# Patient Record
Sex: FEMALE | Race: WHITE | NOT HISPANIC OR LATINO | Employment: OTHER | URBAN - METROPOLITAN AREA
[De-identification: names, ages, dates, MRNs, and addresses within clinical notes are randomized per-mention and may not be internally consistent; named-entity substitution may affect disease eponyms.]

---

## 2018-11-20 ENCOUNTER — AMBULATORY - HEALTHEAST (OUTPATIENT)
Dept: VASCULAR SURGERY | Facility: CLINIC | Age: 73
End: 2018-11-20

## 2018-11-20 DIAGNOSIS — I87.8 VENOUS STASIS OF BOTH LOWER EXTREMITIES: ICD-10-CM

## 2018-12-12 ENCOUNTER — COMMUNICATION - HEALTHEAST (OUTPATIENT)
Dept: VASCULAR SURGERY | Facility: CLINIC | Age: 73
End: 2018-12-12

## 2019-01-22 ENCOUNTER — RECORDS - HEALTHEAST (OUTPATIENT)
Dept: VASCULAR ULTRASOUND | Facility: CLINIC | Age: 74
End: 2019-01-22

## 2019-01-22 ENCOUNTER — OFFICE VISIT - HEALTHEAST (OUTPATIENT)
Dept: VASCULAR SURGERY | Facility: CLINIC | Age: 74
End: 2019-01-22

## 2019-01-22 DIAGNOSIS — I87.2 VENOUS INSUFFICIENCY (CHRONIC) (PERIPHERAL): ICD-10-CM

## 2019-01-22 DIAGNOSIS — I83.893 SYMPTOMATIC VARICOSE VEINS OF BOTH LOWER EXTREMITIES: ICD-10-CM

## 2019-01-22 DIAGNOSIS — I87.2 VENOUS INSUFFICIENCY OF BOTH LOWER EXTREMITIES: ICD-10-CM

## 2019-01-22 DIAGNOSIS — I83.893 VARICOSE VEINS OF BILATERAL LOWER EXTREMITIES WITH OTHER COMPLICATIONS: ICD-10-CM

## 2019-01-22 ASSESSMENT — MIFFLIN-ST. JEOR: SCORE: 1273.79

## 2019-11-19 ENCOUNTER — HOSPITAL ENCOUNTER (OUTPATIENT)
Dept: MAMMOGRAPHY | Facility: CLINIC | Age: 74
Discharge: HOME | End: 2019-11-19

## 2019-11-19 DIAGNOSIS — Z12.31 VISIT FOR SCREENING MAMMOGRAM: ICD-10-CM

## 2020-09-17 ENCOUNTER — RECORDS - HEALTHEAST (OUTPATIENT)
Dept: ADMINISTRATIVE | Facility: OTHER | Age: 75
End: 2020-09-17

## 2020-10-19 ENCOUNTER — HOSPITAL ENCOUNTER (OUTPATIENT)
Dept: RADIOLOGY | Facility: HOSPITAL | Age: 75
Discharge: HOME | End: 2020-10-19
Attending: INTERNAL MEDICINE

## 2020-10-19 DIAGNOSIS — R13.10 DYSPHAGIA, UNSPECIFIED TYPE: ICD-10-CM

## 2021-05-28 ENCOUNTER — RECORDS - HEALTHEAST (OUTPATIENT)
Dept: ADMINISTRATIVE | Facility: CLINIC | Age: 76
End: 2021-05-28

## 2021-05-29 ENCOUNTER — RECORDS - HEALTHEAST (OUTPATIENT)
Dept: ADMINISTRATIVE | Facility: CLINIC | Age: 76
End: 2021-05-29

## 2021-05-30 ENCOUNTER — RECORDS - HEALTHEAST (OUTPATIENT)
Dept: ADMINISTRATIVE | Facility: CLINIC | Age: 76
End: 2021-05-30

## 2021-05-31 ENCOUNTER — RECORDS - HEALTHEAST (OUTPATIENT)
Dept: ADMINISTRATIVE | Facility: CLINIC | Age: 76
End: 2021-05-31

## 2021-06-02 ENCOUNTER — RECORDS - HEALTHEAST (OUTPATIENT)
Dept: ADMINISTRATIVE | Facility: CLINIC | Age: 76
End: 2021-06-02

## 2021-06-02 VITALS — WEIGHT: 175 LBS | HEIGHT: 64 IN | BODY MASS INDEX: 29.88 KG/M2

## 2021-06-12 NOTE — PROGRESS NOTES
"Speech Language/Pathology  Videofluoroscopic Swallow Study     Problem:  There is no problem list on file for this patient.      Onset Date: 9/17/2020 (order date)  Reason for Evaluation: Assess for oropharyngeal dysphagia  Pertinent History: Patient reports that food, particularly meats and vegetables, sometimes \"stick\" in her throat. She also has difficulty getting pills down. She tries to get capsules whenever possible, as these seem to go down easier. If taking tablets, she often has to split these in half and grind down the jagged edges before attempting to swallow them. Patient also endorses \"sporatic\" coughing with oral intake.   Current Diet: Regular textures and thin liquids     Assessment:     Patient demonstrated no significant oral dysphagia and no pharyngeal dysphagia. Patient's swallow function is WNL for her age group.     Patient is at minimal aspiration risk with all intake.     Recommendations:     Plan: Continue current diet of Regular textures and thin liquids     Strategies: Patient to sit fully upright for all intake and remain upright for 30 minutes thereafter, eat slowly, and take small bites & sips. Patient to also avoid environmental distractions (e.g., watching TV, using phone/tablet device) and refrain from talking while eating/drinking due to increased aspiration risk.     Speech Therapy is not recommended at this time.     Referrals: Patient to continue to follow with [facility] for ongoing symptom management.     Reviewed history of swallow problem with patient and verbally explained roles of SLP and radiologist. Verbally explained process of VFSS prior to administration of barium. Verbally explained results and recommendations to patient. SLP answered questions and stated that a copy of this report will be faxed to patient's referring provider, Dr. Roche of [facility]. Patient verbalized understanding.     Subjective:     Patient presented as pleasant and cooperative during this evaluation. " She arrived unaccompanied to this appointment. Patient also participated in an esophagram during this visit. Please refer to Radiology's separately dictated report for details.    An  was not applicable     Patient was given thin and pureed consistencies of barium.     Objective:     Dentition/Oral hygiene: Patient's dentition is intact. She has had extensive dental work. Oral hygiene was good.     Oral motor function was not impaired.      Bolus prep and oral control were not impaired.      Anterior-Posterior transit was not impaired.     Premature spill beyond the base of the tongue did not occur with either consistency.     Tongue base retraction was not impaired.     Oral stasis did not occur with either consistency.     Aspiration did not occur with either consistency.      Laryngeal penetration did not occur with either consistency.     Swallow response was timely with both consistencies.      Epiglottic movement was complete consistently across texture trials.     Pharyngeal stasis did not occur with either consistency.     Pharyngeal constriction was not impaired.     Hyolaryngeal elevation was not impaired. Hyolaryngeal excursion was not impaired.     Cricopharyngeal function was not impaired. Cervical esophageal function was not impaired.    17 dysphagia minutes    Doris KSENIA Ward, CCC-SLP

## 2021-06-17 NOTE — PATIENT INSTRUCTIONS - HE
"Patient Instructions by Deloris Mendez LPN at 1/22/2019 10:40 AM     Author: Deloris Mendez LPN Service: -- Author Type: Licensed Nurse    Filed: 1/22/2019 11:04 AM Encounter Date: 1/22/2019 Status: Addendum    : Deloris Mendez LPN (Licensed Nurse)    Related Notes: Original Note by Deloris Mendez LPN (Licensed Nurse) filed at 1/22/2019 10:47 AM       We would like you to purchase a pair of the [employer] brand stockings from [employer]. You can order them online or by calling the toll free number.  Your leg measurements are provided below and you will have to let the [employer] representative know these measurments to get the correct size for you .  Website: [email or web address]  Toll-free: [phone]  Hours: Mon-Thur 8:30am-8pm, Friday 8:30am-7pm, Sat 9am-4pm    Swelling in the legs can be caused by many reasons. No matter what the reason, treatment usually includes some type of compression. You should wear your compression socks as much as you can. Your compression should be put on first thing in the morning. Take the compression off at night. It is especially important to wear them with long periods of sitting/standing, long car rides or if you will be flying. Going without compression for even brief periods of time can be damaging to your legs and your health.  Compression socks should get replaced every 4-6 months. They do not need to be worn at night while in bed. Call us with any problems or questions. If you do a lot of standing, it is good to do calf raises to help keep the blood pumping. If you sit a lot at work, it is good to get up periodically to walk around. Elevation of the foot of your bed 4-6\" helps the blood return back to where it is needed.     Please call us if you have any questions [phone]    Thank you for choosing [email or web address].  We are prescribing some compression stockings for you. You should wear these socks as much as you can. It is especially important " "to wear them with long periods of sitting/standing, long car rides or if you will be flying. Compression socks should get replaced every 4-6 months. They do not need to be worn at night while in bed.    If you do a lot of standing it is good to do calf raises to help keep the blood pumping. If you sit a lot at work it is good to get up periodically to walk around. Elevation of the foot of your bed 4-6\" helps the blood return back to where it is needed.    Understanding Spider and Varicose Veins      Do you often hide your legs because of the way they look? You may have noticed tiny red or blue bursts (spider veins). Or maybe you have veins that bulge or look twisted (varicose veins). If so, there are treatments that can help.    What Are the Symptoms?  Spider veins or varicose veins may never be a problem. But sometimes they can cause legs to ache or swell. Your legs may also feel heavy and tired, or like theyre burning. These symptoms may be more severe at the end of the day. Prolonged sitting or standing can also make your symptoms worse.        Who Gets Spider and Varicose Veins?  Anyone can get spider or varicose veins. But vein problems tend to be hereditary (run in families). Other factors that can affect veins include:    Pregnancy, hormones, and birth control pills    A job where you stand or sit a lot    Extra weight or lack of exercise    Age    What Can Be Done?  Spider and varicose veins can affect the way you feel about yourself. Talk to your doctor about your concerns. There are treatments that can ease symptoms and make your legs look better.  Your Treatment Options  Treatment may include self-care, sclerotherapy (injecting veins with a chemical), or surgery. Spider veins and some varicose veins can be treated with sclerotherapy. Large varicose veins may need surgery.    2068-6514 [employer]. [address] [place] [address], [place] [address]. All rights reserved. This information is not " intended as a substitute for professional medical care. Always follow your healthcare professional's instructions.      Ultrasound    Thank you for choosing [facility] as partners in your care.  Please read the following information before your appointment.  Feel free to ask questions.    An ultrasound is an exam that uses sound waves to create pictures.  The special camera that takes the pictures is called a transducer.  An ultrasound technologist will perform the exam under the direction of a physician.    Preparation  Ultrasound preps vary.  If you are scheduled for an Aorta Ultrasound, please do not eat or drink anything 8 hours before your exam.  You may take daily medications with a small sip of water.  There is no preparation required for any of the other ultrasound exams performed at [facility].    The Examination  You may or may not need to change clothes for your exam.  The technologist will explain the exam to you.  He or she will ask you a few questions and answer any questions you may have.    You will lie on a table and a gel will be applied to your skin.  A small handheld instrument called a transducer will be moved across the area we are looking at while pictures are taken.  Also, your exam may include measuring your blood pressure on your arms, legs and/or toes.    When the Exam Is Completed  Your physician will receive the results of the exam and explain them to you.  You may return to your normal diet and activities unless otherwise directed by your doctor.  Feel free to ask questions if something is not clear to you.  We welcome comments.    At [facility], we are dedicated to providing the best possible care.  Thank you for taking time to read these instructions.  We hope your experience is as pleasant as possible.      You are scheduled for the following exam(s):    [x]Venous Duplex:  Evaluates the veins that carry blood to the heart from the arms and/or legs.   Gel is applied to the skin of the arms and/or legs.  A transducer will be placed on the gel covered areas to obtain images and evaluate flow in the veins.  This exam takes approximately 30 minutes per arm/leg.    Self-Care for Spider and Varicose Veins    Your doctor may suggest that you try self-care. Exercising and maintaining a healthy weight may keep problem veins from getting worse. Wearing elastic stockings and elevating your legs can help improve blood flow. Taking breaks when you sit or stand helps, too.  Exercising        Exercising is good for your veins because it improves blood flow. Walking, cycling, or swimming are great exercises for vein health. But be sure to check with your doctor before starting any exercise program. Also, keep these hints in mind:    When exercising, start out slowly and try to build up to 30 minutes on most days.    Elevate your legs above heart level after exercise to keep blood from pooling in veins.  Maintaining a healthy weight  Being overweight puts extra pressure on your veins. To maintain a healthy weight, try these tips:    Choose lean meats, fish, and skinless chicken.    Use low-fat dairy products.    Eat foods high in fiber, such as whole grains, fruits, and vegetables.    Cut down on sugar, salt, and fats such as butter.    Exercise regularly.  Wearing elastic stockings  Elastic stockings gently squeeze veins so blood flows upward. If you need elastic stockings, your doctor can prescribe them for you. Follow your doctors advice about how and when to wear them. Elastic stockings should fit snugly.  Elevating your legs  Raising your legs above heart level will help relieve swelling and keep blood from pooling in veins. Try to elevate your legs for 15 to 20 minutes at the end of the day, and whenever youre relaxing. To make sure your legs are raised above heart level, prop them up on cushions or large pillows.  When sitting and standing  To keep blood moving when you  have to sit or stand for long periods, try these tips:    At work, take walking breaks instead of coffee breaks. Walk during your lunch hour. Or try flexing your feet up and down 10 times each hour.    When standing, raise yourself up and down on your toes, or rock back and forth on your heels.    6724-8833 [employer]. [address] [place] [address], [place] [address]. All rights reserved. This information is not intended as a substitute for professional medical care. Always follow your healthcare professional's instructions.

## 2021-06-23 NOTE — PROGRESS NOTES
This is a new consult for Varicose Veins. Previously seen 9/2016, and had RFA of left gsv done in 2006. The patient has varicose veins that are problematic in right legs. Symptoms patient has been experiencing are pain  and  swelling in right leg, rated 5 out of 10. Patient  has been wearing compression stockings. Patient has not had recent imaging on legs done, last done 2016.

## 2021-06-27 ENCOUNTER — HEALTH MAINTENANCE LETTER (OUTPATIENT)
Age: 76
End: 2021-06-27

## 2021-06-27 NOTE — PROGRESS NOTES
Progress Notes by Moy Gottlieb MD at 1/22/2019 10:40 AM     Author: Moy Gottlieb MD Service: -- Author Type: Physician    Filed: 1/23/2019  4:00 PM Encounter Date: 1/22/2019 Status: Signed    : Moy Gottlieb MD (Physician)       [facility] Surgery Follow up    HPI:    73 y.o. year old female who returns for a follow up.  She has had a closure procedure back in 2006 and her last follow-up with us was in 2016.  She is on her feet quite a bit.  Tries to exercise on a regular basis and wears her socks often.     Allergies:Amoxicillin; Lisinopril; and Latex    Past Medical History:   Diagnosis Date   ? Fibrocystic breast    ? Hypothyroidism    ? Osteopenia    ? Venous stasis        Past Surgical History:   Procedure Laterality Date   ? CHOLECYSTECTOMY     ? EYE SURGERY     ? VARICOSE VEIN SURGERY Left 2006    rfa       CURRENT MEDS:  Current Outpatient Medications on File Prior to Visit   Medication Sig Dispense Refill   ? acetaZOLAMIDE (DIAMOX) 250 MG tablet      ? aspirin 81 MG EC tablet Take 81 mg by mouth daily.     ? FOLIC ACID/MULTIVIT-MIN/LUTEIN (CENTRUM SILVER ORAL) Take by mouth.     ? losartan (COZAAR) 100 MG tablet      ? naproxen sodium (ALEVE) 220 MG tablet Take 220 mg by mouth every 12 (twelve) hours as needed for pain.     ? omeprazole (PRILOSEC) 20 MG capsule      ? ranitidine (ZANTAC) 300 MG tablet      ? simvastatin (ZOCOR) 40 MG tablet      ? SYNTHROID 112 mcg tablet      ? VANIQA 13.9 % cream        No current facility-administered medications on file prior to visit.        Family History   Problem Relation Age of Onset   ? No Medical Problems Mother    ? No Medical Problems Father    ? No Medical Problems Sister    ? No Medical Problems Daughter    ? No Medical Problems Maternal Grandmother    ? No Medical Problems Maternal Grandfather    ? No Medical Problems Paternal Grandmother    ? No Medical Problems Paternal Grandfather    ? No Medical Problems Maternal Aunt    ? No  "Medical Problems Paternal Aunt    ? BRCA 1/2 Neg Hx    ? Breast cancer Neg Hx    ? Cancer Neg Hx    ? Colon cancer Neg Hx    ? Endometrial cancer Neg Hx    ? Ovarian cancer Neg Hx         reports that  has never smoked. she has never used smokeless tobacco. She reports that she drinks alcohol. She reports that she does not use drugs.    Review of Systems:  Negative except status post closure in the past she is occasionally of some pain symptoms and is no severe the branches of dilated veins at this time point.  These are bilaterally.  She is wearing compression off and on for the last few years. Otherwise twelve system of review is negative.      OBJECTIVE:  Vitals:    01/22/19 1058   BP: 104/76   Pulse: 60   Resp: 16   Temp: 97.7  F (36.5  C)   TempSrc: Oral   Weight: 175 lb (79.4 kg)   Height: 5' 4\" (1.626 m)     Body mass index is 30.04 kg/m .    EXAM:  GENERAL: This is a well-developed 73 y.o. female who appears her stated age  HEAD: normocephalic  HEENT: Pupils equal and reactive bilaterally  CARDIAC: RRR without murmur  CHEST/LUNG:  Clear to auscultation  ABDOMEN: Soft, nontender, nondistended, no masses    NEUROLOGIC: Focally intact, nonfocal  VASCULAR: Pulses intact, symmetrical upper and lower extremities.                LABS:  No results found for: WBC, HGB, HCT, MCV, PLT  INR/Prothrombin Time      No results found for: HGBA1C  No results found for: ALT, AST, GGT, ALKPHOS, BILITOT     Images:     US Venous Insufficiency Legs Bilateral (Order [id number])   Imaging   Date: 1/22/2019 Department: [facility] Ultrasound [place] Released By: Wilder Rey, RADHA, RVT Authorizing: Moy Gottlieb MD   Study Result     [facility] RADIOLOGY  LOCATION: [facility]  DATE: 1/22/2019     EXAM: BILATERAL LOWER EXTREMITY DEEP AND SUPERFICIAL VENOUS DUPLEX ULTRASOUND WITH PHYSIOLOGIC TESTING     INDICATION: Symptomatic varicose veins. Assess for incompetent veins.     TECHNIQUE: " Supine and upright ultrasound of the deep and superficial veins with Valsalva and compression augmentation maneuvers. Duplex imaging is performed utilizing gray-scale, two-dimensional images, color-flow imaging, Doppler waveform analysis, and   spectral Doppler imaging.      INCOMPETENCY CRITERIA: Deep vein reflux reported when greater than 1,000 ms flow reversal.  Superficial vein reflux reported when greater than 600 ms flow reversal.  vein reflux reported as greater than 350 ms flow reversal.     DEEP VEIN FINDINGS:     RIGHT LEG: The common femoral, profunda femoral, femoral, popliteal, and visualized calf veins are patent and compressible.  Common femoral vein is incompetent.     LEFT LEG:  The common femoral, profunda femoral, femoral, popliteal, and visualized calf veins are patent and compressible.   Common femoral veins competent.     RIGHT SUPERFICIAL VEIN FINDINGS:  GREAT SAPHENOUS VEIN: Competent from the saphenofemoral junction to the mid calf.     SMALL SAPHENOUS VEIN: Competent from the saphenopopliteal junction to the mid calf.     No incompetent perforating veins or abnormal accessory veins identified.     LEFT SUPERFICIAL VEIN FINDINGS:  GREAT SAPHENOUS VEIN: Incompetent in the proximal thigh where it measures 4 mm in diameter in the mid calf where it measures 4 mm in diameter. Occlusion is seen from the mid thigh to the knee.     SMALL SAPHENOUS VEIN: Competent from the saphenopopliteal junction to the mid calf.     The left anterior accessory saphenous vein appears incompetent measuring 4 mm at the saphenofemoral junction and 3 mm in the proximal thigh.     IMPRESSION:   CONCLUSION:   1.  No deep venous thrombosis of either lower extremity. Both the right left common femoral veins are incompetent.  2.  RIGHT LEG: The right superficial venous system is patent, without incompetence.  3.  LEFT LEG: The left greater saphenous vein demonstrates occlusion at the mid thigh to the knee but is  otherwise patent. Incompetence of the greater saphenous vein is noted in the proximal thigh and mid calf. The left anterior accessory saphenous vein   also appears incompetent from the saphenofemoral junction to the proximal thigh.         Assessment/Plan:   1. Venous insufficiency of both lower extremities  Status post closure in 2006.  I started last in 2016.  We repeated her ultrasound today which does not show any major issues she does have an ASV which is too tortuous to close in the left side.  After discussion when she can get her back in some stockings again have him wear those regular basis,  weight loss and weight control.  We discussed that there are surgical options at this time point she is just interested in continuing the compression conservative treatments.  - US Venous Insufficiency Legs Bilateral; Future  - Compression stockings    2. Symptomatic varicose veins of both lower extremities  Same  - US Venous Insufficiency Legs Bilateral; Future  - Compression stockings      No Follow-up on file.     Moy Gottlieb MD  [facility] Department of Surgery

## 2021-07-13 ENCOUNTER — RECORDS - HEALTHEAST (OUTPATIENT)
Dept: ADMINISTRATIVE | Facility: CLINIC | Age: 76
End: 2021-07-13

## 2021-07-21 ENCOUNTER — RECORDS - HEALTHEAST (OUTPATIENT)
Dept: ADMINISTRATIVE | Facility: CLINIC | Age: 76
End: 2021-07-21

## 2021-07-22 ENCOUNTER — RECORDS - HEALTHEAST (OUTPATIENT)
Dept: SCHEDULING | Facility: CLINIC | Age: 76
End: 2021-07-22

## 2021-07-22 DIAGNOSIS — Z12.31 OTHER SCREENING MAMMOGRAM: ICD-10-CM

## 2021-10-16 ENCOUNTER — HEALTH MAINTENANCE LETTER (OUTPATIENT)
Age: 76
End: 2021-10-16

## 2022-06-21 ENCOUNTER — TRANSFERRED RECORDS (OUTPATIENT)
Dept: HEALTH INFORMATION MANAGEMENT | Facility: CLINIC | Age: 77
End: 2022-06-21

## 2022-07-23 ENCOUNTER — HEALTH MAINTENANCE LETTER (OUTPATIENT)
Age: 77
End: 2022-07-23

## 2022-09-12 ENCOUNTER — TRANSFERRED RECORDS (OUTPATIENT)
Dept: HEALTH INFORMATION MANAGEMENT | Facility: CLINIC | Age: 77
End: 2022-09-12

## 2022-10-01 ENCOUNTER — HEALTH MAINTENANCE LETTER (OUTPATIENT)
Age: 77
End: 2022-10-01

## 2022-10-17 ENCOUNTER — TRANSFERRED RECORDS (OUTPATIENT)
Dept: HEALTH INFORMATION MANAGEMENT | Facility: CLINIC | Age: 77
End: 2022-10-17

## 2022-10-24 ENCOUNTER — TRANSFERRED RECORDS (OUTPATIENT)
Dept: HEALTH INFORMATION MANAGEMENT | Facility: CLINIC | Age: 77
End: 2022-10-24

## 2022-11-01 ENCOUNTER — TRANSFERRED RECORDS (OUTPATIENT)
Dept: HEALTH INFORMATION MANAGEMENT | Facility: CLINIC | Age: 77
End: 2022-11-01

## 2022-11-08 ENCOUNTER — TRANSFERRED RECORDS (OUTPATIENT)
Dept: HEALTH INFORMATION MANAGEMENT | Facility: CLINIC | Age: 77
End: 2022-11-08

## 2023-04-24 NOTE — PATIENT INSTRUCTIONS
Irene     Thank you for entrusting your care with us at the [facility].     We are prescribing some compression stockings for you. I have included different suppliers that should help you get measured and fitting to ensure proper fitting socks. You should wear these stockings as much as you can. It is especially important to wear them with long periods of standing, sitting, long car rides or if you will be flying. Compression socks should get refilled every 4-6 months. They do not need to be worn at night while in bed. It is recommended to wear compression level of 20-30mmhg or higher from toes to knees.     We will perform an ultrasound today or prior to your appointment with us in 3 months time to evaluate the valves in your veins.     We would like you to follow up in 3 months after wearing socks to see how you are doing.    Varicose Veins      Varicose veins are swollen, enlarged veins most often found in the legs. They are usually blue or purple in color and may bulge, twist, and stand out under the skin.  Normally, veins return blood from the body to the heart. The leg veins have one-way valves that prevent blood from flowing backward in the vein. When the valves are weak or damaged, blood backs up in the veins. This may cause some of the veins to swell and bulge and become varicose veins.      Symptoms    Varicose veins may or may not cause symptoms. If symptoms do occur, they can include:  Legs that feel tired, achy, heavy, or itchy  Leg muscle cramps  Skin changes, such as discoloration, dryness, redness, or rash (in more severe cases, you may also have sores on the skin called venous leg ulcers)    Pain & Discomfort  Pain, itching, swelling, burning, leg heaviness or tiredness, skin discoloration. Symptoms typically worsen throughout the day and are partially relieved by elevation or wearing compression socks or stockings.    Sometimes, varicose veins clot and become  painful, hot, hard and discolored. This is called phlebitis, an uncomfortable but temporary condition that will get better on its own in 2-3 months. Clots associated with phlebitis are limited to surface veins, and not dangerous - unlike clots in the deep veins (deep vein thrombosis or DVT) that are dangerous because they can travel to the heart or lung and require prompt treatment with blood thinners.    Bleeding  A shower or minor trauma can cause a varicose vein to burst and bleed.    Risk Factors    There are a number of factors that increase the risk for varicose veins. These can include:  Being a woman  Being older  Sitting or standing for long periods  Being overweight  Being pregnant  Having a family history of varicose veins    Among other things, veins are responsible for bringing blood back to the heart, sometimes working against gravity. When you walk, muscles in your leg squeeze the veins and help blood flow back into the heart. In normal veins, a series of valves assist this process. With varicose veins and with a related condition called chronic venous insufficiency, poorly functioning valves allow the blood to pool in the lower leg and cause symptoms.     Treatment begins with simple self-help measures (see below). If these don t help, there are many procedures that can be done to shrink or remove varicose veins. Your healthcare provider can tell you more about these options, if needed.    Home care  Support or compression stockings will likely be prescribed. If so, be sure to wear them as directed. They may help improve blood flow.  Exercising helps strengthen your leg muscles and improve blood flow. To get the most benefit, choose exercises such as walking, swimming, or cycling. Also try to exercise for at least 30 minutes on most days.  Raising (elevating) your legs lets gravity help blood flow back to the heart. Sit or lie with your feet above heart level a few times throughout the day, or as  directed.  Avoid long periods of sitting or standing. Change positions often. Also, move your ankles, toes and knees often. This may also help improve blood flow.  If you are overweight, talk with your healthcare provider about setting up a weight-loss plan. Maintaining a healthy weight can help reduce the strain on your veins. It may also improve symptoms, such as swelling and aching.  If you have dryness and itching, ask your provider about special lotions that can be applied to the skin to help improve symptoms.    When to seek medical advice  Call your healthcare provider right away if any of these occur:  Sudden, severe leg swelling, pain, or redness  Symptoms worsen, or they don t improve with self-care  Bleeding from any affected veins  Ulcers form on the legs, ankles, or feet  Fever of 100.4 F (38 C) or higher, or as advised by your provider      Understanding Spider and Varicose Veins    Do you often hide your legs because of the way they look? You may have noticed tiny red or blue bursts (spider veins). Or maybe you have veins that bulge or look twisted (varicose veins). If so, there are treatments that can help    What are the symptoms?  Spider veins or varicose veins may never be a problem. But sometimes they can cause legs to ache or swell. Your legs may also feel heavy and tired, or like they re burning. These symptoms may be more severe at the end of the day. Prolonged sitting or standing can also make your symptoms worse.    Who gets spider and varicose veins?  Anyone can get spider or varicose veins. But vein problems tend to be hereditary (run in families). Other factors that can affect veins include:  Pregnancy, hormones, and birth control pills  A job where you stand or sit a lot  Extra weight or lack of exercise  Age                       What can be done?  Spider and varicose veins can affect the way you feel about yourself. Talk to your healthcare provider about your concerns. There are  treatments that can ease symptoms and make your legs look better.    Your treatment choices  Treatment may include self-care, sclerotherapy (injecting veins with a chemical), surgery, or newer nonsurgical minimally invasive therapies. Spider veins and some varicose veins can be treated with sclerotherapy. Large varicose veins can often be treated with newer minimally invasive procedures and, in rare cases, surgery may be needed.     Please bring your prescription to a home medical supply store. Here is a list of locations but not limited to.     [facility]  [address] [place] [address]  Phone: [phone]  Fax: [phone] [facility] Bld[facility]  [address] [place] [address]  Phone: [phone]  Fax: [phone]   [facility]  [address] [place] [address]  Phone: [phone]  Fax: [phone] [facility]  [address] [place] [address]  Phone: [phone]  Fax: [phone]   [facility]  [address] [place] [address]  Phone: [phone] [facility]   [address] ([facility] [place] [address]  Phone: [phone]   [place]  [address] [place] [address]      Phone: [phone]  Fax: [phone] [place]  [address] [place] [address]      Phone: [phone]  Fax: [phone]     [facility] [email or web address]  [address], [place] [address]  [phone]    [facility]  [email or web address]  [address] [place] [address]  Phone: [phone]     [address] [place] [address]  Phone: [phone]    [address] [facility] [address] [place]  [address]  Phone: [phone] [address], [place] [address]  Phone: [phone] [address], [place] [address]  Phone: [phone]    [facility] [email or web address]  [address] [place] [address]  Phone: [phone] [address], [place] [address]  Phone: [phone] [address] [place] [address]  Phone: [phone]    [employer]  [email or web address]  [phone]      Reference: Tracey and SVS- Dr Radhames Carr: [email or web address]#resource-185      If you have any questions or problems prior to us seeing you at your next scheduled visit please do not hesitate to call us at [phone].

## 2023-04-24 NOTE — PROGRESS NOTES
[facility]        Patient is here for a consult to discuss varicose veins. Last seen 2019 for continued vein problems. Hx left GSV RFA 2006. The patient has varicose veins that are problematic in bilateral legs/weeping.on diuretic from PCP. 10/22 vein closure at vein clinic [facility] bilateral SSV and left SSV RFA.Refused to return to them. Symptoms patient has been experiencing are discoleration,weeping, tiredness, cramps and  swelling. Patient states their varicose veins are bothersome when standing, sitting,  working and household chores.Patient  has been wearing compression  Thigh high compression stockings. Patient  has been using pain medication or antiinflammatory's. Patient  has and has not had recent imaging on legs done. US done and reviewed options.no reflux . Offered sclerotherapy or stab if further problems in thigh only. Call if worsens. Continue thigh high compression.    Pt is currently taking no meds that would impact our treatment plan.    There were no vitals taken for this visit.          Vito Resendiz RN

## 2023-04-26 ENCOUNTER — OFFICE VISIT (OUTPATIENT)
Dept: VASCULAR SURGERY | Facility: CLINIC | Age: 78
End: 2023-04-26
Attending: SPECIALIST
Payer: COMMERCIAL

## 2023-04-26 ENCOUNTER — ANCILLARY PROCEDURE (OUTPATIENT)
Dept: VASCULAR ULTRASOUND | Facility: CLINIC | Age: 78
End: 2023-04-26
Attending: SPECIALIST
Payer: COMMERCIAL

## 2023-04-26 VITALS
DIASTOLIC BLOOD PRESSURE: 78 MMHG | SYSTOLIC BLOOD PRESSURE: 140 MMHG | RESPIRATION RATE: 12 BRPM | TEMPERATURE: 97.6 F | HEART RATE: 76 BPM

## 2023-04-26 DIAGNOSIS — I89.0 SECONDARY LYMPHEDEMA: ICD-10-CM

## 2023-04-26 DIAGNOSIS — I83.893 SYMPTOMATIC VARICOSE VEINS OF BOTH LOWER EXTREMITIES: ICD-10-CM

## 2023-04-26 DIAGNOSIS — I83.893 SYMPTOMATIC VARICOSE VEINS OF BOTH LOWER EXTREMITIES: Primary | ICD-10-CM

## 2023-04-26 PROCEDURE — G0463 HOSPITAL OUTPT CLINIC VISIT: HCPCS | Mod: 25 | Performed by: SPECIALIST

## 2023-04-26 PROCEDURE — 99203 OFFICE O/P NEW LOW 30 MIN: CPT | Performed by: SPECIALIST

## 2023-04-26 PROCEDURE — 93970 EXTREMITY STUDY: CPT

## 2023-04-26 ASSESSMENT — PAIN SCALES - GENERAL: PAINLEVEL: SEVERE PAIN (6)

## 2023-04-27 NOTE — PROGRESS NOTES
[facility]      Assessment:     1. varicose veins, bilateral   2. spider veins, bilateral   3. No major insuffiencies noted   4. Chronic venous stasis changes bilateral lower legs    Plan:     1. Treatment options of conservative therapy of stockings use, exercise, weight loss, elevating legs when possible.    2. Script for compression stockings 20-30 mm hg  3. Ultrasound to evaluate legs for incompetency of both deep and superficial system .   4. Surgical treatment, discussed options but at this time will defer to conservative   5. Follow up: prn as needed.   6. Call for any questions concerns or issues    Subjective:      Irene Pena is a 77 year old female  who was referred by Ronn Coleman  for evaluation of varicose veins. Symptoms include pain, aching, fatigue, burning, edema, dermatitis and chroinc changes.she had some work done at [employer]. Here for some follow uphad work here years ago. Patient has history of leg selling, pain and vein issues that have progressed. Pain and symptoms have affected daily living and work activities needing medications. Here for evaluation today. Stocking use with compression stockings of 20-30 mm hg or greater for greater then 3 months    Allergies:Amoxicillin, Lisinopril, and Latex    History reviewed. No pertinent past medical history.    Past Surgical History:   Procedure Laterality Date     CHOLECYSTECTOMY       EYE SURGERY       STRIP VEIN Left 2006    rfa         Current Outpatient Medications:      acetaZOLAMIDE (DIAMOX) 250 MG tablet, [ACETAZOLAMIDE (DIAMOX) 250 MG TABLET] , Disp: , Rfl:      aspirin 81 MG EC tablet, [ASPIRIN 81 MG EC TABLET] Take 81 mg by mouth daily., Disp: , Rfl:      FOLIC ACID/MULTIVIT-MIN/LUTEIN (CENTRUM SILVER ORAL), [FOLIC ACID/MULTIVIT-MIN/LUTEIN (CENTRUM SILVER ORAL)] Take by mouth., Disp: , Rfl:      losartan (COZAAR) 100 MG tablet, [LOSARTAN (COZAAR) 100 MG TABLET] , Disp: , Rfl:       naproxen sodium (ALEVE) 220 MG tablet, [NAPROXEN SODIUM (ALEVE) 220 MG TABLET] Take 220 mg by mouth every 12 (twelve) hours as needed for pain., Disp: , Rfl:      omeprazole (PRILOSEC) 20 MG capsule, [OMEPRAZOLE (PRILOSEC) 20 MG CAPSULE] , Disp: , Rfl:      ranitidine (ZANTAC) 300 MG tablet, [RANITIDINE (ZANTAC) 300 MG TABLET] , Disp: , Rfl:      simvastatin (ZOCOR) 40 MG tablet, [SIMVASTATIN (ZOCOR) 40 MG TABLET] , Disp: , Rfl:      SYNTHROID 112 mcg tablet, [SYNTHROID 112 MCG TABLET] , Disp: , Rfl:      VANIQA 13.9 % cream, [VANIQA 13.9 % CREAM] , Disp: , Rfl:      Family History   Problem Relation Age of Onset     No Known Problems Mother      No Known Problems Father      No Known Problems Sister      No Known Problems Daughter      No Known Problems Maternal Grandmother      No Known Problems Maternal Grandfather      No Known Problems Paternal Grandmother      No Known Problems Paternal Grandfather      No Known Problems Maternal Aunt      No Known Problems Paternal Aunt      Hereditary Breast and Ovarian Cancer Syndrome No family hx of      Breast Cancer No family hx of      Cancer No family hx of      Colon Cancer No family hx of      Endometrial Cancer No family hx of      Ovarian Cancer No family hx of         reports that she has never smoked. She has never used smokeless tobacco. She reports current alcohol use. She reports that she does not use drugs.      Review of Systems:    Pertinent items are noted in HPI.  Patient has symptomatic veins and changes of bilateral legs. These have progressed to the point of causing symptoms on a daily basis. This causes issues with daily activities and chores such as washing dishes, vacuuming, outdoor upkeep and standing for long lengths of time       Objective:     Vitals:    04/26/23 1011   BP: (!) 140/78   Pulse: 76   Resp: 12   Temp: 97.6  F (36.4  C)     There is no height or weight on file to calculate BMI.    EXAM:  GENERAL: This is a well-developed 77 year old  female who appears her stated age  HEAD: normocephalic  HEENT: Pupils equal and reactive bilaterally  MOUTH: mucus membranes intact. Normal dentation  CARDIAC: RRR without murmur  CHEST/LUNG:  Clear to auscultation bilaterally  ABDOMEN: Soft, nontender, nondistended, no masses noted   NEUROLOGIC: Focally intact, nonfocal, alert and oriented x 3  INTEGUMENT: No open lesions or ulcers  VASCULAR: Pulses intact, symmetrical upper and lower extremities. There areskin changes consistent with chronic venous insufficiency. Varicose veins present in bilateral greater saphenous distribution. Spider veins present bilateral.                                Side:: Bilateral  VCSS  PAIN:: Severe: Daily, severe limiting activities or requiring regular use of pain meds  Varicose Veins:: Mild: Few scattered  Venous Edema:: Severe: Extensive: Morning swelling above ankle and requiring activity change/elevation  Skin Pigmentation:: Severe: Wider distribution (above lower third) pulus recent pigmentation  Inflamation:: Absent: None  Induration:: Absent: None  Number of active ulcers:: 0  Active ulcer duration:: None  Active ulcer diameter:: None  Compression Therapy:: Full compliance stockings + elevation  VCSS Score:: 13  CEAP:: A healed venous ulcer    Imaging:    Exam Information    Exam Date Exam Time Accession # Performing Department Results    4/26/23 12:20 PM [id number] [facility]      PACS Images     Show images for US Venous Competency Bilateral     Study Result    Narrative & Impression   BILATERAL Venous Insufficiency Ultrasound (Date: 04/26/23)    BILATERAL Lower Extremity          Indication: Bilateral leg swelling/pain/Hx of bilateral ablations     Previous: 01/22/2019     Patient History: Swelling, Stasis, Vein Ablation, DVT and Obesity     Presenting Symptoms:  Swelling, Pain and Stasis     Technique:   Supine and Upright Ultrasound of the Deep and Superficial Veins  with Valsalva and Compression Augmentation Maneuvers. Duplex Imaging is performed utilizing gray-scale, Two-dimensional images, color-flow imaging, Doppler waveform analysis, and Spectral doppler imaging done with provacative maneuvers.      Incompetency Criteria:  Deep vein reflux reported when greater than 1000 msec flow reversal. Superficial vein reflux reported when equal to or greater than 600 msec flow reversal.  vein reflux reported as greater than 350 msec flow reversal.      Right  Leg Deep Veins    CFV SFJ PFV PROX FV   PROX FV MID FV DIST POP V. PERON.   V. PTV'S   Compressibility  (FC,PC,NC) FC FC FC FC FC FC FC FC FC   Reflux +   - - - - -   -         Right Leg Saphenous Veins  Location SFJ PROX THIGH KNEE MID CALF SPJ PROX CALF MID CALF   RT GSV (mm) 9 3/NC 3/NC 3         Reflux - NC NC +         RT SSV (mm)         2 3/NC 5   Reflux         - NC -      Location SFJ   RT AASV (mm) 4   Reflux -      Left Leg Deep Veins    CFV SFJ PFV PROX SFV PROX SFV MID SFV DIST POP V. PERON. V. PTV'S   Compressibility  (FC,PC,NC) FC FC FC FC FC FC FC FC FC   Reflux -   - - - - -   -         Lt Leg Saphenous Veins   Location SFJ PROX THIGH KNEE MID CALF SPJ PROX CALF MID CALF   LT GSV (mm) 7 NV NV 3         Reflux + NV NV +         LT SSV (mm)         2/NC 4/NC 4   Reflux         NC NC -      Comments: Left incompetent  vein visualized. Left calf proximal IPV measures 2.7 mm with 2.2 seconds of reflux. Left GSV is not patent to ablate. Left thigh proximal varicose vein branch coming from GSV at SFJ is incompetent and measures 5.8 mm with 3.8 seconds of reflux.       Impression:       Right Deep Vein Findings: Patent deep venous system with no evidence of DVT and reflux in the common femoral vein     Left Deep Vein Findings: Patent deep venous system with no evidence of DVT and no reflux     Superficial Vein Findings:      Right Greater Saphenous vein: Competent at the saphenofemoral junction.   Occluded at the proximal thigh/knee.  Reflux at the mid calf with diameter of 3 mm..     Right Small Saphenous Vein: Competent at the saphenous popliteal junction and mid calf.  Occluded at the proximal calf..     Left Greater Saphenous Vein: Reflux at the saphenofemoral junction and mid calf with maximal diameter of 7 mm.  Not visualized at the proximal thigh/knee. .     Left Small Saphenous Vein: Occluded at the saphenous popliteal junction/proximal calf.  Competent at the mid calf..     Perforating and Accessory Veins: Incompetent left proximal calf  vein measuring 2.7 mm and demonstrating 2.2 seconds of reflux..     Reference:     Compressibility: FC= Fully compressible, PC= Partially compressible, NC= Non-compressible, NV= Not Visualized     Reflux: (+) Incompetent  (-) Competent, (NV) = Not Visualized     Interpretation criteria:          Duration of Retrograde flow (milliseconds)  Category Deep Veins Superficial Veins  veins   Competent < 1000ms < 500ms < 350ms   Incompetent > 1000ms > 500ms > 350ms                Moy Gottlieb MD  General Surgery [phone]  Vascular Surgery [phone]

## 2023-05-26 ENCOUNTER — THERAPY VISIT (OUTPATIENT)
Dept: PHYSICAL THERAPY | Facility: REHABILITATION | Age: 78
End: 2023-05-26
Payer: COMMERCIAL

## 2023-05-26 DIAGNOSIS — I89.0 SECONDARY LYMPHEDEMA: ICD-10-CM

## 2023-05-26 PROCEDURE — 97140 MANUAL THERAPY 1/> REGIONS: CPT | Mod: GP | Performed by: PHYSICAL THERAPIST

## 2023-05-26 PROCEDURE — 97161 PT EVAL LOW COMPLEX 20 MIN: CPT | Mod: GP | Performed by: PHYSICAL THERAPIST

## 2023-05-26 NOTE — PROGRESS NOTES
PHYSICAL THERAPY EVALUATION  Type of Visit: Evaluation    See electronic medical record for Abuse and Falls Screening details.    Subjective       Pt is a 77 year-old woman with chief complaint B LE swelling in the setting of venous insufficiency/varicose veins. She has had RFA to B leg veins in the fall of 2022, then started to have more swelling. Started to have an open area on the right leg in an area she couldn t see, but had weeping on the compression sock. She has seen the vascular doctor more recently. She was told she has lymphedema. She is planning to go to [facility] as well. She isn't sure if she has an infection yet, but was told that she didn't have one by NP at recent visit. She has both knee high and thigh high compression socks - she got them a year ago (was previously fitted by [employer] many years ago). She is just reordering the same size/brand that she was fitted for years ago and thinks that they fit correctly (they are 20-30mmHg). She will wear the thigh-high (mid-thigh) compressions more recently. Her swelling is mostly below the knee. She hasn't seen wound care. Pt denies diabetes, no history of cancer, no other surgeries than for the veins in the legs. Right leg swelling is worse than the left leg.       Presenting condition or subjective complaint: Lymphedema - open sores on R leg  Date of onset: 03/01/23    Relevant medical history: Bladder or bowel problems; High blood pressure; Non-healing wounds; Overweight; Pain at night or rest; Sleep disorder like apnea; Thyroid problems; Vision problems   Dates & types of surgery: gall bladder removed 2005, lazy eye surgery 1995    Prior diagnostic imaging/testing results:     Venous study 4/26 - no DVTs, but superficial issues present (reflux, etc.)  Prior therapy history for the same diagnosis, illness or injury: No      Prior Level of Function   Transfers: Independent  Ambulation: Independent  ADL: Independent    Living Environment  Social  support: With a significant other or spouse   Type of home: House   Stairs to enter the home: Yes 3 Is there a railing: Yes   Ramp: No   Stairs inside the home: Yes 12 Is there a railing: Yes   Help at home: None  Equipment owned:       Employment: Not Applicable Retired  Hobbies/Interests: Reading, walking, swim exercise class    Patient goals for therapy: walking, swimming exercises, general exercises, hiking    Pain assessment: Pain present  Location: R leg at wounds/Ratin/10     Objective      EDEMA EVALUATION  Additional history:  Body part affected by edema: leg  If not cancer related, problems with veins or cause of swelling: yes  Distance able to walk: 1/2 mile  Time able to stand: about an hour  Sensation problems in hands/feet: Yes tingling in feet - poor balance  Edema etiology: Chronic Venous Insufficiency  LLIS: 10    Cognitive Status Examination  Orientation: Oriented to person, place and time   Level of Consciousness: Alert  Follows Commands and Answers Questions: 100% of the time  Personal Safety and Judgement: Intact  Memory: Intact    EDEMA  Skin Condition: Hemosiderin deposits, Pitting - 3+ distal B LEs  Scar: No  Capillary Refill: Symmetrical  Dorsal Pedal Pulse: Symmetrical  Stemmer Sign: +  Ulceration: Yes  Wounds: .5x.5cm, 1.5x1.5 cm, 4.5x1.5 cm on right leg posterior and lateral  GIRTH MEASUREMENTS: Refer to separate girth measurement flowsheet.   PALPATION: tender right lower leg  BED MOBILITY: Min Assist  TRANSFERS: Independent  GAIT/LOCOMOTION: indep, slow       Assessment & Plan   CLINICAL IMPRESSIONS   Medical Diagnosis: Lymphedema    Treatment Diagnosis: Secondary lymphedema   Impression/Assessment: Pt is a 77 year-old woman with chief complaint B LE swelling in the setting of venous insufficiency/varicose veins. She demonstrates stage 1-stage 2 B LE secondary lymphedema (R>L) with R leg wounds/weeping. She has been wearing mid-thigh compression garments for years, but has had  increased swelling and newer wounds after a vein procedure several months ago. Pt initially self-referred to PT, requesting order from primary physician. Pt has difficulty fitting into clothing d/t increased swelling and is at risk for infection with multiple R leg wounds. She is appropriate for decongestive therapy to reduce size of B LEs, assist with wound healing and improve functional mobility.     Clinical Decision Making (Complexity):   Clinical Presentation: Stable/Uncomplicated  Clinical Presentation Rationale: based on medical and personal factors listed in PT evaluation  Clinical Decision Making (Complexity): Low complexity    PLAN OF CARE  Treatment Interventions:  Interventions: Gait Training, Manual Therapy, Neuromuscular Re-education, Therapeutic Activity, Therapeutic Exercise, Self-Care/Home Management, Gradient Compression Bandaging, Standardized Testing    Long Term Goals     PT Goal 1  Goal Identifier: Volume  Goal Description: Pt will demonstrate a reduction of 10% in R LE volume and 5% in L LE volume to allow her to fit into clothing better.  Target Date: 08/23/23  PT Goal 2  Goal Identifier: Home management  Goal Description: Pt will be indep with home management for swelling including use of compression, exercises and massage to maximize clinical gains and assist with wound healing.  Target Date: 08/23/23      Frequency of Treatment: 3x/week  Duration of Treatment: 3-5 weeks, up to 90 days (pt cannot return for >1 month d/t clinic availability)         Risks and benefits of evaluation/treatment have been explained.   Patient/Family/caregiver agrees with Plan of Care.     Evaluation Time:     PT Eval, Low Complexity Minutes (64392): 26      Signing Clinician: Olga Saenz PT      [facility]                                                                                   OUTPATIENT PHYSICAL THERAPY      PLAN OF TREATMENT FOR OUTPATIENT REHABILITATION    Patient's Last Name, First Name, Irene Dsouza YOB: 1945   Provider's Name   [facility]   Medical Record No.  [id number]     Onset Date: 03/01/23  Start of Care Date: 05/26/23     Medical Diagnosis:  Lymphedema      PT Treatment Diagnosis:  Secondary lymphedema Plan of Treatment  Frequency/Duration: 3x/week/ 3-5 weeks, up to 90 days (pt cannot return for >1 month d/t clinic availability)    Certification date from 05/26/23 to 08/23/23         See note for plan of treatment details and functional goals     Olga Saenz, PT                         I CERTIFY THE NEED FOR THESE SERVICES FURNISHED UNDER        THIS PLAN OF TREATMENT AND WHILE UNDER MY CARE .             Physician Signature               Date    X_____________________________________________________                        Referring Provider:  Ronn Coleman      Initial Assessment  See Epic Evaluation- Start of Care Date: 05/26/23

## 2023-05-30 ENCOUNTER — MEDICAL CORRESPONDENCE (OUTPATIENT)
Dept: HEALTH INFORMATION MANAGEMENT | Facility: CLINIC | Age: 78
End: 2023-05-30
Payer: COMMERCIAL

## 2023-05-31 ENCOUNTER — THERAPY VISIT (OUTPATIENT)
Dept: PHYSICAL THERAPY | Facility: REHABILITATION | Age: 78
End: 2023-05-31
Payer: COMMERCIAL

## 2023-05-31 DIAGNOSIS — I89.0 SECONDARY LYMPHEDEMA: Primary | ICD-10-CM

## 2023-05-31 PROCEDURE — 97140 MANUAL THERAPY 1/> REGIONS: CPT | Mod: GP | Performed by: PHYSICAL THERAPIST

## 2023-06-07 ENCOUNTER — THERAPY VISIT (OUTPATIENT)
Dept: PHYSICAL THERAPY | Facility: REHABILITATION | Age: 78
End: 2023-06-07
Payer: COMMERCIAL

## 2023-06-07 DIAGNOSIS — I89.0 SECONDARY LYMPHEDEMA: Primary | ICD-10-CM

## 2023-06-07 PROCEDURE — 97140 MANUAL THERAPY 1/> REGIONS: CPT | Mod: GP | Performed by: PHYSICAL THERAPIST

## 2023-06-09 ENCOUNTER — THERAPY VISIT (OUTPATIENT)
Dept: PHYSICAL THERAPY | Facility: REHABILITATION | Age: 78
End: 2023-06-09
Payer: COMMERCIAL

## 2023-06-09 DIAGNOSIS — I89.0 SECONDARY LYMPHEDEMA: Primary | ICD-10-CM

## 2023-06-09 PROCEDURE — 97140 MANUAL THERAPY 1/> REGIONS: CPT | Mod: GP | Performed by: PHYSICAL THERAPIST

## 2023-06-12 ENCOUNTER — THERAPY VISIT (OUTPATIENT)
Dept: PHYSICAL THERAPY | Facility: REHABILITATION | Age: 78
End: 2023-06-12
Payer: COMMERCIAL

## 2023-06-12 DIAGNOSIS — I89.0 SECONDARY LYMPHEDEMA: Primary | ICD-10-CM

## 2023-06-12 PROCEDURE — 97140 MANUAL THERAPY 1/> REGIONS: CPT | Mod: CQ | Performed by: PHYSICAL THERAPY ASSISTANT

## 2023-06-14 ENCOUNTER — THERAPY VISIT (OUTPATIENT)
Dept: PHYSICAL THERAPY | Facility: REHABILITATION | Age: 78
End: 2023-06-14
Payer: COMMERCIAL

## 2023-06-14 DIAGNOSIS — I89.0 SECONDARY LYMPHEDEMA: Primary | ICD-10-CM

## 2023-06-14 PROCEDURE — 97140 MANUAL THERAPY 1/> REGIONS: CPT | Mod: GP | Performed by: PHYSICAL THERAPIST

## 2023-06-16 ENCOUNTER — THERAPY VISIT (OUTPATIENT)
Dept: PHYSICAL THERAPY | Facility: REHABILITATION | Age: 78
End: 2023-06-16
Payer: COMMERCIAL

## 2023-06-16 DIAGNOSIS — I89.0 SECONDARY LYMPHEDEMA: Primary | ICD-10-CM

## 2023-06-16 PROCEDURE — 97140 MANUAL THERAPY 1/> REGIONS: CPT | Mod: GP | Performed by: PHYSICAL THERAPIST

## 2023-06-19 ENCOUNTER — THERAPY VISIT (OUTPATIENT)
Dept: PHYSICAL THERAPY | Facility: REHABILITATION | Age: 78
End: 2023-06-19
Payer: COMMERCIAL

## 2023-06-19 DIAGNOSIS — I89.0 SECONDARY LYMPHEDEMA: Primary | ICD-10-CM

## 2023-06-19 PROCEDURE — 97140 MANUAL THERAPY 1/> REGIONS: CPT | Mod: GP | Performed by: PHYSICAL THERAPIST

## 2023-06-21 ENCOUNTER — THERAPY VISIT (OUTPATIENT)
Dept: PHYSICAL THERAPY | Facility: REHABILITATION | Age: 78
End: 2023-06-21
Payer: COMMERCIAL

## 2023-06-21 DIAGNOSIS — I89.0 SECONDARY LYMPHEDEMA: Primary | ICD-10-CM

## 2023-06-21 PROCEDURE — 97140 MANUAL THERAPY 1/> REGIONS: CPT | Mod: GP | Performed by: PHYSICAL THERAPIST

## 2023-06-23 ENCOUNTER — THERAPY VISIT (OUTPATIENT)
Dept: PHYSICAL THERAPY | Facility: REHABILITATION | Age: 78
End: 2023-06-23
Payer: COMMERCIAL

## 2023-06-23 DIAGNOSIS — I89.0 SECONDARY LYMPHEDEMA: Primary | ICD-10-CM

## 2023-06-23 PROCEDURE — 97140 MANUAL THERAPY 1/> REGIONS: CPT | Mod: GP | Performed by: PHYSICAL THERAPIST

## 2023-06-29 ENCOUNTER — THERAPY VISIT (OUTPATIENT)
Dept: PHYSICAL THERAPY | Facility: REHABILITATION | Age: 78
End: 2023-06-29
Payer: COMMERCIAL

## 2023-06-29 DIAGNOSIS — I89.0 SECONDARY LYMPHEDEMA: Primary | ICD-10-CM

## 2023-06-29 PROCEDURE — 97140 MANUAL THERAPY 1/> REGIONS: CPT | Mod: GP | Performed by: PHYSICAL THERAPIST

## 2023-07-03 ENCOUNTER — THERAPY VISIT (OUTPATIENT)
Dept: PHYSICAL THERAPY | Facility: REHABILITATION | Age: 78
End: 2023-07-03
Payer: COMMERCIAL

## 2023-07-03 DIAGNOSIS — I89.0 SECONDARY LYMPHEDEMA: Primary | ICD-10-CM

## 2023-07-03 PROCEDURE — 97140 MANUAL THERAPY 1/> REGIONS: CPT | Mod: GP | Performed by: PHYSICAL THERAPIST

## 2023-07-06 ENCOUNTER — THERAPY VISIT (OUTPATIENT)
Dept: PHYSICAL THERAPY | Facility: REHABILITATION | Age: 78
End: 2023-07-06
Payer: COMMERCIAL

## 2023-07-06 DIAGNOSIS — I89.0 SECONDARY LYMPHEDEMA: Primary | ICD-10-CM

## 2023-07-06 PROCEDURE — 97140 MANUAL THERAPY 1/> REGIONS: CPT | Mod: GP | Performed by: PHYSICAL THERAPIST

## 2023-07-10 ENCOUNTER — THERAPY VISIT (OUTPATIENT)
Dept: PHYSICAL THERAPY | Facility: REHABILITATION | Age: 78
End: 2023-07-10
Payer: COMMERCIAL

## 2023-07-10 DIAGNOSIS — I89.0 SECONDARY LYMPHEDEMA: Primary | ICD-10-CM

## 2023-07-10 PROCEDURE — 97140 MANUAL THERAPY 1/> REGIONS: CPT | Mod: GP | Performed by: PHYSICAL THERAPIST

## 2023-07-11 ENCOUNTER — ANCILLARY PROCEDURE (OUTPATIENT)
Dept: MAMMOGRAPHY | Facility: CLINIC | Age: 78
End: 2023-07-11
Attending: INTERNAL MEDICINE
Payer: COMMERCIAL

## 2023-07-11 DIAGNOSIS — Z12.31 SCREENING MAMMOGRAM, ENCOUNTER FOR: ICD-10-CM

## 2023-07-11 PROCEDURE — 77067 SCR MAMMO BI INCL CAD: CPT

## 2023-07-14 ENCOUNTER — THERAPY VISIT (OUTPATIENT)
Dept: PHYSICAL THERAPY | Facility: REHABILITATION | Age: 78
End: 2023-07-14
Payer: COMMERCIAL

## 2023-07-14 DIAGNOSIS — I89.0 SECONDARY LYMPHEDEMA: Primary | ICD-10-CM

## 2023-07-14 PROCEDURE — 97140 MANUAL THERAPY 1/> REGIONS: CPT | Mod: GP | Performed by: PHYSICAL THERAPIST

## 2023-07-14 NOTE — PROGRESS NOTES
Psychiatric                                                                                   OUTPATIENT PHYSICAL THERAPY    PLAN OF TREATMENT FOR OUTPATIENT REHABILITATION   Patient's Last Name, First Name, Irene Dsouza YOB: 1945   Provider's Name   [facility]   Medical Record No.  [id number]     Onset Date: 03/01/23  Start of Care Date: 05/26/23     Medical Diagnosis:  Lymphedema      PT Treatment Diagnosis:  Secondary lymphedema Plan of Treatment  Frequency/Duration: 2-3x/week/ 24 visits total, up to 90 days    Certification date from 07/14/23 to 10/11/23         See note for plan of treatment details and functional goals     Olga Saenz, PT                         I CERTIFY THE NEED FOR THESE SERVICES FURNISHED UNDER        THIS PLAN OF TREATMENT AND WHILE UNDER MY CARE .             Physician Signature               Date    X_____________________________________________________                    Referring Provider:  Ronn Coleman      Initial Assessment  See Epic Evaluation- Start of Care Date: 05/26/23 07/14/23 0500   Appointment Info   Signing clinician's name / credentials Olga Saenz, PT, CLT   Total/Authorized Visits 24   Visits Used 15   Medical Diagnosis Lymphedema   PT Tx Diagnosis Secondary lymphedema   Other pertinent information current pair: mediven plus, petite, 20-30 mmHg, extrawide calf w/topband, III   Progress Note/Certification   Start of Care Date 05/26/23   Onset of illness/injury or Date of Surgery 03/01/23   Therapy Frequency 2-3x/week   Predicted Duration 24 visits total, up to 90 days   Certification date from 07/14/23   Certification date to 10/11/23   Progress Note Due Date   (visit 24)   Supervision   PT Assistant Visit Number 1   GOALS   PT Goals 2   PT Goal 1   Goal Identifier Volume   Goal Description Pt will demonstrate a reduction of 10% in R LE volume and 5% in L LE  volume to allow her to fit into clothing better.   Rationale to maximize safety and independence with performance of ADLs and functional tasks   Goal Progress Goal progressing, reduction of 11% in R leg and nearly 5% in L Leg   Target Date 10/11/23   PT Goal 2   Goal Identifier Home management   Goal Description Pt will be indep with home management for swelling including use of compression, exercises and massage to maximize clinical gains and assist with wound healing.   Rationale to maximize safety and independence with performance of ADLs and functional tasks   Goal Progress Goal progressing, pt working on this with help from neighbor   Target Date 10/11/23   Subjective Report   Subjective Report Pt reports that she removed wraps 2 days later and had a small amount of bloody and yellow drainage. This is all related to the new area on her R lateral leg. The posterior leg is OK on the R side. She worse her compression sock on the left leg for the past 2 days and she thinks that the swelling is worse now.   Objective Measures   Objective Measures Objective Measure 1   Objective Measure 1   Objective Measure Edema   Details See separate flowsheet for measurements. Wound on lateral R leg is 1.5cm x 1.0 cm - draining serosanguinous and yellow drainage (minimal). Skin on lower legs is red with hemosiderin staining   Treatment Interventions (PT)   Interventions Manual Therapy   Manual Therapy   Manual Therapy: Mobilization, MFR, MLD, friction massage minutes (00122) 59   Manual Therapy 1 MLD, compression bandaging   Manual Therapy 1 - Details Removed pt's bandaging from R LE and L compression sock at start of session. Measurement of B Les and pt educated on progress. Discussed updating plan of care to accommodate non-healing wounds on the R leg and poor skin condition. Acetic acid wash to R LE  (1 part white distilled vinegar, 4 parts water), MLD to B LEs starting with popliteal node clearing. Compression bandaging as  follows: Neosporin on open wound R lateral leg, Aquaphor at periwound area and Eucerin lotion on the rest of the leg (and entire L LE), covered with non-stick pad and ABD pad held in place with kerlix roll, padding roll from dorsum of foot to below knee, 1 8 cm, 1 10 cm and 1 12 cm short-stretch wraps from toes to below knees. Covered distal end in comperm.   Skilled Intervention MLD, compression bandaging to promote lymphatic flow and allow for wound healing   Patient Response/Progress R lateral leg wound is worse today than 5 days ago   Plan   Home program Neighbor to assist with wrapping B LEs every 2-3 days with wound cares   Plan for next session Wound still present - R lateral leg. Continue vinegar wash, use of Aquaphor, wound cares, wrapping to B LEs   Comments   Comments Pt is a 77 year-old woman with chief complaint B LE swelling in the setting of venous insufficiency/varicose veins. She has been seen for 15 visits from 5/26/23 to present with treatment focused on decongestive therapy. In the past few weeks the initial wounds had improved greatly and she was nearing discharge today; however, this week a NEW wound area opened on the R lateral leg and this has worsened even with consistent compression bandaging. Her overall volume has decreased nicely in the R leg; despite this, the wounds persist and pt continues to demonstrate very fragile skin. Updating POC and certification period d/t continued wounds. Requesting possible wound care referral or other assistance related to fragile skin.   Total Session Time   Timed Code Treatment Minutes 59   Total Treatment Time (sum of timed and untimed services) 59       Olga Saenz, PT, DPT, CLT  7/14/2023

## 2023-07-19 ENCOUNTER — THERAPY VISIT (OUTPATIENT)
Dept: PHYSICAL THERAPY | Facility: REHABILITATION | Age: 78
End: 2023-07-19
Payer: COMMERCIAL

## 2023-07-19 DIAGNOSIS — I89.0 SECONDARY LYMPHEDEMA: Primary | ICD-10-CM

## 2023-07-19 DIAGNOSIS — M62.81 GENERALIZED MUSCLE WEAKNESS: ICD-10-CM

## 2023-07-19 PROCEDURE — 97140 MANUAL THERAPY 1/> REGIONS: CPT | Mod: CQ | Performed by: PHYSICAL THERAPY ASSISTANT

## 2023-07-21 ENCOUNTER — THERAPY VISIT (OUTPATIENT)
Dept: PHYSICAL THERAPY | Facility: REHABILITATION | Age: 78
End: 2023-07-21
Payer: COMMERCIAL

## 2023-07-21 DIAGNOSIS — I89.0 SECONDARY LYMPHEDEMA: Primary | ICD-10-CM

## 2023-07-21 PROCEDURE — 97140 MANUAL THERAPY 1/> REGIONS: CPT | Mod: GP | Performed by: PHYSICAL THERAPIST

## 2023-07-24 ENCOUNTER — THERAPY VISIT (OUTPATIENT)
Dept: PHYSICAL THERAPY | Facility: REHABILITATION | Age: 78
End: 2023-07-24
Payer: COMMERCIAL

## 2023-07-24 DIAGNOSIS — I89.0 SECONDARY LYMPHEDEMA: Primary | ICD-10-CM

## 2023-07-24 PROCEDURE — 97140 MANUAL THERAPY 1/> REGIONS: CPT | Mod: GP | Performed by: PHYSICAL THERAPIST

## 2023-07-26 ENCOUNTER — THERAPY VISIT (OUTPATIENT)
Dept: PHYSICAL THERAPY | Facility: REHABILITATION | Age: 78
End: 2023-07-26
Payer: COMMERCIAL

## 2023-07-26 DIAGNOSIS — M62.81 GENERALIZED MUSCLE WEAKNESS: ICD-10-CM

## 2023-07-26 DIAGNOSIS — I89.0 SECONDARY LYMPHEDEMA: Primary | ICD-10-CM

## 2023-07-26 PROCEDURE — 97140 MANUAL THERAPY 1/> REGIONS: CPT | Mod: GP | Performed by: PHYSICAL THERAPY ASSISTANT

## 2023-07-28 ENCOUNTER — THERAPY VISIT (OUTPATIENT)
Dept: PHYSICAL THERAPY | Facility: REHABILITATION | Age: 78
End: 2023-07-28
Payer: COMMERCIAL

## 2023-07-28 DIAGNOSIS — I89.0 SECONDARY LYMPHEDEMA: Primary | ICD-10-CM

## 2023-07-28 PROCEDURE — 97140 MANUAL THERAPY 1/> REGIONS: CPT | Mod: GP | Performed by: PHYSICAL THERAPIST

## 2023-07-31 ENCOUNTER — THERAPY VISIT (OUTPATIENT)
Dept: PHYSICAL THERAPY | Facility: REHABILITATION | Age: 78
End: 2023-07-31
Payer: COMMERCIAL

## 2023-07-31 DIAGNOSIS — I89.0 SECONDARY LYMPHEDEMA: Primary | ICD-10-CM

## 2023-07-31 DIAGNOSIS — M62.81 GENERALIZED MUSCLE WEAKNESS: ICD-10-CM

## 2023-07-31 PROCEDURE — 97140 MANUAL THERAPY 1/> REGIONS: CPT | Mod: GP | Performed by: PHYSICAL THERAPIST

## 2023-08-02 ENCOUNTER — THERAPY VISIT (OUTPATIENT)
Dept: PHYSICAL THERAPY | Facility: REHABILITATION | Age: 78
End: 2023-08-02
Payer: COMMERCIAL

## 2023-08-02 DIAGNOSIS — M62.81 GENERALIZED MUSCLE WEAKNESS: ICD-10-CM

## 2023-08-02 DIAGNOSIS — I89.0 SECONDARY LYMPHEDEMA: Primary | ICD-10-CM

## 2023-08-02 PROCEDURE — 97140 MANUAL THERAPY 1/> REGIONS: CPT | Mod: GP | Performed by: PHYSICAL THERAPIST

## 2023-08-04 ENCOUNTER — THERAPY VISIT (OUTPATIENT)
Dept: PHYSICAL THERAPY | Facility: REHABILITATION | Age: 78
End: 2023-08-04
Payer: COMMERCIAL

## 2023-08-04 DIAGNOSIS — M62.81 GENERALIZED MUSCLE WEAKNESS: ICD-10-CM

## 2023-08-04 DIAGNOSIS — I89.0 SECONDARY LYMPHEDEMA: Primary | ICD-10-CM

## 2023-08-04 PROCEDURE — 97140 MANUAL THERAPY 1/> REGIONS: CPT | Mod: GP | Performed by: PHYSICAL THERAPIST

## 2023-08-06 ENCOUNTER — HEALTH MAINTENANCE LETTER (OUTPATIENT)
Age: 78
End: 2023-08-06

## 2023-08-08 ENCOUNTER — THERAPY VISIT (OUTPATIENT)
Dept: PHYSICAL THERAPY | Facility: REHABILITATION | Age: 78
End: 2023-08-08
Payer: COMMERCIAL

## 2023-08-08 DIAGNOSIS — M62.81 GENERALIZED MUSCLE WEAKNESS: ICD-10-CM

## 2023-08-08 DIAGNOSIS — I89.0 SECONDARY LYMPHEDEMA: Primary | ICD-10-CM

## 2023-08-08 PROCEDURE — 97140 MANUAL THERAPY 1/> REGIONS: CPT | Mod: GP | Performed by: PHYSICAL THERAPY ASSISTANT

## 2023-08-14 ENCOUNTER — THERAPY VISIT (OUTPATIENT)
Dept: PHYSICAL THERAPY | Facility: REHABILITATION | Age: 78
End: 2023-08-14
Payer: COMMERCIAL

## 2023-08-14 DIAGNOSIS — I89.0 SECONDARY LYMPHEDEMA: Primary | ICD-10-CM

## 2023-08-14 PROCEDURE — 97140 MANUAL THERAPY 1/> REGIONS: CPT | Mod: GP | Performed by: PHYSICAL THERAPIST

## 2023-08-14 NOTE — PROGRESS NOTES
[facility]                                                                                   OUTPATIENT PHYSICAL THERAPY    PLAN OF TREATMENT FOR OUTPATIENT REHABILITATION   Patient's Last Name, First Name, Irene Dsouza YOB: 1945   Provider's Name   [facility]   Medical Record No.  [id number]     Onset Date: 03/01/23  Start of Care Date: 05/26/23     Medical Diagnosis:  Lymphedema      PT Treatment Diagnosis:  Secondary lymphedema Plan of Treatment  Frequency/Duration: 2-3x/week/ (P) 34 visits total, up to 90 days    Certification date from (P) 08/08/23 to (P) 11/06/23         See note for plan of treatment details and functional goals     EDMAR Gillis, PT, DPT, CLT           I CERTIFY THE NEED FOR THESE SERVICES FURNISHED UNDER        THIS PLAN OF TREATMENT AND WHILE UNDER MY CARE .             Physician Signature               Date    X_____________________________________________________                    Referring Provider:  Ronn Coleman      Initial Assessment  See Epic Evaluation- Start of Care Date: 05/26/23 08/08/23 0500   Appointment Info   Signing clinician's name / credentials Kayley Eason PTA,CLT   Total/Authorized Visits 34   Visits Used 24   Medical Diagnosis Lymphedema   PT Tx Diagnosis Secondary lymphedema   Other pertinent information current pair: mediven plus, petite, 20-30 mmHg, extrawide calf w/topband, III   Progress Note/Certification   Start of Care Date 05/26/23   Onset of illness/injury or Date of Surgery 03/01/23   Therapy Frequency 2-3x/week   Predicted Duration 34 visits total, up to 90 days   Certification date from 08/08/23   Certification date to 11/06/23   Progress Note Due Date   (visit 34)   Supervision   PT Assistant Visit Number 3   GOALS   PT Goals 2   PT Goal 1   Goal Identifier Volume   Goal Description Pt will demonstrate a reduction  of 10% in R LE volume and 5% in L LE volume to allow her to fit into clothing better.   Rationale to maximize safety and independence with performance of ADLs and functional tasks   Goal Progress Goal progressing, reduction of 11% in R leg and nearly 5% in L Leg   Target Date 10/11/23   PT Goal 2   Goal Identifier Home management   Goal Description Pt will be indep with home management for swelling including use of compression, exercises and massage to maximize clinical gains and assist with wound healing.   Rationale to maximize safety and independence with performance of ADLs and functional tasks   Goal Progress Goal progressing, pt working on this with help from neighbor   Target Date 10/11/23   Subjective Report   Subjective Report Pt should get her velcro garments next week. She is to follow up with her PCP on 8/15. She will request a referal to the vascular center for wound care.   Objective Measures   Objective Measures Objective Measure 1   Objective Measure 1   Objective Measure Edema   Details PREVIOUS - wound area anterior to lateral scab - approx 5 cm x 2 cm, minimal yellow drainage on ABD pad,mostly healed; NEW area, 2 small .5cm x.5cm areas one seems slightly deeper with some bleeding when removing bandages.   Treatment Interventions (PT)   Interventions Manual Therapy   Manual Therapy   Manual Therapy: Mobilization, MFR, MLD, friction massage minutes (08148) 55   Manual Therapy 1 MLD, compression bandaging   Manual Therapy 1 - Details Removed pt's bandaging from B LE at start of session. MLD to B LEs starting with inguinal node clearing. Washed both legs with soap/water and acetic acid wash on R leg. Brief MLD to B lower legs. Compression bandaging as follows: Eucerin lotion on both legs, Aquaphor ointment at periwound area on R leg, pt s Neosporin on wound bed covered with non-stick pad, ABD pad held in place with gauze roll; B LEs: stockinette, padding roll from dorsum of foot to below knee, 1 (8  cm), 1( 10 cm) and 1 ( 12 cm) short-stretch wraps from toes to below knees.used a small piece comprem size G on top to protect bandages and tape.   Skilled Intervention MLD, compression bandaging to promote lymphatic flow and allow for wound healing   Patient Response/Progress 2 new areas open skin noted with some bleeding, minimal drainage noted   Plan   Home program Neighbor to assist with wrapping B LEs every 2-3 days with wound cares   Plan for next session Patient will continued therapy until she gets her velcro compressions and then they will be traveling to [place] and may cancel some appointments   Comments   Comments Pt has been seen for 24 visits from 5/26/23 to present with treatment focused on decongestive therapy to decrease size of legs and improve wound healing. The size of the legs has reduced well with consistent MLD and compression bandaging, but the wounds on the R leg have been challenging. The initial wounds on the posterior calf healed and skin smoothed over, but a newer wound appeared laterally to this area. This wound also healed and closed, but a third newer wound appeared slightly laterally to this area as well and this has occurred even a 4th time during the course of therapy. There were previous points in time when therapy was likely to be completed d/t improved swelling and wounds and this was not possible d/t new wound areas. Therapists reached out to pt s PCP and requested wound care referral and assistance with fragile skin, but this did not materialize. Asked pt to follow up with obtaining Velcro compression wraps and this did occur and pt will be receiving them on 8/15 (she also ordered new compression socks, 20-30 mmHg). She was working with her neighbor on continued compression bandaging during course of therapy and this has gone well, but is not a long-term sustainable solution. The compression bandaging has helped with leg volumes a lot, but the wounds have remained stubborn. In  addition, her older compression garments do not seem to be maintaining gains well (when she has transitioned to her sock on the left side, the volumes tend to increase). Pt is leaving town soon (likely as of 8/21/23) and will be asking her doctor for wound care referral as well. Updating plan of care and certification period to allow for continued therapy as needed when pt returns from her trip.   Total Session Time   Timed Code Treatment Minutes 55   Total Treatment Time (sum of timed and untimed services) 55     Addended 8/14/22 with progress note/recertification  Olga Saenz, PT, DPT, CLT  8/14/2023

## 2023-08-15 ENCOUNTER — TELEPHONE (OUTPATIENT)
Facility: CLINIC | Age: 78
End: 2023-08-15
Payer: COMMERCIAL

## 2023-08-15 NOTE — TELEPHONE ENCOUNTER
Consult received via Fax from Dr. Ronn Coleman for wound of the lower legs. See 8/8 lymphedema therapy appointment.    Please schedule with Dr. Conti, Dr. Reece, Joan Goldstein PA-C, or Cherry Zabala NP at [facility] for next available appointment.    **For all providers, Joan Rider PA-C, Dr. Lara, Cherry Zabala NP or Dr. Reece, please schedule a follow up 2-3 weeks after initial appointment.**  --If unable to schedule within 2-3 weeks then please place on cancellation list--    Is the patient able to make their own medical decisions? yes    Is patient a KORY lift? PLEASE INQUIRE WHEN MAKING THE APPOINTMENT AND PUT IN APPOINTMENT NOTES    Routing to  Wound Healing Scheduling.

## 2023-08-16 ENCOUNTER — THERAPY VISIT (OUTPATIENT)
Dept: PHYSICAL THERAPY | Facility: REHABILITATION | Age: 78
End: 2023-08-16
Payer: COMMERCIAL

## 2023-08-16 ENCOUNTER — TELEPHONE (OUTPATIENT)
Dept: VASCULAR SURGERY | Facility: CLINIC | Age: 78
End: 2023-08-16

## 2023-08-16 DIAGNOSIS — I89.0 SECONDARY LYMPHEDEMA: Primary | ICD-10-CM

## 2023-08-16 PROCEDURE — 97140 MANUAL THERAPY 1/> REGIONS: CPT | Mod: GP | Performed by: PHYSICAL THERAPIST

## 2023-08-16 NOTE — TELEPHONE ENCOUNTER
Pt called asking for wound consult. She has a wound on her right leg, she has been seeing the lymphedema clinic recently for swelling. Will need to review notes, told patient we will call back tomorrow. She wants to be seen in [place].

## 2023-08-18 ENCOUNTER — THERAPY VISIT (OUTPATIENT)
Dept: PHYSICAL THERAPY | Facility: REHABILITATION | Age: 78
End: 2023-08-18
Payer: COMMERCIAL

## 2023-08-18 DIAGNOSIS — I89.0 SECONDARY LYMPHEDEMA: Primary | ICD-10-CM

## 2023-08-18 PROCEDURE — 97140 MANUAL THERAPY 1/> REGIONS: CPT | Mod: GP | Performed by: PHYSICAL THERAPIST

## 2023-08-21 NOTE — TELEPHONE ENCOUNTER
Called patient a second and final time. Left a message to call the clinic for scheduling if interested.

## 2023-08-25 ENCOUNTER — THERAPY VISIT (OUTPATIENT)
Dept: PHYSICAL THERAPY | Facility: REHABILITATION | Age: 78
End: 2023-08-25
Payer: COMMERCIAL

## 2023-08-25 DIAGNOSIS — I89.0 SECONDARY LYMPHEDEMA: Primary | ICD-10-CM

## 2023-08-25 PROCEDURE — 97140 MANUAL THERAPY 1/> REGIONS: CPT | Mod: GP | Performed by: PHYSICAL THERAPIST

## 2023-08-29 ENCOUNTER — THERAPY VISIT (OUTPATIENT)
Dept: PHYSICAL THERAPY | Facility: REHABILITATION | Age: 78
End: 2023-08-29
Payer: COMMERCIAL

## 2023-08-29 DIAGNOSIS — I89.0 SECONDARY LYMPHEDEMA: Primary | ICD-10-CM

## 2023-08-29 PROCEDURE — 97140 MANUAL THERAPY 1/> REGIONS: CPT | Mod: GP | Performed by: PHYSICAL THERAPIST

## 2023-08-31 ENCOUNTER — THERAPY VISIT (OUTPATIENT)
Dept: PHYSICAL THERAPY | Facility: REHABILITATION | Age: 78
End: 2023-08-31
Payer: COMMERCIAL

## 2023-08-31 DIAGNOSIS — I89.0 SECONDARY LYMPHEDEMA: Primary | ICD-10-CM

## 2023-08-31 PROCEDURE — 97140 MANUAL THERAPY 1/> REGIONS: CPT | Mod: GP | Performed by: PHYSICAL THERAPIST

## 2023-09-11 ENCOUNTER — THERAPY VISIT (OUTPATIENT)
Dept: PHYSICAL THERAPY | Facility: REHABILITATION | Age: 78
End: 2023-09-11
Payer: COMMERCIAL

## 2023-09-11 DIAGNOSIS — I89.0 SECONDARY LYMPHEDEMA: Primary | ICD-10-CM

## 2023-09-11 PROCEDURE — 97140 MANUAL THERAPY 1/> REGIONS: CPT | Mod: GP | Performed by: PHYSICAL THERAPIST

## 2023-09-14 ENCOUNTER — OFFICE VISIT (OUTPATIENT)
Dept: VASCULAR SURGERY | Facility: CLINIC | Age: 78
End: 2023-09-14
Attending: FAMILY MEDICINE
Payer: COMMERCIAL

## 2023-09-14 VITALS — SYSTOLIC BLOOD PRESSURE: 152 MMHG | DIASTOLIC BLOOD PRESSURE: 79 MMHG | HEART RATE: 64 BPM | OXYGEN SATURATION: 93 %

## 2023-09-14 DIAGNOSIS — L97.912 SKIN ULCER OF RIGHT LOWER LEG WITH FAT LAYER EXPOSED (H): Primary | ICD-10-CM

## 2023-09-14 PROCEDURE — G0463 HOSPITAL OUTPT CLINIC VISIT: HCPCS | Performed by: FAMILY MEDICINE

## 2023-09-14 PROCEDURE — 99215 OFFICE O/P EST HI 40 MIN: CPT | Performed by: FAMILY MEDICINE

## 2023-09-14 RX ORDER — FAMOTIDINE 40 MG/1
40 TABLET, FILM COATED ORAL EVERY EVENING
Status: ON HOLD | COMMUNITY
Start: 2023-09-08 | End: 2024-04-24

## 2023-09-14 RX ORDER — AZITHROMYCIN 500 MG/1
TABLET, FILM COATED ORAL
COMMUNITY
Start: 2023-09-08 | End: 2024-04-22

## 2023-09-14 RX ORDER — NYSTATIN 100000 [USP'U]/G
POWDER TOPICAL DAILY PRN
COMMUNITY
Start: 2023-07-08

## 2023-09-14 ASSESSMENT — PAIN SCALES - GENERAL: PAINLEVEL: MODERATE PAIN (5)

## 2023-09-14 NOTE — PROGRESS NOTES
Wound Clinic Note         Visit date: 09/14/2023       Cheif Complaint:     Irene Pena is a 77 year old  female had concerns including R leg wound.  The patient has lower extremity edema and a right leg ulcer.          HISTORY OF PRESENT ILLNESS:    Irene Pena reports the wound has been present since June 2023.  The wound began without a clear cause.  She previously saw Dr. Gottlieb here in the clinic to discuss treatment options for her venous insufficiency on April 26, 2023.  She also had a venous insufficiency performed that same day.  This did show some areas of superficial venous insufficiency.  Dr. Gottlieb offered her treatment for this superficial venous insufficiency however the patient declined at that time.  However later she continued to have difficulties with her legs and her primary care doctor referred her to vein [facility] where she ended up having a vein procedure on both legs performed.    She has recently had the wound bandaged with Telfa, Curlex and a short stretch compression wrap change 3 times a week by her neighbor who is a nurse.  Also has a Velcro compression garment which she can use for compression if needed.      The pateint denies fevers or chills.  They report the pain from the wound has been 5/10 and has remained about the same recently.      The patient reports they currently do not have any routine for elevating their legs.  and The patient confirms they do sleep in a bed.     Today the patient reports maintaining a regular diet without special attention to protein.        The patient denies a history of diabetes, smoking or chronic steroid use.         The patient has recently had some symptoms of wound infection and is currently taking antibiotics which they have been tolerating well with no symptoms of an adverse reaction.       Problem List:   No past medical history on file.          Family Hx: family history includes No Known Problems in her  daughter, father, maternal aunt, maternal grandfather, maternal grandmother, mother, paternal aunt, paternal grandfather, paternal grandmother, and sister.       Surgical Hx:   Past Surgical History:   Procedure Laterality Date    CHOLECYSTECTOMY      EYE SURGERY      STRIP VEIN Left 2006    rfa          Allergies:    Allergies   Allergen Reactions    Amoxicillin Nausea and Vomiting    Lisinopril Cough    Latex Rash              Medication History:    Current Outpatient Medications   Medication Sig    acetaZOLAMIDE (DIAMOX) 250 MG tablet [ACETAZOLAMIDE (DIAMOX) 250 MG TABLET]     aspirin 81 MG EC tablet [ASPIRIN 81 MG EC TABLET] Take 81 mg by mouth daily.    azithromycin (ZITHROMAX) 500 MG tablet     famotidine (PEPCID) 40 MG tablet     FOLIC ACID/MULTIVIT-MIN/LUTEIN (CENTRUM SILVER ORAL) [FOLIC ACID/MULTIVIT-MIN/LUTEIN (CENTRUM SILVER ORAL)] Take by mouth.    losartan (COZAAR) 100 MG tablet [LOSARTAN (COZAAR) 100 MG TABLET]     naproxen sodium (ALEVE) 220 MG tablet [NAPROXEN SODIUM (ALEVE) 220 MG TABLET] Take 220 mg by mouth every 12 (twelve) hours as needed for pain.    NYAMYC 100976 UNIT/GM external powder     omeprazole (PRILOSEC) 20 MG capsule [OMEPRAZOLE (PRILOSEC) 20 MG CAPSULE]     ranitidine (ZANTAC) 300 MG tablet [RANITIDINE (ZANTAC) 300 MG TABLET]     simvastatin (ZOCOR) 40 MG tablet [SIMVASTATIN (ZOCOR) 40 MG TABLET]     SYNTHROID 112 mcg tablet [SYNTHROID 112 MCG TABLET]     VANIQA 13.9 % cream [VANIQA 13.9 % CREAM]      No current facility-administered medications for this visit.         Tobacco History:  reports that she has never smoked. She has never used smokeless tobacco.       REVIEW OF SYMPTOMS:   The review of systems was negative except as noted in the HPI.           PHYSICAL EXAMINATION:     BP (!) 152/79   Pulse 64   SpO2 93%            GENERAL: The patient overall appears well and is no acute distress.   HEAD: normocephalic   EYES: Sclera and conjunctiva clear   NECK: no obvious masses    LUNGS: breathing is unlabored.   EXTREMITIES: No clubbing, cyanosis or edema   SKIN: No rashes or other abnormalities except as noted under the Wound section below.   NEUROLOGICAL: normal motor and sensory function   EDEMA: Moderate       WOUND: The wound appears healthy with no sign of infection.   Wound bed: granulation tissue  Periwound: healthy intact skin  There is just tiny pinpoint open areas remaining on the right lateral calf.      Also see below for wound details:       Circumferential volume measures:             No data to display                Ulceration(s)/Wound(s):   Please see the media tab under the chart review for pictures of the wounds.  Nursing staff removed dressings and cleansed wound.    VASC Wound Rt lateral leg (Active)   Description scattered weeping 09/14/23 1300             No results for input(s): HGBA1C, A1C, EAG in the last 92308 hours.    Invalid input(s): [id number]       No results for input(s): ALBUMIN in the last 70584 hours.           No debridement performed today.                  ASSESSMENT:   This is a 77 year old  female with a right leg ulcer. , the patient also has lower extremity edema which was also managed during today's clinic visit.          PLAN:   We will bandage the area with Hydrofera Blue, Curlex and her Velcro compression garment change 3 times a week.  We discussed the option of getting a new venous insufficiency ultrasound, however the patient chose to hold off on pursuing this option for now.    Separate from the wound care instructions we then discussed management strategies for lower extremity edema.  I explained the keys for managing lower extremity edema are compression and elevation.  I explained to the patient today that controlling the edema is probably the most important thing we can do to help heal the wound.  I have specifically recommended that they lay down with their legs above the level of the heart for 30 minutes at least twice a day.  and  I have also encouraged the patient to continue to sleep in a bed.     I have explained to the patient the importance of protein intake to wound healing.  I have explained that increasing protein intake will speed wound healing.  We discussed several types of food that are high in protein and the wound care nurse gave the patient a handout that summarizes this information.  In addition to further speed wound healing I have encouraged the patient to take a protein supplement.   The patient will return to the wound clinic in 2 to 3 weeks to see me again.        45 minutes spent on the date of the encounter doing chart review, history and exam, documentation and further activities per the note, this time excludes any procedure time      Silas Reece MD  09/14/2023   1:29 PM   [facility]  [phone]    This note was electronically signed by Silas Reece MD

## 2023-09-14 NOTE — PROGRESS NOTES
Compression Applied to Right  Tubigrip Size F and short stretch     Cannot order supplies for patient as she does not have measurable wounds and scant to no drainage.

## 2023-09-14 NOTE — PATIENT INSTRUCTIONS
"Wound care supplies were not ordered or needed today.        Wound Care Instructions    EVERY OTHER DAY and as needed, Cleanse your right leg wound(s) with clean tap water.    Pat Dry with non-sterile gauze    Apply Lotion to the intact skin surrounding your wound and other dry skin locations. Some good lotions include: Remedy Skin Repair Cream, Sarna, Vanicream or Cetaphil    Primary Dressing: Apply hydrofera blue ready transfer into/onto the wounds. If you run out of this, just use the ABD pad.    Secondary dressing: Cover with ABD    Secure with non-sterile roll gauze (4\" x 75\" roll) and tape (1\" roll tape) as needed; avoid adhesive directly on the skin    Compression: velcros or the lymphedema wraps    It is ok to get your wound wet in the bath or shower    It is recommended that you elevate your legs throughout the day: approx 2-3 times each day  Elevate them above the level of your heart for 30 min.   Ways to do this:   Lay on the couch or your bed and prop your legs up on pillows   Recline back as far as you can go in your recliner and prop your legs on pillows.     Doing these things will help reduce the edema in your legs.    High Protein Foods  When you have an open ulcer, your bodies protein needs are much higher, so it is recommended eat good sources of protein or take a protein supplement!    Protein Supplements  -Premier Protein  -Ensure  -Boost  -Glucerna, if diabetic    Chicken  -Chicken breast, 3.5oz.-30 grams protein  -Chicken meat, cooked, 4 oz.    Beef  -Hamburger tiana, 4 oz-28 grams protein  -Steak, 6 oz-42 grams  -Most cuts of beef- 7 grams of protein per ounce    Fish  -Most fish fillets or steaks are about 22 grams of protein for 3 1/2 oz(100 grams) of cooked fish, or 6 grams per ounce  -Tuna, 6 oz can-40 grams of protein    Pork  -Pork chop, average-22 grams protein  -Pork loin or tenderloin, 4 oz.-29 grams  -Ham, 3oz serving- 19 grams  -Mata, 1 slice-3 grams    Eggs and Dairy  -Egg, " large-7 grams  -Milk, 1 cup-8 grams  -Cottage cheese, 1/2 cup-15 grams  -Greek yogurt, 1 cup-usually 8-12 grams, check label    Beans  -Soy milk, 1 cup-6-10 grams  -Most beans(black, mcnair, lentils, etc.) about 7-10 grams protein per half cup of cooked beans    Nuts and Seeds  -Peanut butter, 2 Tablespoons- 8 grams protein  -Almonds, 1/4 cup- 8 grams  -Peanuts, 1/4 cup-9 grams  -Sunflower seeds, 1/4 cup- 6 grams      If for some reason you are not able to get your dressing(s) changed as outlined above (due to illness, lack of supplies, lack of help) please do the following: remove old, soiled dressings; wash the wounds with saline; pat dry; apply ABD pad or other absorbant pad and secure with rolled gauze; avoid tape directly on your skin; Call the clinic as soon as possible to let us know what the current issues are in receiving wound care [phone].      SEEK MEDICAL CARE IF:  You have an increase in swelling, pain, or redness around the wound.  You have an increase in the amount of pus coming from the wound.  There is a bad smell coming from the wound.  The wound appears to be worsening/enlarging  You have a fever greater than 101.5 F      It is ok to continue current wound care treatment/products for the next 2-3 days until new wound care supplies are ordered and arrive. If longer than this please contact our office at [phone].    If you have a 2 layer or 4 layer compression wrap on these are safe to have on for ONLY 7 days. If for some reason you are not able to get the wrap(s) changed (due to illness; lack of supplies, lack of help, lack of transportation) please do the following: unwrap the old 2 or 4 layer compression wrap; avoid using scissors as you could cut your skin and cause wounds; use tubular compression when available. Call to reschedule your home care or clinic visit appointment as soon as possible.    Please NOTE: if you are 15 minutes late to your clinic appointment you will have to be  rescheduled. Please call our clinic as soon as possible if you know you will not be able to get to your appointment at [phone].    If you fail to show up to 3 scheduled clinic appointments you will be dismissed from our clinic.              We want to hear from you!  In the next few weeks, you should receive a call or email to complete a survey about your visit at [facility]. Please help us improve your appointment experience by letting us know how we did today. We strive to make your experience good and value any ways in which we could do better.      We value your input and suggestions.    Thank you for choosing the [facility]!

## 2023-09-19 ENCOUNTER — THERAPY VISIT (OUTPATIENT)
Dept: PHYSICAL THERAPY | Facility: REHABILITATION | Age: 78
End: 2023-09-19
Payer: COMMERCIAL

## 2023-09-19 DIAGNOSIS — I89.0 SECONDARY LYMPHEDEMA: Primary | ICD-10-CM

## 2023-09-19 PROCEDURE — 97140 MANUAL THERAPY 1/> REGIONS: CPT | Mod: GP | Performed by: PHYSICAL THERAPIST

## 2023-09-19 NOTE — PROGRESS NOTES
DISCHARGE  Reason for Discharge: Patient has met all goals.  No further expectation of progress.    Equipment Issued: N/A    Discharge Plan: Patient to continue home program.    Referring Provider:  Ronn Coleman       09/19/23 0500   Appointment Info   Signing clinician's name / credentials Olga Saenz, PT, CLT   Total/Authorized Visits 34   Visits Used 32   Medical Diagnosis Lymphedema   PT Tx Diagnosis Secondary lymphedema   Other pertinent information current pair: mediven plus, petite, 20-30 mmHg, extrawide calf w/topband, III; also has velcro wraps for B LE - Circaid   Progress Note/Certification   Start of Care Date 05/26/23   Onset of illness/injury or Date of Surgery 03/01/23   Therapy Frequency 2-3x/week   Predicted Duration 34 visits total, up to 90 days   Certification date from 08/08/23   Certification date to 11/06/23   Progress Note Due Date   (visit 34)   Supervision   PT Assistant Visit Number 3   GOALS   PT Goals 2   PT Goal 1   Goal Identifier Volume   Goal Description Pt will demonstrate a reduction of 10% in R LE volume and 5% in L LE volume to allow her to fit into clothing better.   Rationale to maximize safety and independence with performance of ADLs and functional tasks   Goal Progress Goal met, over 10% reduction in each leg (this improved over time)   Target Date 10/11/23   Date Met 08/29/23   PT Goal 2   Goal Identifier Home management   Goal Description Pt will be indep with home management for swelling including use of compression, exercises and massage to maximize clinical gains and assist with wound healing.   Rationale to maximize safety and independence with performance of ADLs and functional tasks   Goal Progress Goal met, pt, neighbor and spouse assisting with home management - pt wearing compression garments daily (velcro) now and performing wound cares and exercises and skin care   Target Date 10/11/23   Date Met 09/19/23   Subjective Report   Subjective Report Pt saw  wound care last week and was given a few wound supplies, but unable to order more; pt will call them again to find out. Done with antibiotics now. The pain is slightly improved. Her neighbor continues to help and the drainage last time was minimal.   Objective Measures   Objective Measures Objective Measure 1;Objective Measure 2   Objective Measure 1   Objective Measure R LE skin/wounds   Details Improved R shin/lateral skin - small areas of dry and slightly moist skin, but area under hydrofera blue dressings looked dry and closed   Objective Measure 2   Objective Measure Volume   Details See separate flow sheet - improved measurement of R LE today   Treatment Interventions (PT)   Interventions Manual Therapy   Manual Therapy   Manual Therapy: Mobilization, MFR, MLD, friction massage minutes (98984) 58   Manual Therapy 1 MLD, compression bandaging   Manual Therapy 1 - Details Pt s spouse present throughout. Writer removed Velcro wrap from R LE at start of session and measured R LE and educated pt on progress. Washed R LE with soap/water and applied Eucerin lotion. Applied hydrofera blue (2 small pieces) to areas of R lateral leg where moisture was noted (no distinct wounds today), covered with ABD pad and gauze roll, then reapplied velcro wraps. Educated pt and spouse in how to perform these cares every other day per wound clinic directions.   Skilled Intervention Wound care, skin care, compression wraps   Patient Response/Progress Improved wounds, pt and spouse understood education   Plan   Home program Transition to velcro wraps with wound cares in place   Plan for next session N/A   Comments   Comments Pt has been seen for 32 visits from 5/26/23 to present with treatment focused on CDT. The course of therapy was complicated several times d/t wounds - previous wounds would heal and new areas would open in the R lower leg. She was on antibiotics twice in this course of therapy. She finally saw wound care last week  and was given some new supplies to try (hydrofera blue) and she has transitioned to Velcro wraps. At this time would like pt to continue to use Velcro wraps and to follow up only with wound clinic as the swelling/edema has been managed quite well and the size of both legs has gone down sufficiently.   Total Session Time   Timed Code Treatment Minutes 58   Total Treatment Time (sum of timed and untimed services) 58       Olga Saenz, PT, DPT, CLT  9/19/2023

## 2023-09-27 NOTE — PATIENT INSTRUCTIONS
Continue to use the ABD and rolled gauze for another week or so until there is no more drainage.     Continue to wear your velcro compression daily.

## 2023-10-05 ENCOUNTER — OFFICE VISIT (OUTPATIENT)
Dept: VASCULAR SURGERY | Facility: CLINIC | Age: 78
End: 2023-10-05
Attending: FAMILY MEDICINE
Payer: COMMERCIAL

## 2023-10-05 VITALS
SYSTOLIC BLOOD PRESSURE: 156 MMHG | DIASTOLIC BLOOD PRESSURE: 82 MMHG | HEART RATE: 66 BPM | TEMPERATURE: 97.6 F | RESPIRATION RATE: 12 BRPM | OXYGEN SATURATION: 94 %

## 2023-10-05 DIAGNOSIS — L97.912 SKIN ULCER OF RIGHT LOWER LEG WITH FAT LAYER EXPOSED (H): Primary | ICD-10-CM

## 2023-10-05 PROCEDURE — 99214 OFFICE O/P EST MOD 30 MIN: CPT | Performed by: FAMILY MEDICINE

## 2023-10-05 PROCEDURE — G0463 HOSPITAL OUTPT CLINIC VISIT: HCPCS | Performed by: FAMILY MEDICINE

## 2023-10-05 ASSESSMENT — PAIN SCALES - GENERAL: PAINLEVEL: MODERATE PAIN (5)

## 2023-10-05 NOTE — PROGRESS NOTES
Wound Clinic Note         Visit date: 10/05/2023       Cheif Complaint:     Irene Pena is a 77 year old  female had concerns including Wound Check (Right leg wound).  The patient has lower extremity edema and a right leg ulcer.          HISTORY OF PRESENT ILLNESS:    Irene Pena reports the wound has been present since June 2023.  The wound began without a clear cause.  She previously saw Dr. Gottlieb here in the clinic to discuss treatment options for her venous insufficiency on April 26, 2023.  She also had a venous insufficiency performed that same day.  This did show some areas of superficial venous insufficiency.  Dr. Gottlieb offered her treatment for this superficial venous insufficiency however the patient declined at that time.  However later she continued to have difficulties with her legs and her primary care doctor referred her to [facility] where she ended up having a vein procedure on both legs performed.    At her last clinic visit I had recommended that she bandaged her right leg wounds with Hydrofera Blue, an ABD pad and her Velcro compression garments changed 3 times a week.  However her insurance company did not cover Hydrofera Blue so she is just been using the ABD pads and her Velcro compression garments.  There is been little to no drainage from the area recently.      The pateint denies fevers or chills.  They report the pain from the wound has been 0/10 and has remained about the same recently.      The patient reports propping up their legs occasionally during the day, but they do not elevate their legs above the level of their heart.  and The patient confirms they do sleep in a bed.     Today the patient reports maintaining a high protein diet, but has not been taking protein supplements lately.        The patient denies a history of diabetes, smoking or chronic steroid use.         The patient has not had any symptoms of infection relating to the wound  recently and is not currently on antibiotics.       Problem List:   No past medical history on file.          Family Hx: family history includes No Known Problems in her daughter, father, maternal aunt, maternal grandfather, maternal grandmother, mother, paternal aunt, paternal grandfather, paternal grandmother, and sister.       Surgical Hx:   Past Surgical History:   Procedure Laterality Date    CHOLECYSTECTOMY      EYE SURGERY      STRIP VEIN Left 2006    rfa          Allergies:    Allergies   Allergen Reactions    Amoxicillin Nausea and Vomiting    Lisinopril Cough    Latex Rash              Medication History:    Current Outpatient Medications   Medication Sig    acetaZOLAMIDE (DIAMOX) 250 MG tablet [ACETAZOLAMIDE (DIAMOX) 250 MG TABLET]     aspirin 81 MG EC tablet [ASPIRIN 81 MG EC TABLET] Take 81 mg by mouth daily.    famotidine (PEPCID) 40 MG tablet     losartan (COZAAR) 100 MG tablet [LOSARTAN (COZAAR) 100 MG TABLET]     naproxen sodium (ALEVE) 220 MG tablet [NAPROXEN SODIUM (ALEVE) 220 MG TABLET] Take 220 mg by mouth every 12 (twelve) hours as needed for pain.    NYAMYC 080782 UNIT/GM external powder     omeprazole (PRILOSEC) 20 MG capsule [OMEPRAZOLE (PRILOSEC) 20 MG CAPSULE]     ranitidine (ZANTAC) 300 MG tablet [RANITIDINE (ZANTAC) 300 MG TABLET]     simvastatin (ZOCOR) 40 MG tablet [SIMVASTATIN (ZOCOR) 40 MG TABLET]     SYNTHROID 112 mcg tablet [SYNTHROID 112 MCG TABLET]     VANIQA 13.9 % cream [VANIQA 13.9 % CREAM]     azithromycin (ZITHROMAX) 500 MG tablet  (Patient not taking: Reported on 10/5/2023)    FOLIC ACID/MULTIVIT-MIN/LUTEIN (CENTRUM SILVER ORAL) [FOLIC ACID/MULTIVIT-MIN/LUTEIN (CENTRUM SILVER ORAL)] Take by mouth. (Patient not taking: Reported on 10/5/2023)     No current facility-administered medications for this visit.         Tobacco History:  reports that she has never smoked. She has never used smokeless tobacco.       REVIEW OF SYMPTOMS:   The review of systems was negative except as  noted in the HPI.           PHYSICAL EXAMINATION:     BP (!) 156/82   Pulse 66   Temp 97.6  F (36.4  C)   Resp 12   SpO2 94%            GENERAL: The patient overall appears well and is no acute distress.   HEAD: normocephalic   EYES: Sclera and conjunctiva clear   NECK: no obvious masses   LUNGS: breathing is unlabored.   EXTREMITIES: No clubbing, cyanosis or edema   SKIN: No rashes or other abnormalities except as noted under the Wound section below.   NEUROLOGICAL: normal motor and sensory function   EDEMA: Moderate       WOUND: The wound appears healthy with no sign of infection.   Wound bed: granulation tissue  Periwound: healthy intact skin  There are no distinct open areas remaining however there are tiny bits of drainage on the ABD pad she had on today.      Also see below for wound details:       Circumferential volume measures:             No data to display                Ulceration(s)/Wound(s):   Please see the media tab under the chart review for pictures of the wounds.  Nursing staff removed dressings and cleansed wound.    VASC Wound Rt lateral leg (Active)   Description scattered weeping 10/05/23 1400               No results for input(s): HGBA1C, A1C, EAG in the last 28048 hours.    Invalid input(s): [id number]       No results for input(s): ALBUMIN in the last 44622 hours.           No debridement performed today.                  ASSESSMENT:   This is a 77 year old  female with a right leg ulcer. , the patient also has lower extremity edema which was also managed during today's clinic visit.          PLAN:   I recommended she continue to keep the area as covered with an ABD pad and her Velcro compression garments changed 3 times a week until the drainage stops then she can just use her Velcro compression garment long-term to control her swelling.      Separate from the wound care instructions we then discussed management strategies for lower extremity edema.  I explained the keys for managing  lower extremity edema are compression and elevation.  I have again explained to the patient today that controlling the edema is probably the most important thing we can do to help heal the wound.  I have specifically recommended that they lay down with their legs above the level of the heart for 30 minutes at least twice a day.  I emphasized that if we can not control the edema we will likely not be able to get this wound to heal.  and I have also encouraged the patient to continue to sleep in a bed.     I have encouraged the patient to continue on their high protein diet to aid in wound healing.   The patient will return to the wound clinic on an as-needed basis if further wounds develop.        30 minutes spent on the date of the encounter doing chart review, history and exam, documentation and further activities per the note, this time excludes any procedure time      Silas Reece MD  10/05/2023   2:27 PM   [facility]  [phone]    This note was electronically signed by Silas Reece MD

## 2023-11-09 ENCOUNTER — OFFICE VISIT (OUTPATIENT)
Dept: VASCULAR SURGERY | Facility: CLINIC | Age: 78
End: 2023-11-09
Attending: FAMILY MEDICINE
Payer: COMMERCIAL

## 2023-11-09 VITALS — SYSTOLIC BLOOD PRESSURE: 160 MMHG | HEART RATE: 72 BPM | OXYGEN SATURATION: 97 % | DIASTOLIC BLOOD PRESSURE: 94 MMHG

## 2023-11-09 DIAGNOSIS — L97.912 SKIN ULCER OF RIGHT LOWER LEG WITH FAT LAYER EXPOSED (H): Primary | ICD-10-CM

## 2023-11-09 PROCEDURE — 99214 OFFICE O/P EST MOD 30 MIN: CPT | Performed by: FAMILY MEDICINE

## 2023-11-09 PROCEDURE — G0463 HOSPITAL OUTPT CLINIC VISIT: HCPCS | Performed by: FAMILY MEDICINE

## 2023-11-09 RX ORDER — TRIAMCINOLONE ACETONIDE 0.25 MG/G
CREAM TOPICAL 2 TIMES DAILY
Qty: 15 G | Refills: 3 | Status: SHIPPED | OUTPATIENT
Start: 2023-11-09 | End: 2024-04-22

## 2023-11-09 ASSESSMENT — PAIN SCALES - GENERAL: PAINLEVEL: MODERATE PAIN (5)

## 2023-11-09 NOTE — PATIENT INSTRUCTIONS
"Wound care supplies were not ordered or needed today.        Wound Care Instructions- you can order supplies on [employer]    EVERY OTHER DAY and as needed, Cleanse your right leg wound(s) with clean tap water.    Pat Dry with non-sterile gauze    Apply triamcinolone to the itchy areas, apply regular lotion to the rest of your skin    Primary Dressing: cover with hydrofera blue,  ABD    Secure with non-sterile roll gauze (4\" x 75\" roll) and tape (1\" roll tape) as needed; avoid adhesive directly on the skin    Compression: velcros     It is ok to get your wound wet in the bath or shower    It is recommended that you elevate your legs throughout the day: approx 2-3 times each day  Elevate them above the level of your heart for 30 min.   Ways to do this:   Lay on the couch or your bed and prop your legs up on pillows   Recline back as far as you can go in your recliner and prop your legs on pillows.     Doing these things will help reduce the edema in your legs.      If for some reason you are not able to get your dressing(s) changed as outlined above (due to illness, lack of supplies, lack of help) please do the following: remove old, soiled dressings; wash the wounds with saline; pat dry; apply ABD pad or other absorbant pad and secure with rolled gauze; avoid tape directly on your skin; Call the clinic as soon as possible to let us know what the current issues are in receiving wound care [phone].      SEEK MEDICAL CARE IF:  You have an increase in swelling, pain, or redness around the wound.  You have an increase in the amount of pus coming from the wound.  There is a bad smell coming from the wound.  The wound appears to be worsening/enlarging  You have a fever greater than 101.5 F      It is ok to continue current wound care treatment/products for the next 2-3 days until new wound care supplies are ordered and arrive. If longer than this please contact our office at [phone].    If you have a 2 layer or 4 " layer compression wrap on these are safe to have on for ONLY 7 days. If for some reason you are not able to get the wrap(s) changed (due to illness; lack of supplies, lack of help, lack of transportation) please do the following: unwrap the old 2 or 4 layer compression wrap; avoid using scissors as you could cut your skin and cause wounds; use tubular compression when available. Call to reschedule your home care or clinic visit appointment as soon as possible.    Please NOTE: if you are 15 minutes late to your clinic appointment you will have to be rescheduled. Please call our clinic as soon as possible if you know you will not be able to get to your appointment at [phone].    If you fail to show up to 3 scheduled clinic appointments you will be dismissed from our clinic.              We want to hear from you!  In the next few weeks, you should receive a call or email to complete a survey about your visit at [facility]. Please help us improve your appointment experience by letting us know how we did today. We strive to make your experience good and value any ways in which we could do better.      We value your input and suggestions.    Thank you for choosing the [facility]!

## 2023-11-09 NOTE — PROGRESS NOTES
Wound Clinic Note         Visit date: 11/09/2023       Cheif Complaint:     Irene Pena is a 77 year old  female had concerns including Wound Check.  The patient has lower extremity edema and a right leg ulcer.          HISTORY OF PRESENT ILLNESS:    Irene Pena reports the wound has been present since October 2023.  The wound began without a clear cause.  She previously saw Dr. Gottlieb here in the clinic to discuss treatment options for her venous insufficiency on April 26, 2023.  She also had a venous insufficiency performed that same day.  This did show some areas of superficial venous insufficiency.  Dr. Gottlieb offered her treatment for this superficial venous insufficiency however the patient declined at that time.  However later she continued to have difficulties with her legs and her primary care doctor referred her to [facility] where she ended up having a vein procedure on both legs performed.    I last saw this patient in the wound clinic on October 5, 2023 at which time all of her lower extremity wounds were healed.  She reports since then she is continue to wear her Velcro compression garment.  However she occasionally has quite a bit of itching in her legs and sometimes scratches her legs without noticing it causing new wounds to open up.  Recently since the wounds opened up due to scratching her friend has been applying a nonadherent pad followed by an ABD pad, roll gauze, tube stocking and her Velcro compression garment changed every other day.  There is been light serous drainage from the wound.        The pateint denies fevers or chills.  They report the pain from the wound has been 0/10 and has remained about the same recently.      The patient reports they currently do not have any routine for elevating their legs.  The patient confirms they do sleep in a bed.     Today the patient reports maintaining a high protein diet, but has not been taking protein  supplements lately.        The patient denies a history of diabetes, smoking or chronic steroid use.         The patient has not had any symptoms of infection relating to the wound recently and is not currently on antibiotics.       Problem List:   No past medical history on file.          Family Hx: family history includes No Known Problems in her daughter, father, maternal aunt, maternal grandfather, maternal grandmother, mother, paternal aunt, paternal grandfather, paternal grandmother, and sister.       Surgical Hx:   Past Surgical History:   Procedure Laterality Date    CHOLECYSTECTOMY      EYE SURGERY      STRIP VEIN Left 2006    rfa          Allergies:    Allergies   Allergen Reactions    Amoxicillin Nausea and Vomiting    Lisinopril Cough    Latex Rash              Medication History:    Current Outpatient Medications   Medication Sig    acetaZOLAMIDE (DIAMOX) 250 MG tablet [ACETAZOLAMIDE (DIAMOX) 250 MG TABLET]     aspirin 81 MG EC tablet [ASPIRIN 81 MG EC TABLET] Take 81 mg by mouth daily.    famotidine (PEPCID) 40 MG tablet     losartan (COZAAR) 100 MG tablet [LOSARTAN (COZAAR) 100 MG TABLET]     naproxen sodium (ALEVE) 220 MG tablet [NAPROXEN SODIUM (ALEVE) 220 MG TABLET] Take 220 mg by mouth every 12 (twelve) hours as needed for pain.    NYAMYC 860051 UNIT/GM external powder     omeprazole (PRILOSEC) 20 MG capsule [OMEPRAZOLE (PRILOSEC) 20 MG CAPSULE]     ranitidine (ZANTAC) 300 MG tablet [RANITIDINE (ZANTAC) 300 MG TABLET]     simvastatin (ZOCOR) 40 MG tablet [SIMVASTATIN (ZOCOR) 40 MG TABLET]     SYNTHROID 112 mcg tablet [SYNTHROID 112 MCG TABLET]     triamcinolone (KENALOG) 0.025 % cream Apply topically 2 times daily    VANIQA 13.9 % cream [VANIQA 13.9 % CREAM]     azithromycin (ZITHROMAX) 500 MG tablet  (Patient not taking: Reported on 10/5/2023)    FOLIC ACID/MULTIVIT-MIN/LUTEIN (CENTRUM SILVER ORAL) [FOLIC ACID/MULTIVIT-MIN/LUTEIN (CENTRUM SILVER ORAL)] Take by mouth. (Patient not taking: Reported  on 10/5/2023)     No current facility-administered medications for this visit.         Tobacco History:  reports that she has never smoked. She has never used smokeless tobacco.       REVIEW OF SYMPTOMS:   The review of systems was negative except as noted in the HPI.           PHYSICAL EXAMINATION:     BP (!) 160/94   Pulse 72   SpO2 97%            GENERAL: The patient overall appears well and is no acute distress.   HEAD: normocephalic   EYES: Sclera and conjunctiva clear   NECK: no obvious masses   LUNGS: breathing is unlabored.   EXTREMITIES: No clubbing, cyanosis or edema   SKIN: No rashes or other abnormalities except as noted under the Wound section below.   NEUROLOGICAL: normal motor and sensory function   EDEMA: Moderate       WOUND: The wound appears healthy with no sign of infection.   Wound bed: granulation tissue  Periwound: healthy intact skin  There is just a tiny very superficial partial-thickness opening on the right lateral calf consistent with an excoriation.      Also see below for wound details:       Circumferential volume measures:             No data to display                Ulceration(s)/Wound(s):   Please see the media tab under the chart review for pictures of the wounds.  Nursing staff removed dressings and cleansed wound.    VASC Wound Rt lateral leg (Active)   Description scattered weeping 11/09/23 0800                 No results for input(s): HGBA1C, A1C, EAG in the last 03804 hours.    Invalid input(s): [id number]       No results for input(s): ALBUMIN in the last 53879 hours.           No debridement performed today.                  ASSESSMENT:   This is a 77 year old  female with a right leg ulcer, the patient also has lower extremity edema which was also managed during today's clinic visit.          PLAN:   She will bandage the area with Hydrofera Blue, an ABD pad and her Velcro compression garments changed every other day or 3 times a week until the area heals up.  Then she  should continue to wear her Velcro compression garments every day to control her swelling.  I have put in a prescription for triamcinolone cream to her pharmacy which she can apply to her legs to all of the areas that itch with each dressing change.      Separate from the wound care instructions we then discussed management strategies for lower extremity edema.  I explained the keys for managing lower extremity edema are compression and elevation.  I have again explained to the patient today that controlling the edema is probably the most important thing we can do to help heal the wound.  I have specifically recommended that they lay down with their legs above the level of the heart for 30 minutes at least twice a day.  I emphasized that if we can not control the edema we will likely not be able to get this wound to heal.  and I have also encouraged the patient to continue to sleep in a bed.     I have encouraged the patient to continue on their high protein diet to aid in wound healing.   The patient will return to the wound clinic on an as-needed basis if further wounds develop.        30 minutes spent on the date of the encounter doing chart review, history and exam, documentation and further activities per the note, this time excludes any procedure time    Copied text has been reviewed and appropriate changes were made.      Silas Reece MD  11/09/2023   8:37 AM   [facility]  [phone]    This note was electronically signed by Silas Reece MD

## 2024-01-12 ENCOUNTER — TRANSCRIBE ORDERS (OUTPATIENT)
Dept: OTHER | Age: 79
End: 2024-01-12

## 2024-01-12 DIAGNOSIS — M79.604 LEG PAIN, RIGHT: Primary | ICD-10-CM

## 2024-01-15 ENCOUNTER — TRANSCRIBE ORDERS (OUTPATIENT)
Dept: OTHER | Age: 79
End: 2024-01-15

## 2024-01-17 ENCOUNTER — THERAPY VISIT (OUTPATIENT)
Dept: PHYSICAL THERAPY | Facility: REHABILITATION | Age: 79
End: 2024-01-17
Payer: COMMERCIAL

## 2024-01-17 DIAGNOSIS — I89.0 LYMPHEDEMA: ICD-10-CM

## 2024-01-17 PROCEDURE — 97162 PT EVAL MOD COMPLEX 30 MIN: CPT | Mod: GP | Performed by: PHYSICAL THERAPIST

## 2024-01-17 PROCEDURE — 97535 SELF CARE MNGMENT TRAINING: CPT | Mod: GP | Performed by: PHYSICAL THERAPIST

## 2024-01-17 NOTE — PROGRESS NOTES
PHYSICAL THERAPY EVALUATION  Type of Visit: Evaluation    See electronic medical record for Abuse and Falls Screening details.    Subjective   Pt is a 78 year-old woman self referred to PT for B LE swelling. Pt was previously seen for 34 visits from 5/25/23 to 9/19/23 with great reduction in size of legs but persistent wounds and infections. She transitioned to use of Velcro wraps at end of session. She was seen by wound care a few times after, but pt reports they weren't much help (the hydrofera blue didn't help and it was expensive). Over time her R leg has gotten worse (over the past 4 months).  She did see a specialist recently and he ordered a new wound care referral; pt was told that he is unable to help her beyond this. She reports that the right leg is still seeping a lot of fluid. The left leg is maintaining better with Velcro wraps. At night the R leg itches so bad. She reports using hydrofera blue regularly and now it has run out. She sees Dr. Reece next week for the first time since last fall. Her neighbor is still helping her every other day for wound cares.           Presenting condition or subjective complaint: Lymphedema on right leg  Date of onset: 03/01/23    Relevant medical history: Bladder or bowel problems; High blood pressure; Non-healing wounds; Overweight; Pain at night or rest; Sleep disorder like apnea; Thyroid problems; Vision problems   Dates & types of surgery: gall bladder removed 2005, lazy eye surgery 1995    Prior diagnostic imaging/testing results:       Prior therapy history for the same diagnosis, illness or injury: Yes 2023 5/25-9/19/23 for 34 visits    Prior Level of Function  Transfers: Independent  Ambulation: Independent  ADL: Independent    Living Environment  Social support: With a significant other or spouse   Type of home: House; 1 level   Stairs to enter the home: Yes       Ramp:     Stairs inside the home:         Help at home: Home and Yard maintenance  tasks  Equipment owned:       Employment:      Hobbies/Interests: Reading, walking, swim exercise class    Patient goals for therapy:      Pain assessment: Pain present  Location: right lower leg /Ratin/10     Objective       EDEMA EVALUATION  Additional history:  Body part affected by edema:  right>left leg  If cancer related, treatment:    If not cancer related, problems with veins or cause of swelling:  CVI, recurrent wounds and infections  Distance able to walk:    Time able to stand:    Sensation problems in hands/feet:      Edema etiology: Chronic Venous Insufficiency, Infection, Ulcers    FUNCTIONAL SCALES  Lower Extremity Functional Scale (score out of 80). A lower score indicates greater impairment:    Shoulder Pain and Disability Index (score out of 100).  A higher score indicates greater impairment:      Cognitive Status Examination  Orientation: Oriented to person, place and time   Level of Consciousness: Alert  Follows Commands and Answers Questions: 100% of the time  Personal Safety and Judgement: Intact  Memory: Impaired    EDEMA  Skin Condition: Dryness, Hemosiderin deposits, Pitting, Temperature, 3+ pitting B lower legs, reddened skin above/below wound bandages on right leg (these were not removed as pt did not bring additional wound supplies)    Stemmer Sign: +  Ulceration: Yes, right LE not observed (see above)    GIRTH MEASUREMENTS: Refer to separate girth measurement flowsheet.   right leg is +23% compared to 4 months ago, left leg is +7.4% compared to nearly 5 months ago    RANGE OF MOTION: B ankles decreased by 50% with functional movements and gait  STRENGTH: functional strength is decreased, pt moving very slowly for STS and sit<>supine, gait is very slow, small and shuffling step lengths with essentially no heel/toe off    Assessment & Plan   CLINICAL IMPRESSIONS  Medical Diagnosis: N/A, self referred    Treatment Diagnosis: Secondary lymphedema   Impression/Assessment: Pt is a 78  year-old woman self referred to PT for B LE swelling. Pt was previously seen for 34 visits from 5/25/23 to 9/19/23 (returning now to therapy 4 months later) with good reduction in size of legs, but persistent wounds and infections (on antibiotics twice during that time). She was using new Velcro wraps at conclusion of previous course of therapy and following up with wound care. Pt has since seen a new specialist and told to return to therapy as her R leg wounds have persisted. It is unclear if pt has a new infection at this time as she did not bring wound supplies with her today, so the R leg bandages were not removed. She will follow up again with the wound clinic next week and though pt can be seen again for consistent lymphedema therapy to reduce the volume of the legs (R leg is +23% compared to 4 months ago), it is crucial that pt be closely followed by the wound clinic, her PCP and any other specialists (infectious disease, vascular, etc.) to assist with maintaining any gains and addressing wounds, infections and other issues that are beyond the scope of a lymphedema therapist. In addition to complete decongestive therapy, pt's gait is much poorer, she is walking slowly and reports that she is extremely sedentary at home; she would benefit from exercises, balance and gait training as well.     Clinical Decision Making (Complexity):  Clinical Presentation: Evolving/Changing - persistent wounds, frequent infections, non-healing wounds despite improvement in volume of the legs and consistent therapy and home management  Clinical Presentation Rationale: based on medical and personal factors listed in PT evaluation  Clinical Decision Making (Complexity): Moderate complexity    PLAN OF CARE  Treatment Interventions:  Interventions: Gait Training, Manual Therapy, Neuromuscular Re-education, Therapeutic Activity, Therapeutic Exercise, Self-Care/Home Management, Gradient Compression Bandaging    Long Term Goals     PT  Goal 1  Goal Identifier: Home management  Goal Description: Pt will be independent with home management of swelling including skin care/precautions, exercise, follow up with other providers (wound clinic, PCP, vascular, etc), compression bandaging and use of compression garments.  Target Date: 04/15/24  PT Goal 2  Goal Identifier: Volume  Goal Description: Pt will demonstrate reduction in R LE volume of 23% and L LE volume of 7% to return to prior level of function.  Target Date: 04/16/24  PT Goal 3  Goal Identifier: Balance  Goal Description: Pt will demonstrate TUG in <13.5 sec with or without AD to indicate low falls risk  Goal Progress: Not tested at eval  Target Date: 04/16/24      Frequency of Treatment: 3x/week  Duration of Treatment: 24 visits, up to 90 days (pt cannot get in to clinic for 3x/week therapy for 4 weeks)    Recommended Referrals to Other Professionals: wound clinic (with consistent follow up over time), vascular clinic, infectious disease, close follow up with PCP  Education Assessment:   Learner/Method: Patient  Education Comments: Pt needed a lot of reiteration of information; admits to forgetting a lot of things. Provided written instructions and notes for pt    Risks and benefits of evaluation/treatment have been explained.   Patient/Family/caregiver agrees with Plan of Care.     Evaluation Time:     PT Rose Marie, Moderate Complexity Minutes (92555): 36       Signing Clinician: Olga Saenz, PT      [facility]                                                                                   OUTPATIENT PHYSICAL THERAPY        PLAN OF TREATMENT FOR OUTPATIENT REHABILITATION   Patient's Last Name, First Name, KOKOALBA PenaIrene  L YOB: 1945   Provider's Name   [facility]   Medical Record No.  [id number]     Onset Date: 03/01/23  Start of Care Date: 01/17/24     Medical Diagnosis:  N/A, self referred      PT  Treatment Diagnosis:  Secondary lymphedema Plan of Treatment  Frequency/Duration: 3x/week/ 24 visits, up to 90 days (pt cannot get in to clinic for 3x/week therapy for 4 weeks)    Certification date from 01/17/24 to 04/15/24         See note for plan of treatment details and functional goals     Olga Saenz, PT                         I CERTIFY THE NEED FOR THESE SERVICES FURNISHED UNDER        THIS PLAN OF TREATMENT AND WHILE UNDER MY CARE .             Physician Signature               Date    X_____________________________________________________                  Referring Provider:  Dr. Ronn Coleman       Initial Assessment  See Epic Evaluation- Start of Care Date: 01/17/24

## 2024-01-18 PROBLEM — I89.0 LYMPHEDEMA: Status: ACTIVE | Noted: 2024-01-18

## 2024-01-18 NOTE — PATIENT INSTRUCTIONS
"Wound care supplies were ordered today through [employer] and if you are not receiving your supplies or have a question on your bill please contact Josefa Collins at [phone]. Please allow 2-5 business days for delivery of supplies. You may get a call from a [phone] if there are additional information Giorgi needs. It is important to  or return their call. PLEASE NOTE: If you need to return your supplies, you MUST call customer service within 15 days of delivery date.         Wound Care Instructions-    EVERY OTHER DAY and as needed, Cleanse your right leg wound(s) with clean tap water.    Pat Dry with non-sterile gauze    Apply zinc oxide barrier cream(you can purchase Desitin over the counter)     Primary Dressing: aquacel AG,  ABD    Secure with non-sterile roll gauze (4\" x 75\" roll) and tape (1\" roll tape) as needed; avoid adhesive directly on the skin    Compression: velcros     It is ok to get your wound wet in the bath or shower    It is recommended that you elevate your legs throughout the day: approx 2-3 times each day  Elevate them above the level of your heart for 30 min.   Ways to do this:   Lay on the couch or your bed and prop your legs up on pillows   Recline back as far as you can go in your recliner and prop your legs on pillows.     Doing these things will help reduce the edema in your legs.      If for some reason you are not able to get your dressing(s) changed as outlined above (due to illness, lack of supplies, lack of help) please do the following: remove old, soiled dressings; wash the wounds with saline; pat dry; apply ABD pad or other absorbant pad and secure with rolled gauze; avoid tape directly on your skin; Call the clinic as soon as possible to let us know what the current issues are in receiving wound care [phone].      SEEK MEDICAL CARE IF:  You have an increase in swelling, pain, or redness around the wound.  You have an increase in the amount of pus coming from the " wound.  There is a bad smell coming from the wound.  The wound appears to be worsening/enlarging  You have a fever greater than 101.5 F      It is ok to continue current wound care treatment/products for the next 2-3 days until new wound care supplies are ordered and arrive. If longer than this please contact our office at [phone].    If you have a 2 layer or 4 layer compression wrap on these are safe to have on for ONLY 7 days. If for some reason you are not able to get the wrap(s) changed (due to illness; lack of supplies, lack of help, lack of transportation) please do the following: unwrap the old 2 or 4 layer compression wrap; avoid using scissors as you could cut your skin and cause wounds; use tubular compression when available. Call to reschedule your home care or clinic visit appointment as soon as possible.    Please NOTE: if you are 15 minutes late to your clinic appointment you will have to be rescheduled. Please call our clinic as soon as possible if you know you will not be able to get to your appointment at [phone].    If you fail to show up to 3 scheduled clinic appointments you will be dismissed from our clinic.              We want to hear from you!  In the next few weeks, you should receive a call or email to complete a survey about your visit at [facility]. Please help us improve your appointment experience by letting us know how we did today. We strive to make your experience good and value any ways in which we could do better.      We value your input and suggestions.    Thank you for choosing the [facility]!

## 2024-01-19 ENCOUNTER — THERAPY VISIT (OUTPATIENT)
Dept: PHYSICAL THERAPY | Facility: REHABILITATION | Age: 79
End: 2024-01-19
Payer: COMMERCIAL

## 2024-01-19 ENCOUNTER — TRANSCRIBE ORDERS (OUTPATIENT)
Dept: OTHER | Age: 79
End: 2024-01-19

## 2024-01-19 DIAGNOSIS — I89.0 LYMPHEDEMA: ICD-10-CM

## 2024-01-19 DIAGNOSIS — I89.0 SECONDARY LYMPHEDEMA: Primary | ICD-10-CM

## 2024-01-19 PROCEDURE — 97140 MANUAL THERAPY 1/> REGIONS: CPT | Mod: GP | Performed by: PHYSICAL THERAPIST

## 2024-01-25 ENCOUNTER — OFFICE VISIT (OUTPATIENT)
Dept: VASCULAR SURGERY | Facility: CLINIC | Age: 79
End: 2024-01-25
Attending: SURGERY
Payer: COMMERCIAL

## 2024-01-25 VITALS — SYSTOLIC BLOOD PRESSURE: 185 MMHG | OXYGEN SATURATION: 96 % | DIASTOLIC BLOOD PRESSURE: 77 MMHG | HEART RATE: 66 BPM

## 2024-01-25 DIAGNOSIS — I89.0 LYMPHEDEMA: ICD-10-CM

## 2024-01-25 DIAGNOSIS — M79.604 LEG PAIN, RIGHT: ICD-10-CM

## 2024-01-25 DIAGNOSIS — L97.912 SKIN ULCER OF RIGHT LOWER LEG WITH FAT LAYER EXPOSED (H): Primary | ICD-10-CM

## 2024-01-25 PROCEDURE — G0463 HOSPITAL OUTPT CLINIC VISIT: HCPCS | Performed by: FAMILY MEDICINE

## 2024-01-25 PROCEDURE — 99214 OFFICE O/P EST MOD 30 MIN: CPT | Performed by: FAMILY MEDICINE

## 2024-01-25 ASSESSMENT — PAIN SCALES - GENERAL: PAINLEVEL: MODERATE PAIN (5)

## 2024-01-25 NOTE — PROGRESS NOTES
Wound Clinic Note         Visit date: 01/25/2024       Cheif Complaint:     Irene Pena is a 78 year old  female had concerns including Rt leg pain.  The patient has lower extremity edema and a right leg ulcer.          HISTORY OF PRESENT ILLNESS:    Irene Pena reports the wound has been present since September 2023.  The wound began without a clear cause.  She previously saw Dr. Gottlieb here in the clinic to discuss treatment options for her venous insufficiency on April 26, 2023.  She also had a venous insufficiency performed that same day.  This did show some areas of superficial venous insufficiency.  Dr. Gottlieb offered her treatment for this superficial venous insufficiency however the patient declined at that time.  However later she continued to have difficulties with her legs and her primary care doctor referred her to vein [facility] where she ended up having a vein procedure on both legs performed.    I last saw this patient in the wound clinic on November 9, 2023 at which time she had just a very superficial opening on the right leg consistent with a excoriation.  We gave her some bandage directions with the expectation that the area would heal within the next week or so and did not schedule any follow-up appointments.  She reports however that she is continue to have drainage from this area ever since then.  She tried using the Hydrofera Blue bandage but did not feel like it it did anything to help.  Recently she has been bandaging the area with ABD pads and her Velcro compression garments changed every other day.  There is been moderate serous drainage from her leg.    She confirms that her Velcro compression garment is still very tight.      The pateint denies fevers or chills.  They report the pain from the wound has been 0/10 and has remained about the same recently.      The patient reports laying down to elevate their legs above the level of their heart at least  twice a day.  The patient confirms they do sleep in a bed.     Today the patient reports maintaining a high protein diet, but has not been taking protein supplements lately.        The patient denies a history of diabetes, smoking or chronic steroid use.         The patient has not had any symptoms of infection relating to the wound recently and is not currently on antibiotics.       Problem List:   No past medical history on file.          Family Hx: family history includes No Known Problems in her daughter, father, maternal aunt, maternal grandfather, maternal grandmother, mother, paternal aunt, paternal grandfather, paternal grandmother, and sister.       Surgical Hx:   Past Surgical History:   Procedure Laterality Date    CHOLECYSTECTOMY      EYE SURGERY      STRIP VEIN Left 2006    rfa          Allergies:    Allergies   Allergen Reactions    Amoxicillin Nausea and Vomiting    Lisinopril Cough    Latex Rash              Medication History:    Current Outpatient Medications   Medication Sig    acetaZOLAMIDE (DIAMOX) 250 MG tablet [ACETAZOLAMIDE (DIAMOX) 250 MG TABLET]     aspirin 81 MG EC tablet [ASPIRIN 81 MG EC TABLET] Take 81 mg by mouth daily.    azithromycin (ZITHROMAX) 500 MG tablet  (Patient not taking: Reported on 10/5/2023)    famotidine (PEPCID) 40 MG tablet     FOLIC ACID/MULTIVIT-MIN/LUTEIN (CENTRUM SILVER ORAL) [FOLIC ACID/MULTIVIT-MIN/LUTEIN (CENTRUM SILVER ORAL)] Take by mouth. (Patient not taking: Reported on 10/5/2023)    losartan (COZAAR) 100 MG tablet [LOSARTAN (COZAAR) 100 MG TABLET]     naproxen sodium (ALEVE) 220 MG tablet [NAPROXEN SODIUM (ALEVE) 220 MG TABLET] Take 220 mg by mouth every 12 (twelve) hours as needed for pain.    NYAMYC 966303 UNIT/GM external powder     omeprazole (PRILOSEC) 20 MG capsule [OMEPRAZOLE (PRILOSEC) 20 MG CAPSULE]     ranitidine (ZANTAC) 300 MG tablet [RANITIDINE (ZANTAC) 300 MG TABLET]     simvastatin (ZOCOR) 40 MG tablet [SIMVASTATIN (ZOCOR) 40 MG TABLET]      SYNTHROID 112 mcg tablet [SYNTHROID 112 MCG TABLET]     triamcinolone (KENALOG) 0.025 % cream Apply topically 2 times daily    VANIQA 13.9 % cream [VANIQA 13.9 % CREAM]      No current facility-administered medications for this visit.         Tobacco History:  reports that she has never smoked. She has never used smokeless tobacco.       REVIEW OF SYMPTOMS:   The review of systems was negative except as noted in the HPI.           PHYSICAL EXAMINATION:     There were no vitals taken for this visit.           GENERAL: The patient overall appears well and is no acute distress.   HEAD: normocephalic   EYES: Sclera and conjunctiva clear   NECK: no obvious masses   LUNGS: breathing is unlabored.   EXTREMITIES: No clubbing, cyanosis or edema   SKIN: No rashes or other abnormalities except as noted under the Wound section below.   NEUROLOGICAL: normal motor and sensory function   EDEMA: Moderate       WOUND: The wound appears healthy with no sign of infection.   Wound bed: granulation tissue  Periwound: healthy intact skin  She has a number of very superficial open areas along mostly the right medial and posterior calf with edema fluid percolating out.      Also see below for wound details:       Circumferential volume measures:             No data to display                Ulceration(s)/Wound(s):   Please see the media tab under the chart review for pictures of the wounds.  Nursing staff removed dressings and cleansed wound.                     No results for input(s): HGBA1C, A1C, EAG in the last 94292 hours.    Invalid input(s): [id number]       No results for input(s): ALBUMIN in the last 32481 hours.           No debridement performed today.                  ASSESSMENT:   This is a 78 year old  female with a right leg ulcer, the patient also has lower extremity edema which was also managed during today's clinic visit.          PLAN:   She will bandage the area with a zinc oxide barrier cream applied to the periwound,  alginate, an ABD pad and her Velcro compression garments changed every day or every other day depending on the drainage.    We will order a new right leg venous insufficiency ultrasound to evaluate for areas of superficial venous insufficiency  I have also asked her to speak with her primary care doctor about possibly starting on a diuretic to help control her swelling.      Separate from the wound care instructions we then discussed management strategies for lower extremity edema.  I explained the keys for managing lower extremity edema are compression and elevation.  I have encouraged the patient to continue to elevate the legs as they have been doing, including laying down with their legs above the level of the heart for 30 minutes at least twice a day.  I have also encouraged the patient to continue to sleep in a bed.     I have encouraged the patient to continue on their high protein diet to aid in wound healing.   The patient will return to the wound clinic in 2 to 3 weeks to see me again.        30 minutes spent on the date of the encounter doing chart review, history and exam, documentation and further activities per the note, this time excludes any procedure time          Silas Reece MD  01/25/2024   3:07 PM   [facility]  [phone]    This note was electronically signed by Silas Reece MD

## 2024-01-25 NOTE — LETTER
[facility]  [address] [place] [address] [address]  [place], [place] [address]  [phone]      Fax [phone]    [facility]           Fax: [phone]            Customer Service: [phone]        Account #: [id number]    Wound Dressing Rx and Order Form  Order Status: New   Verbal: Yany   Date: 2024     Irene Pena  Gender: female  : 1945  [address]  [place] [address]  [phone] (home)     Medical Record: [id number]  Primary Care Provider: Ronn Coleman      ICD-10-CM    1. Skin ulcer of right lower leg with fat layer exposed (H)  L97.912 US Venous Competency Right      2. Leg pain, right  M79.604 Wound Care Referral      3. Lymphedema  I89.0             Insurance Info:  INSURER: Payor: [employer] / Plan: [employer] MEDICARE ADVANTAGE / Product Type: Medicare /   Policy ID#:  [id number]  SECONDARY INSURANCE:    Secondary Policy ID#:  N/A        Physician Info:   Name:  RADHA CAVAZOS     Dept Address/Phones:   [address] [place] [address], [address]  [facility] [address] [phone]  Fax: [phone]    Lymphedema circumferential measurements (in cm):       No data to display                  Wound info:  VASC Wound Rt lateral leg (Active)   Pre Size Length 17 24 1500   Pre Size Width 20 24 1500   Pre Size Depth 0.1 24 1500   Pre Total Sq cm 340 24 1500   Description scattered weeping- unable to measure 24 1500   Number of days: 133        Drainage: moderate  Thickness:  full  Duration of Need: 30 DAYS  Days Supply: 30 DAYS  Start Date: 2024  Starter Kit, Ancillary Kit (saline, gloves, gauze): Yes   Qualifying wound/Debridement: Yes     NO SUBSTITUTIONS. Call [phone].      Dressing Type Brand Size Frequency of change  Quantity   Primary Aquacel AG  6x6 DAILY and as needed 60(2 per dressing change)           Secondary         ABD pad   8x10 DAILY and as needed 60(2 per dressing change)     Soft formed rolled gauze   4x75 DAILY and as needed 30      NO SUBSTITUTIONS. Call [phone] with questions.       OK to forward to covered supplier.    Electronically Signed Physician:  RADHA CAVAZOS             Date: January 25, 2024

## 2024-01-31 ENCOUNTER — THERAPY VISIT (OUTPATIENT)
Dept: PHYSICAL THERAPY | Facility: REHABILITATION | Age: 79
End: 2024-01-31
Payer: COMMERCIAL

## 2024-01-31 DIAGNOSIS — I89.0 LYMPHEDEMA: ICD-10-CM

## 2024-01-31 DIAGNOSIS — I89.0 SECONDARY LYMPHEDEMA: Primary | ICD-10-CM

## 2024-01-31 PROCEDURE — 97140 MANUAL THERAPY 1/> REGIONS: CPT | Mod: GP | Performed by: PHYSICAL THERAPIST

## 2024-02-01 ENCOUNTER — TELEPHONE (OUTPATIENT)
Dept: VASCULAR SURGERY | Facility: CLINIC | Age: 79
End: 2024-02-01
Payer: COMMERCIAL

## 2024-02-01 DIAGNOSIS — L97.912 SKIN ULCER OF RIGHT LOWER LEG WITH FAT LAYER EXPOSED (H): Primary | ICD-10-CM

## 2024-02-01 DIAGNOSIS — I89.0 LYMPHEDEMA: ICD-10-CM

## 2024-02-02 NOTE — TELEPHONE ENCOUNTER
LMTCB to discuss. Ok to do bilateral.  Per lymphedema therapy, patient also has wounds on LLE.  Patient will need to arrive by 1:15pm on 2/15 to ultrasound both legs.

## 2024-02-06 ENCOUNTER — THERAPY VISIT (OUTPATIENT)
Dept: PHYSICAL THERAPY | Facility: REHABILITATION | Age: 79
End: 2024-02-06
Payer: COMMERCIAL

## 2024-02-06 DIAGNOSIS — I89.0 SECONDARY LYMPHEDEMA: Primary | ICD-10-CM

## 2024-02-06 DIAGNOSIS — I89.0 LYMPHEDEMA: ICD-10-CM

## 2024-02-06 PROCEDURE — 97140 MANUAL THERAPY 1/> REGIONS: CPT | Mod: GP | Performed by: PHYSICAL THERAPIST

## 2024-02-12 ENCOUNTER — THERAPY VISIT (OUTPATIENT)
Dept: PHYSICAL THERAPY | Facility: REHABILITATION | Age: 79
End: 2024-02-12
Payer: COMMERCIAL

## 2024-02-12 DIAGNOSIS — I89.0 SECONDARY LYMPHEDEMA: Primary | ICD-10-CM

## 2024-02-12 DIAGNOSIS — I89.0 LYMPHEDEMA: ICD-10-CM

## 2024-02-12 PROCEDURE — 97140 MANUAL THERAPY 1/> REGIONS: CPT | Mod: GP | Performed by: PHYSICAL THERAPIST

## 2024-02-12 NOTE — PATIENT INSTRUCTIONS
"  Wound Care Instructions- continue for another week, then can stop using the dressings.    EVERY OTHER DAY and as needed, Cleanse your right leg wound(s) with clean tap water.    Pat Dry with non-sterile gauze    Primary Dressing:  ABD    Secure with non-sterile roll gauze (4\" x 75\" roll) and tape (1\" roll tape) as needed; avoid adhesive directly on the skin    Compression: [email or web address]     It is ok to get your wound wet in the bath or shower    It is recommended that you elevate your legs throughout the day: approx 2-3 times each day  Elevate them above the level of your heart for 30 min.   Ways to do this:   Lay on the couch or your bed and prop your legs up on pillows   Recline back as far as you can go in your recliner and prop your legs on pillows.     Doing these things will help reduce the edema in your legs.      If for some reason you are not able to get your dressing(s) changed as outlined above (due to illness, lack of supplies, lack of help) please do the following: remove old, soiled dressings; wash the wounds with saline; pat dry; apply ABD pad or other absorbant pad and secure with rolled gauze; avoid tape directly on your skin; Call the clinic as soon as possible to let us know what the current issues are in receiving wound care [phone].      SEEK MEDICAL CARE IF:  You have an increase in swelling, pain, or redness around the wound.  You have an increase in the amount of pus coming from the wound.  There is a bad smell coming from the wound.  The wound appears to be worsening/enlarging  You have a fever greater than 101.5 F      It is ok to continue current wound care treatment/products for the next 2-3 days until new wound care supplies are ordered and arrive. If longer than this please contact our office at [phone].    If you have a 2 layer or 4 layer compression wrap on these are safe to have on for ONLY 7 days. If for some reason you are not able to get the wrap(s) changed (due to illness; " lack of supplies, lack of help, lack of transportation) please do the following: unwrap the old 2 or 4 layer compression wrap; avoid using scissors as you could cut your skin and cause wounds; use tubular compression when available. Call to reschedule your home care or clinic visit appointment as soon as possible.    Please NOTE: if you are 15 minutes late to your clinic appointment you will have to be rescheduled. Please call our clinic as soon as possible if you know you will not be able to get to your appointment at [phone].    If you fail to show up to 3 scheduled clinic appointments you will be dismissed from our clinic.              We want to hear from you!  In the next few weeks, you should receive a call or email to complete a survey about your visit at [facility]. Please help us improve your appointment experience by letting us know how we did today. We strive to make your experience good and value any ways in which we could do better.      We value your input and suggestions.    Thank you for choosing the [facility]!

## 2024-02-13 DIAGNOSIS — I89.0 LYMPHEDEMA: Primary | ICD-10-CM

## 2024-02-14 ENCOUNTER — THERAPY VISIT (OUTPATIENT)
Dept: PHYSICAL THERAPY | Facility: REHABILITATION | Age: 79
End: 2024-02-14
Payer: COMMERCIAL

## 2024-02-14 DIAGNOSIS — I89.0 SECONDARY LYMPHEDEMA: Primary | ICD-10-CM

## 2024-02-14 DIAGNOSIS — I89.0 LYMPHEDEMA: ICD-10-CM

## 2024-02-14 PROCEDURE — 97140 MANUAL THERAPY 1/> REGIONS: CPT | Mod: GP | Performed by: PHYSICAL THERAPIST

## 2024-02-15 ENCOUNTER — ANCILLARY PROCEDURE (OUTPATIENT)
Dept: VASCULAR ULTRASOUND | Facility: CLINIC | Age: 79
End: 2024-02-15
Attending: FAMILY MEDICINE
Payer: COMMERCIAL

## 2024-02-15 ENCOUNTER — OFFICE VISIT (OUTPATIENT)
Dept: VASCULAR SURGERY | Facility: CLINIC | Age: 79
End: 2024-02-15
Attending: FAMILY MEDICINE
Payer: COMMERCIAL

## 2024-02-15 VITALS — OXYGEN SATURATION: 96 % | HEART RATE: 60 BPM | DIASTOLIC BLOOD PRESSURE: 76 MMHG | SYSTOLIC BLOOD PRESSURE: 156 MMHG

## 2024-02-15 DIAGNOSIS — I89.0 LYMPHEDEMA: ICD-10-CM

## 2024-02-15 DIAGNOSIS — L97.912 SKIN ULCER OF RIGHT LOWER LEG WITH FAT LAYER EXPOSED (H): ICD-10-CM

## 2024-02-15 DIAGNOSIS — L97.912 SKIN ULCER OF RIGHT LOWER LEG WITH FAT LAYER EXPOSED (H): Primary | ICD-10-CM

## 2024-02-15 PROCEDURE — 99214 OFFICE O/P EST MOD 30 MIN: CPT | Performed by: FAMILY MEDICINE

## 2024-02-15 PROCEDURE — G0463 HOSPITAL OUTPT CLINIC VISIT: HCPCS | Mod: 25 | Performed by: FAMILY MEDICINE

## 2024-02-15 PROCEDURE — 93970 EXTREMITY STUDY: CPT

## 2024-02-15 PROCEDURE — 93970 EXTREMITY STUDY: CPT | Mod: 26 | Performed by: SURGERY

## 2024-02-15 ASSESSMENT — PAIN SCALES - GENERAL: PAINLEVEL: MILD PAIN (3)

## 2024-02-15 NOTE — PROGRESS NOTES
Wound Clinic Note         Visit date: 02/15/2024       Cheif Complaint:     Irene Pena is a 78 year old  female had concerns including right leg wound .  The patient has lower extremity edema and a right leg ulcer.          HISTORY OF PRESENT ILLNESS:    Irene Pena reports the wound has been present since September 2023.  The wound began without a clear cause.  She previously saw Dr. Gottlieb here in the clinic to discuss treatment options for her venous insufficiency on April 26, 2023.  She also had a venous insufficiency performed that same day.  This did show some areas of superficial venous insufficiency.  Dr. Gottlieb offered her treatment for this superficial venous insufficiency however the patient declined at that time.  However later she continued to have difficulties with her legs and her primary care doctor referred her to vein [facility] where she ended up having a vein procedure on both legs performed.    Since her last clinic visit with me her physical therapist and a neighbor have been helping to do the dressing changes.  The wounds have been bandaged with a zinc oxide barrier cream applied to the periwound, alginate, an ABD pad and her Velcro compression garment all changed every other day.  There is been little to no drainage from the areas recently.        The pateint denies fevers or chills.  They report the pain from the wound has been 0/10 and has remained about the same recently.      The patient reports laying down to elevate their legs above the level of their heart at least twice a day.  The patient confirms they do sleep in a bed.     Today the patient reports maintaining a high protein diet, but has not been taking protein supplements lately.        The patient denies a history of diabetes, smoking or chronic steroid use.         The patient has not had any symptoms of infection relating to the wound recently and is not currently on antibiotics.        Problem List:   No past medical history on file.          Family Hx: family history includes No Known Problems in her daughter, father, maternal aunt, maternal grandfather, maternal grandmother, mother, paternal aunt, paternal grandfather, paternal grandmother, and sister.       Surgical Hx:   Past Surgical History:   Procedure Laterality Date    CHOLECYSTECTOMY      EYE SURGERY      STRIP VEIN Left 2006    rfa          Allergies:    Allergies   Allergen Reactions    Amoxicillin Nausea and Vomiting    Lisinopril Cough    Latex Rash              Medication History:    Current Outpatient Medications   Medication Sig    acetaZOLAMIDE (DIAMOX) 250 MG tablet [ACETAZOLAMIDE (DIAMOX) 250 MG TABLET]  (Patient not taking: Reported on 1/25/2024)    aspirin 81 MG EC tablet [ASPIRIN 81 MG EC TABLET] Take 81 mg by mouth daily.    azithromycin (ZITHROMAX) 500 MG tablet  (Patient not taking: Reported on 10/5/2023)    famotidine (PEPCID) 40 MG tablet     FOLIC ACID/MULTIVIT-MIN/LUTEIN (CENTRUM SILVER ORAL) [FOLIC ACID/MULTIVIT-MIN/LUTEIN (CENTRUM SILVER ORAL)] Take by mouth. (Patient not taking: Reported on 1/25/2024)    losartan (COZAAR) 100 MG tablet [LOSARTAN (COZAAR) 100 MG TABLET]     naproxen sodium (ALEVE) 220 MG tablet [NAPROXEN SODIUM (ALEVE) 220 MG TABLET] Take 220 mg by mouth every 12 (twelve) hours as needed for pain. (Patient not taking: Reported on 1/25/2024)    NYAMYC 344251 UNIT/GM external powder     omeprazole (PRILOSEC) 20 MG capsule [OMEPRAZOLE (PRILOSEC) 20 MG CAPSULE]  (Patient not taking: Reported on 1/25/2024)    ranitidine (ZANTAC) 300 MG tablet [RANITIDINE (ZANTAC) 300 MG TABLET]     simvastatin (ZOCOR) 40 MG tablet [SIMVASTATIN (ZOCOR) 40 MG TABLET]     SYNTHROID 112 mcg tablet [SYNTHROID 112 MCG TABLET]     triamcinolone (KENALOG) 0.025 % cream Apply topically 2 times daily    VANIQA 13.9 % cream [VANIQA 13.9 % CREAM]      No current facility-administered medications for this visit.         Tobacco  History:  reports that she has never smoked. She has never used smokeless tobacco.       REVIEW OF SYMPTOMS:   The review of systems was negative except as noted in the HPI.           PHYSICAL EXAMINATION:     BP (!) 156/76   Pulse 60   SpO2 96%            GENERAL: The patient overall appears well and is no acute distress.   HEAD: normocephalic   EYES: Sclera and conjunctiva clear   NECK: no obvious masses   LUNGS: breathing is unlabored.   EXTREMITIES: No clubbing, cyanosis or edema   SKIN: No rashes or other abnormalities except as noted under the Wound section below.   NEUROLOGICAL: normal motor and sensory function   EDEMA: Moderate       WOUND: Healed      Also see below for wound details:       Circumferential volume measures:             No data to display                Ulceration(s)/Wound(s):   Please see the media tab under the chart review for pictures of the wounds.  Nursing staff removed dressings and cleansed wound.    VASC Wound Rt lateral leg (Active)   Description scattered on calf 02/15/24 1500                   No results for input(s): HGBA1C, A1C, EAG in the last 79058 hours.    Invalid input(s): [id number]       No results for input(s): ALBUMIN in the last 03925 hours.           No debridement performed today.                  ASSESSMENT:   This is a 78 year old  female with a healed right leg ulcer, the patient also has lower extremity edema which was also managed during today's clinic visit.          PLAN:   I recommend that she continue to keep the area covered with an ABD pad along with her Velcro compression garment for another week to make sure there is no drainage from the areas and to protect the new skin which is covered over the wounds after this no further bandages to be required.  However she will need to continue to wear her Velcro compression garments every day to control her swelling.      Separate from the wound care instructions we then discussed management strategies for  lower extremity edema.  I explained the keys for managing lower extremity edema are compression and elevation.  I have encouraged the patient to continue to elevate the legs as they have been doing, including laying down with their legs above the level of the heart for 30 minutes at least twice a day.  I have also encouraged the patient to continue to sleep in a bed.     I have encouraged the patient to continue on their high protein diet to aid in wound healing.   The patient will return to the wound clinic on an as-needed basis if further wounds develop.        30 minutes spent on the date of the encounter doing chart review, history and exam, documentation and further activities per the note, this time excludes any procedure time          Silas Reece MD  02/15/2024   3:03 PM   [facility]  [phone]    This note was electronically signed by Silas Reece MD

## 2024-02-16 ENCOUNTER — THERAPY VISIT (OUTPATIENT)
Dept: PHYSICAL THERAPY | Facility: REHABILITATION | Age: 79
End: 2024-02-16
Payer: COMMERCIAL

## 2024-02-16 ENCOUNTER — MEDICAL CORRESPONDENCE (OUTPATIENT)
Dept: HEALTH INFORMATION MANAGEMENT | Facility: CLINIC | Age: 79
End: 2024-02-16

## 2024-02-16 DIAGNOSIS — I89.0 LYMPHEDEMA: ICD-10-CM

## 2024-02-16 DIAGNOSIS — I89.0 SECONDARY LYMPHEDEMA: Primary | ICD-10-CM

## 2024-02-16 PROCEDURE — 97140 MANUAL THERAPY 1/> REGIONS: CPT | Mod: GP | Performed by: PHYSICAL THERAPIST

## 2024-02-19 ENCOUNTER — THERAPY VISIT (OUTPATIENT)
Dept: PHYSICAL THERAPY | Facility: REHABILITATION | Age: 79
End: 2024-02-19
Payer: COMMERCIAL

## 2024-02-19 DIAGNOSIS — M62.81 GENERALIZED MUSCLE WEAKNESS: ICD-10-CM

## 2024-02-19 DIAGNOSIS — I89.0 SECONDARY LYMPHEDEMA: Primary | ICD-10-CM

## 2024-02-19 DIAGNOSIS — I89.0 LYMPHEDEMA: ICD-10-CM

## 2024-02-19 PROCEDURE — 97140 MANUAL THERAPY 1/> REGIONS: CPT | Mod: GP | Performed by: PHYSICAL THERAPY ASSISTANT

## 2024-02-21 ENCOUNTER — THERAPY VISIT (OUTPATIENT)
Dept: PHYSICAL THERAPY | Facility: REHABILITATION | Age: 79
End: 2024-02-21
Payer: COMMERCIAL

## 2024-02-21 DIAGNOSIS — I89.0 LYMPHEDEMA: ICD-10-CM

## 2024-02-21 DIAGNOSIS — I89.0 SECONDARY LYMPHEDEMA: Primary | ICD-10-CM

## 2024-02-21 PROCEDURE — 97140 MANUAL THERAPY 1/> REGIONS: CPT | Mod: GP | Performed by: PHYSICAL THERAPIST

## 2024-02-21 NOTE — PROGRESS NOTES
[facility]                                                                                   OUTPATIENT PHYSICAL THERAPY    PLAN OF TREATMENT FOR OUTPATIENT REHABILITATION   Patient's Last Name, First Name, Irene Dsouza YOB: 1945   Provider's Name   [facility]   Medical Record No.  [id number]     Onset Date: 03/01/23  Start of Care Date: 01/17/24     Medical Diagnosis:  Lymphedema (new order as of 2/13/24 from Dr. Reece)      PT Treatment Diagnosis:  Secondary lymphedema Plan of Treatment  Frequency/Duration: 3x/week/ 24 visits, up to 90 days (pt cannot get in to clinic for 3x/week therapy for 4 weeks)    Certification date from 02/21/24 to 05/20/24         See note for plan of treatment details and functional goals     Olga Saenz PT                         I CERTIFY THE NEED FOR THESE SERVICES FURNISHED UNDER        THIS PLAN OF TREATMENT AND WHILE UNDER MY CARE     (Physician attestation of this document indicates review and certification of the therapy plan).              Referring Provider:  Dr. Silas Reece    Initial Assessment  See Epic Evaluation- Start of Care Date: 01/17/24 02/21/24 0500   Appointment Info   Signing clinician's name / credentials Olga Saenz PT, CLT   Total/Authorized Visits 24   Visits Used 9   Medical Diagnosis Lymphedema  (new order as of 2/13/24 from Dr. Reece)   PT Tx Diagnosis Secondary lymphedema   Other pertinent information Pt has B LE Circaid velcro wraps (previous Mediven plus petite, 20-30 mmHg extra wide calf with top band III)   Quick Adds Certification   Self Referred   Self Referred (PT)   (PT order received 2/13/24)   Progress Note/Certification   Start of Care Date 01/17/24   Onset of illness/injury or Date of Surgery 03/01/23   Therapy Frequency 3x/week   Predicted Duration 24 visits, up to 90 days (pt cannot get in to clinic for 3x/week therapy for 4  weeks)   Certification date from 02/21/24   Certification date to 05/20/24   Progress Note Due Date   (visit 19)   Supervision   PT Assistant Visit Number 1   GOALS   PT Goals 2;3   PT Goal 1   Goal Identifier Home management   Goal Description Pt will be independent with home management of swelling including skin care/precautions, exercise, follow up with other providers (wound clinic, PCP, vascular, etc), compression bandaging and use of compression garments.   Goal Progress Goal progressing, pt's neighbor is helping with compression bandaging and wound cares at home   Target Date 05/20/24   PT Goal 2   Goal Identifier Volume   Goal Description Pt will demonstrate reduction in R LE volume of 23% and L LE volume of 7% to return to prior level of function.   Goal Progress Goal progressing, -7.6% in R LE and -7.1% in L LE   Target Date 05/20/24   PT Goal 3   Goal Identifier Balance   Goal Description Pt will demonstrate TUG in <13.5 sec with or without AD to indicate low falls risk   Goal Progress Goal not met, 22.4 sec as of 2/21/24   Target Date 05/20/24   Subjective Report   Subjective Report Pt still has wraps on from 2 days ago. Pt feels that her legs have improved with less wound drainage now. Still has a lot of itching and this is annoying. Pain has been better, about 4/10.   Objective Measures   Objective Measures Objective Measure 1;Objective Measure 2   Objective Measure 1   Objective Measure Skin/wounds   Details no weeping today noted, dry scabbing posterior R calf   Objective Measure 2   Objective Measure TUG   Details 22.4 sec   Treatment Interventions (PT)   Interventions Self Care/Home Management;Manual Therapy   Manual Therapy   Manual Therapy: Mobilization, MFR, MLD, friction massage minutes ([id number]) 60   Manual Therapy 1 - Details Writer removed wraps at start of session and remeasured B LEs and educated pt on progress. Decided to proceed with Velcro wraps on the left side going forward and educated  pt on this. MLD to R LE starting with inguinal node clearing. Aquaphor liberally on R leg and added cortisone ointment to R lower lateral leg for itching. Used 3 ABD pads on the R lower leg with non-stick bandage at posterior calf first, held with gauze roll. Compression bandaging as follows for R LE only: Stockinette, Rosidal foam from dorsum of foot to below knee, 1 8cm, 1 10 cm and 1 12 cm short-stretch wraps from toes to below knees. Donned Velcro wrap on L side. Donned pt s socks and shoes. Educated her on wearing schedule-  asked her to remove wraps prior to next session so that she can shower normally and allow skin of R leg to be exposed to the air.   Skilled Intervention MLD, skin care and compression bandaging to promote lymphatic flow and wound healing   Patient Response/Progress MLD felt good; tolerated wraps well and did not complain of itching   Plan   Home program Purchase new Darco shoes, bring wound supplies to clinic. Keep wraps on 2 days for R LE and use velcro wraps daily on L side. Lymph stim exercises daily (reissued as of 2/6/24)   Updates to plan of care Requesting pneumatic pump demo   Plan for next session Continue MLD to R>L leg, bandaging to R leg and add simple exercises for strengthening   Comments   Comments Pt has been seen for 9 visits from 1/17/24 to present with treatment focused on complete decongestive therapy. At this time, B LEs are down slightly over 7% on each side, but the R leg is still larger than it was at conclusion of therapy last year. The left leg wound has healed and we are transitioning to use of Velcro wraps only on the left leg. The right leg continues to fluctuate in terms of skin integrity. Today there are scabs in multiple areas, but the wound bandages had drainage on them and bandages were sticking to dry wound areas upon removal today. Will continue to work on decongestive therapy on the R side and would like to proceed with more strengthening and balance and  general mobility going forward. Updating plan of care and certification period.   Total Session Time   Timed Code Treatment Minutes 60   Total Treatment Time (sum of timed and untimed services) 60       Olga Saenz, PT, DPT, CLT  2/21/2024

## 2024-02-23 ENCOUNTER — THERAPY VISIT (OUTPATIENT)
Dept: PHYSICAL THERAPY | Facility: REHABILITATION | Age: 79
End: 2024-02-23
Payer: COMMERCIAL

## 2024-02-23 DIAGNOSIS — I89.0 LYMPHEDEMA: ICD-10-CM

## 2024-02-23 DIAGNOSIS — I89.0 SECONDARY LYMPHEDEMA: Primary | ICD-10-CM

## 2024-02-23 PROCEDURE — 97110 THERAPEUTIC EXERCISES: CPT | Mod: GP | Performed by: PHYSICAL THERAPIST

## 2024-02-23 PROCEDURE — 97140 MANUAL THERAPY 1/> REGIONS: CPT | Mod: GP | Performed by: PHYSICAL THERAPIST

## 2024-02-26 ENCOUNTER — THERAPY VISIT (OUTPATIENT)
Dept: PHYSICAL THERAPY | Facility: REHABILITATION | Age: 79
End: 2024-02-26
Payer: COMMERCIAL

## 2024-02-26 DIAGNOSIS — M62.81 GENERALIZED MUSCLE WEAKNESS: ICD-10-CM

## 2024-02-26 DIAGNOSIS — I89.0 SECONDARY LYMPHEDEMA: Primary | ICD-10-CM

## 2024-02-26 DIAGNOSIS — I89.0 LYMPHEDEMA: ICD-10-CM

## 2024-02-26 PROCEDURE — 97110 THERAPEUTIC EXERCISES: CPT | Mod: CQ | Performed by: PHYSICAL THERAPY ASSISTANT

## 2024-02-26 PROCEDURE — 97140 MANUAL THERAPY 1/> REGIONS: CPT | Mod: CQ | Performed by: PHYSICAL THERAPY ASSISTANT

## 2024-02-28 ENCOUNTER — THERAPY VISIT (OUTPATIENT)
Dept: PHYSICAL THERAPY | Facility: REHABILITATION | Age: 79
End: 2024-02-28
Payer: COMMERCIAL

## 2024-02-28 DIAGNOSIS — I89.0 SECONDARY LYMPHEDEMA: Primary | ICD-10-CM

## 2024-02-28 DIAGNOSIS — I89.0 LYMPHEDEMA: ICD-10-CM

## 2024-02-28 PROCEDURE — 97140 MANUAL THERAPY 1/> REGIONS: CPT | Mod: GP | Performed by: PHYSICAL THERAPIST

## 2024-03-01 ENCOUNTER — THERAPY VISIT (OUTPATIENT)
Dept: PHYSICAL THERAPY | Facility: REHABILITATION | Age: 79
End: 2024-03-01
Payer: COMMERCIAL

## 2024-03-01 DIAGNOSIS — I89.0 SECONDARY LYMPHEDEMA: Primary | ICD-10-CM

## 2024-03-01 DIAGNOSIS — I89.0 LYMPHEDEMA: ICD-10-CM

## 2024-03-01 PROCEDURE — 97140 MANUAL THERAPY 1/> REGIONS: CPT | Mod: GP | Performed by: PHYSICAL THERAPIST

## 2024-03-04 ENCOUNTER — THERAPY VISIT (OUTPATIENT)
Dept: PHYSICAL THERAPY | Facility: REHABILITATION | Age: 79
End: 2024-03-04
Payer: COMMERCIAL

## 2024-03-04 DIAGNOSIS — I89.0 LYMPHEDEMA: ICD-10-CM

## 2024-03-04 DIAGNOSIS — I89.0 SECONDARY LYMPHEDEMA: Primary | ICD-10-CM

## 2024-03-04 PROCEDURE — 97140 MANUAL THERAPY 1/> REGIONS: CPT | Mod: GP | Performed by: PHYSICAL THERAPIST

## 2024-03-04 PROCEDURE — 97110 THERAPEUTIC EXERCISES: CPT | Mod: GP | Performed by: PHYSICAL THERAPIST

## 2024-03-06 ENCOUNTER — THERAPY VISIT (OUTPATIENT)
Dept: PHYSICAL THERAPY | Facility: REHABILITATION | Age: 79
End: 2024-03-06
Payer: COMMERCIAL

## 2024-03-06 DIAGNOSIS — I89.0 LYMPHEDEMA: ICD-10-CM

## 2024-03-06 DIAGNOSIS — I89.0 SECONDARY LYMPHEDEMA: Primary | ICD-10-CM

## 2024-03-06 PROCEDURE — 97535 SELF CARE MNGMENT TRAINING: CPT | Mod: GP | Performed by: PHYSICAL THERAPIST

## 2024-03-06 PROCEDURE — 97140 MANUAL THERAPY 1/> REGIONS: CPT | Mod: GP | Performed by: PHYSICAL THERAPIST

## 2024-03-06 NOTE — PATIENT INSTRUCTIONS
"Wound Care Instructions    3 TIMES PER WEEK and as needed, Cleanse your left leg wound(s) with Normal saline.    Pat Dry with non-sterile gauze    Apply Lotion to the intact skin surrounding your wound and other dry skin locations. Some good lotions include: Remedy Skin Repair Cream, Sarna, Vanicream or Cetaphil    Primary Dressing: Apply Aquacel AG into/onto the wounds    Secondary dressing: Cover with ABD pad    Secure with non-sterile roll gauze (4\" x 75\" roll) and tape (1\" roll tape) as needed; avoid adhesive directly on the skin    Compression: lymphedema wraps    It is ok to get your wound wet in the bath or shower    It is recommended that you elevate your legs throughout the day: approx 2-3 times each day  Elevate them above the level of your heart for 30 min.   Ways to do this:   Lay on the couch or your bed and prop your legs up on pillows   Recline back as far as you can go in your recliner and prop your legs on pillows.     Doing these things will help reduce the edema in your legs.    High Protein Foods  When you have an open ulcer, your bodies protein needs are much higher, so it is recommended eat good sources of protein or take a protein supplement!    Protein Supplements  -Premier Protein  -Ensure  -Boost  -Glucerna, if diabetic    Chicken  -Chicken breast, 3.5oz.-30 grams protein  -Chicken meat, cooked, 4 oz.    Beef  -Hamburger tiana, 4 oz-28 grams protein  -Steak, 6 oz-42 grams  -Most cuts of beef- 7 grams of protein per ounce    Fish  -Most fish fillets or steaks are about 22 grams of protein for 3 1/2 oz(100 grams) of cooked fish, or 6 grams per ounce  -Tuna, 6 oz can-40 grams of protein    Pork  -Pork chop, average-22 grams protein  -Pork loin or tenderloin, 4 oz.-29 grams  -Ham, 3oz serving- 19 grams  -Mata, 1 slice-3 grams    Eggs and Dairy  -Egg, large-7 grams  -Milk, 1 cup-8 grams  -Cottage cheese, 1/2 cup-15 grams  -Greek yogurt, 1 cup-usually 8-12 grams, check label    Beans  -Soy milk, " 1 cup-6-10 grams  -Most beans(black, mcnair, lentils, etc.) about 7-10 grams protein per half cup of cooked beans    Nuts and Seeds  -Peanut butter, 2 Tablespoons- 8 grams protein  -Almonds, 1/4 cup- 8 grams  -Peanuts, 1/4 cup-9 grams  -Sunflower seeds, 1/4 cup- 6 grams        If for some reason you are not able to get your dressing(s) changed as outlined above (due to illness, lack of supplies, lack of help) please do the following: remove old, soiled dressings; wash the wounds with saline; pat dry; apply ABD pad or other absorbant pad and secure with rolled gauze; avoid tape directly on your skin; Call the clinic as soon as possible to let us know what the current issues are in receiving wound care [phone].      SEEK MEDICAL CARE IF:  You have an increase in swelling, pain, or redness around the wound.  You have an increase in the amount of pus coming from the wound.  There is a bad smell coming from the wound.  The wound appears to be worsening/enlarging  You have a fever greater than 101.5 F      It is ok to continue current wound care treatment/products for the next 2-3 days until new wound care supplies are ordered and arrive. If longer than this please contact our office at [phone].    If you have a 2 layer or 4 layer compression wrap on these are safe to have on for ONLY 7 days. If for some reason you are not able to get the wrap(s) changed (due to illness; lack of supplies, lack of help, lack of transportation) please do the following: unwrap the old 2 or 4 layer compression wrap; avoid using scissors as you could cut your skin and cause wounds; use tubular compression when available. Call to reschedule your home care or clinic visit appointment as soon as possible.    Please NOTE: if you are 15 minutes late to your clinic appointment you will have to be rescheduled. Please call our clinic as soon as possible if you know you will not be able to get to your appointment at [phone].    If you  fail to show up to 3 scheduled clinic appointments you will be dismissed from our clinic.              We want to hear from you!  In the next few weeks, you should receive a call or email to complete a survey about your visit at [facility]. Please help us improve your appointment experience by letting us know how we did today. We strive to make your experience good and value any ways in which we could do better.      We value your input and suggestions.    Thank you for choosing the [facility]!

## 2024-03-07 ENCOUNTER — OFFICE VISIT (OUTPATIENT)
Dept: VASCULAR SURGERY | Facility: CLINIC | Age: 79
End: 2024-03-07
Attending: FAMILY MEDICINE
Payer: COMMERCIAL

## 2024-03-07 VITALS — HEART RATE: 79 BPM | OXYGEN SATURATION: 93 % | DIASTOLIC BLOOD PRESSURE: 77 MMHG | SYSTOLIC BLOOD PRESSURE: 173 MMHG

## 2024-03-07 DIAGNOSIS — S81.802D WOUND OF LEFT LOWER EXTREMITY, SUBSEQUENT ENCOUNTER: Primary | ICD-10-CM

## 2024-03-07 PROBLEM — S81.802A LEG WOUND, LEFT: Status: ACTIVE | Noted: 2023-09-14

## 2024-03-07 PROCEDURE — G0463 HOSPITAL OUTPT CLINIC VISIT: HCPCS | Performed by: FAMILY MEDICINE

## 2024-03-07 PROCEDURE — 99214 OFFICE O/P EST MOD 30 MIN: CPT | Performed by: FAMILY MEDICINE

## 2024-03-07 ASSESSMENT — PAIN SCALES - GENERAL: PAINLEVEL: MODERATE PAIN (5)

## 2024-03-07 NOTE — PROGRESS NOTES
Wound Clinic Note         Visit date: 03/07/2024       Cheif Complaint:     Irene Pena is a 78 year old  female had concerns including right leg wound .  The patient has lower extremity edema and left leg ulcers         HISTORY OF PRESENT ILLNESS:    Irene Pena reports the left leg wound has been present since late February 2024 the wound began without a clear cause.  She previously saw Dr. Gottlieb here in the clinic to discuss treatment options for her venous insufficiency on April 26, 2023.  She also had a venous insufficiency performed that same day.  This did show some areas of superficial venous insufficiency.  Dr. Gottlieb offered her treatment for this superficial venous insufficiency however the patient declined at that time.  However later she continued to have difficulties with her legs and her primary care doctor referred her to [facility] where she ended up having a vein procedure on both legs performed.    I last saw this patient in the wound clinic on February 15, 2024 at which time her right leg wounds were all healed.  Since then she is continue to have her lymphedema wraps changed 3 times a week at the lymphedema clinic.  Just at her last dressing change, new wounds were noted on her left anterior shin.      The pateint denies fevers or chills.  They report the pain from the wound has been 0/10 and has remained about the same recently.      The patient reports propping up their legs occasionally during the day, but they do not elevate their legs above the level of their heart.  The patient confirms they do sleep in a bed.     Today the patient reports maintaining a high protein diet, but has not been taking protein supplements lately.        The patient denies a history of diabetes, smoking or chronic steroid use.         The patient has not had any symptoms of infection relating to the wound recently and is not currently on antibiotics.       Problem List:    No past medical history on file.          Family Hx: family history includes No Known Problems in her daughter, father, maternal aunt, maternal grandfather, maternal grandmother, mother, paternal aunt, paternal grandfather, paternal grandmother, and sister.       Surgical Hx:   Past Surgical History:   Procedure Laterality Date    CHOLECYSTECTOMY      EYE SURGERY      STRIP VEIN Left 2006    rfa          Allergies:    Allergies   Allergen Reactions    Amoxicillin Nausea and Vomiting    Lisinopril Cough    Latex Rash              Medication History:    Current Outpatient Medications   Medication Sig    acetaZOLAMIDE (DIAMOX) 250 MG tablet [ACETAZOLAMIDE (DIAMOX) 250 MG TABLET]  (Patient not taking: Reported on 1/25/2024)    aspirin 81 MG EC tablet [ASPIRIN 81 MG EC TABLET] Take 81 mg by mouth daily.    azithromycin (ZITHROMAX) 500 MG tablet  (Patient not taking: Reported on 10/5/2023)    famotidine (PEPCID) 40 MG tablet     FOLIC ACID/MULTIVIT-MIN/LUTEIN (CENTRUM SILVER ORAL) [FOLIC ACID/MULTIVIT-MIN/LUTEIN (CENTRUM SILVER ORAL)] Take by mouth. (Patient not taking: Reported on 1/25/2024)    losartan (COZAAR) 100 MG tablet [LOSARTAN (COZAAR) 100 MG TABLET]     naproxen sodium (ALEVE) 220 MG tablet [NAPROXEN SODIUM (ALEVE) 220 MG TABLET] Take 220 mg by mouth every 12 (twelve) hours as needed for pain. (Patient not taking: Reported on 1/25/2024)    NYAMYC 799948 UNIT/GM external powder     omeprazole (PRILOSEC) 20 MG capsule [OMEPRAZOLE (PRILOSEC) 20 MG CAPSULE]  (Patient not taking: Reported on 1/25/2024)    ranitidine (ZANTAC) 300 MG tablet [RANITIDINE (ZANTAC) 300 MG TABLET]     simvastatin (ZOCOR) 40 MG tablet [SIMVASTATIN (ZOCOR) 40 MG TABLET]     SYNTHROID 112 mcg tablet [SYNTHROID 112 MCG TABLET]     triamcinolone (KENALOG) 0.025 % cream Apply topically 2 times daily    VANIQA 13.9 % cream [VANIQA 13.9 % CREAM]      No current facility-administered medications for this visit.         Tobacco History:  reports  that she has never smoked. She has never used smokeless tobacco.       REVIEW OF SYMPTOMS:   The review of systems was negative except as noted in the HPI.           PHYSICAL EXAMINATION:     BP (!) 173/77   Pulse 79   SpO2 93%            GENERAL: The patient overall appears well and is no acute distress.   HEAD: normocephalic   EYES: Sclera and conjunctiva clear   NECK: no obvious masses   LUNGS: breathing is unlabored.   EXTREMITIES: No clubbing, cyanosis or edema   SKIN: No rashes or other abnormalities except as noted under the Wound section below.   NEUROLOGICAL: normal motor and sensory function   EDEMA: Moderate       WOUND: She has 2 very superficial open areas on the left anterior shin which could be consistent with abrasions.  There are no signs of infection, no areas of periwound maceration and no necrotic material.      Also see below for wound details:       Circumferential volume measures:             No data to display                Ulceration(s)/Wound(s):   Please see the media tab under the chart review for pictures of the wounds.  Nursing staff removed dressings and cleansed wound.    VASC Wound Rt lateral leg (Active)   Description scab 03/07/24 1500       VASC Wound left superior (Active)   Pre Size Length 3 03/07/24 1500   Pre Size Width 1 03/07/24 1500   Pre Size Depth 0.1 03/07/24 1500   Pre Total Sq cm 3 03/07/24 1500       VASC Wound left inferior (Active)   Pre Size Length 0.8 03/07/24 1500   Pre Size Width 1 03/07/24 1500   Pre Size Depth 0.1 03/07/24 1500   Pre Total Sq cm 0.8 03/07/24 1500                     No results for input(s): HGBA1C, A1C, EAG in the last 91897 hours.    Invalid input(s): [id number]       No results for input(s): ALBUMIN in the last 87745 hours.           No debridement performed today.                  ASSESSMENT:   This is a 78 year old  female with a healed right leg ulcer, the patient also has lower extremity edema which was also managed during today's  clinic visit.          PLAN:   We will bandage the 2 open areas on the left leg with Aquacel, ABD pad and the lymphedema wraps changed 3 times a week.      Separate from the wound care instructions we then discussed management strategies for lower extremity edema.  I explained the keys for managing lower extremity edema are compression and elevation.  I have again explained to the patient today that controlling the edema is probably the most important thing we can do to help heal the wound.  I have specifically recommended that they lay down with their legs above the level of the heart for 30 minutes at least twice a day.  I emphasized that if we can not control the edema we will likely not be able to get this wound to heal.  I have also encouraged the patient to continue to sleep in a bed.     I have encouraged the patient to continue on their high protein diet to aid in wound healing.   The patient will return to the wound clinic in 3 to 4 weeks.        30 minutes spent on the date of the encounter doing chart review, history and exam, documentation and further activities per the note, this time excludes any procedure time          Silas Reece MD  03/07/2024   4:08 PM   [facility]  [phone]    This note was electronically signed by Silas Reece MD

## 2024-03-08 ENCOUNTER — THERAPY VISIT (OUTPATIENT)
Dept: PHYSICAL THERAPY | Facility: REHABILITATION | Age: 79
End: 2024-03-08
Payer: COMMERCIAL

## 2024-03-08 DIAGNOSIS — I89.0 LYMPHEDEMA: ICD-10-CM

## 2024-03-08 DIAGNOSIS — I89.0 SECONDARY LYMPHEDEMA: Primary | ICD-10-CM

## 2024-03-08 PROCEDURE — 97140 MANUAL THERAPY 1/> REGIONS: CPT | Mod: GP | Performed by: PHYSICAL THERAPIST

## 2024-03-11 ENCOUNTER — THERAPY VISIT (OUTPATIENT)
Dept: PHYSICAL THERAPY | Facility: REHABILITATION | Age: 79
End: 2024-03-11
Payer: COMMERCIAL

## 2024-03-11 DIAGNOSIS — I89.0 LYMPHEDEMA: ICD-10-CM

## 2024-03-11 DIAGNOSIS — I89.0 SECONDARY LYMPHEDEMA: Primary | ICD-10-CM

## 2024-03-11 PROCEDURE — 97140 MANUAL THERAPY 1/> REGIONS: CPT | Mod: GP | Performed by: PHYSICAL THERAPIST

## 2024-03-13 ENCOUNTER — THERAPY VISIT (OUTPATIENT)
Dept: PHYSICAL THERAPY | Facility: REHABILITATION | Age: 79
End: 2024-03-13
Payer: COMMERCIAL

## 2024-03-13 DIAGNOSIS — I89.0 SECONDARY LYMPHEDEMA: Primary | ICD-10-CM

## 2024-03-13 DIAGNOSIS — I89.0 LYMPHEDEMA: ICD-10-CM

## 2024-03-13 PROCEDURE — 97140 MANUAL THERAPY 1/> REGIONS: CPT | Mod: GP | Performed by: PHYSICAL THERAPIST

## 2024-03-15 ENCOUNTER — THERAPY VISIT (OUTPATIENT)
Dept: PHYSICAL THERAPY | Facility: REHABILITATION | Age: 79
End: 2024-03-15
Payer: COMMERCIAL

## 2024-03-15 DIAGNOSIS — I89.0 LYMPHEDEMA: ICD-10-CM

## 2024-03-15 DIAGNOSIS — I89.0 SECONDARY LYMPHEDEMA: Primary | ICD-10-CM

## 2024-03-15 PROCEDURE — 97140 MANUAL THERAPY 1/> REGIONS: CPT | Mod: GP | Performed by: PHYSICAL THERAPIST

## 2024-03-16 NOTE — PROGRESS NOTES
PLAN  Continue therapy per current plan of care.    Beginning/End Dates of Progress Note Reporting Period:  02/21/24 to 03/15/2024    Referring Provider:  Dr. Silas Reece          03/15/24 0500   Appointment Info   Signing clinician's name / credentials Olga Saenz, PT, CLT   Total/Authorized Visits 40   Visits Used 19   Medical Diagnosis Lymphedema  (new order as of 2/13/24 from Dr. Reece)   PT Tx Diagnosis Secondary lymphedema   Other pertinent information Pt has B LE Circaid velcro wraps (previous Mediven plus petite, 20-30 mmHg extra wide calf with top band III)   Quick Adds Certification   Self Referred   Self Referred (PT)   (PT order received 2/13/24)   Progress Note/Certification   Start of Care Date 01/17/24   Onset of illness/injury or Date of Surgery 03/01/23   Therapy Frequency 3x/week   Predicted Duration up to 29 visits, 3 days/week to 5/20/24   Certification date from 02/21/24   Certification date to 05/20/24   Progress Note Due Date   (visit 29)   Progress Note Completed Date 02/21/24   Supervision   PT Assistant Visit Number 2   GOALS   PT Goals 2;3   PT Goal 1   Goal Identifier Home management   Goal Description Pt will be independent with home management of swelling including skin care/precautions, exercise, follow up with other providers (wound clinic, PCP, vascular, etc), compression bandaging and use of compression garments.   Goal Progress Goal progressing, pt's neighbor is helping with compression bandaging and wound cares at home, pt is unable to do this well on her own and family has not been helpful. Pt has seen wound clinic again and will see her PCP soon.   Target Date 05/20/24   PT Goal 2   Goal Identifier Volume   Goal Description Pt will demonstrate reduction in R LE volume of 23% and L LE volume of 7% to return to prior level of function.   Goal Progress Goal progressing, -7.6% in R LE and -7.1% in L LE as of 2/21/24   Target Date 05/20/24   PT Goal 3   Goal Identifier  Balance   Goal Description Pt will demonstrate TUG in <13.5 sec with or without AD to indicate low falls risk   Goal Progress Goal not met, 22.4 sec as of 2/21/24   Target Date 05/20/24   Subjective Report   Subjective Report Pt still has wraps on from Wednesday - pain is 5-6/10 and right leg is itching.   Objective Measures   Objective Measures Objective Measure 1;Objective Measure 2   Objective Measure 1   Objective Measure Skin/wounds   Details Lymphorrhea B LE, hyperpigementation. Anterior R lower leg, 1 small openings with no drainage, but red/raw skin at ankle and dry flaky skin posterior ankle; L anterior shin 2 wounds 1.5 x 1 cm and .5x.5 cm with scant drainage. Pink and dark pink skin B lower legs.   Objective Measure 2   Objective Measure Strength   Details STS slow pace with UE support; needs min A for supine>sit   Treatment Interventions (PT)   Interventions Self Care/Home Management;Manual Therapy;Therapeutic Procedure/Exercise   Manual Therapy   Manual Therapy: Mobilization, MFR, MLD, friction massage minutes ([id number]) 57   Manual Therapy 1 - Details Writer removed wraps at start of session, washed B LEs with soap/water and rerolled wraps to use again today. Brief MLD to B LEs in supine avoiding wound areas. Applied Desitin at periwound areas on B LEs, Aquaphor on the rest of the lower leg and Cortisone cream liberally to R lower leg (lateral and posterior) for itching. Covered wound beds with Aquacel bandage (2 pieces on each anterior/distal shin), covered with ABD pad (1 on each leg) held with gauze roll. Compression bandaging as follows for B LEs: stockinette, Rosidal foam at dorsum of foot and spiraled ankle to knee, 1 8cm, 1 10cm and 1 12 cm short-stretch wraps from toes to below knee. Writer donned both socks and Velcro shoes.   Skilled Intervention skin care and compression bandaging to promote lymphatic flow and wound healing   Patient Response/Progress Drier wound beds today   Plan   Home  program Purchase new [employer] shoes, bring wound supplies to clinic. Keep wraps on 2 days. Lymph stim exercises daily (reissued as of 2/6/24); strengthening: roll outs L5, seated leg press L5, STS   Updates to plan of care Elmer merchant was on 3/6   Plan for next session Continue MLD to B LEs, wound cares and bandaging to both legs. (Desitin periwound area, Aquaphor/Eucerin, Aquacel bandage on wound beds, ABD pads and non-stick bandage held with kerlix); assess 3 strengthening exercises when able and consider adding LAQ, hamstring curl with band, side stepping   Comments   Comments Pt has been seen for 10 visits from 2/21-3/15/24 with treatment focused on decongestive therapy for B LE lymphedema and wounds. Unfortunately, the left leg developed new wounds after initial wound was healed and the majority of the appointment time is focused on skin care, wound cares and compression bandaging with very little time to devote to strength/balance and mobility yet. Pt has difficulty with memory and it is unclear if she is working on HEP at home. She requires assistance from her neighbor to redress her wounds at home as she is unable to do so herself and doesn t have regular help from her family. At this time wounds in both legs persist, though they have improved over the course of this week slightly. Next weep pt does not have any PT appointments, so will rely on her neighbor for help. Due to the fluctuating nature of her wounds in her legs, expanding plan of care to 40 visits total. She remains appropriate for skilled PT per updated plan of care.   Total Session Time   Timed Code Treatment Minutes 57   Total Treatment Time (sum of timed and untimed services) 57     Olga Saenz, PT, DPT, CLT  3/15/2024

## 2024-03-25 ENCOUNTER — TELEPHONE (OUTPATIENT)
Dept: VASCULAR SURGERY | Facility: CLINIC | Age: 79
End: 2024-03-25

## 2024-03-25 NOTE — TELEPHONE ENCOUNTER
Caller: Patient    Provider: MD Silas Reece    Detailed reason for call: Patient would like some more information about lymphedema pump and the process of obtaining one, and/or she is wondering if she can be sent a brochure that include more information.    Best phone number to contact: [phone]    Best time to contact: Tomorrow before 10AM or after 3:30PM    Ok to leave a detailed message: Yes    Ok to speak to authorized person if needed: No      (Noted to patient if reason is related to wound or incision, to please send a photo via email or [employer].)

## 2024-03-26 ENCOUNTER — THERAPY VISIT (OUTPATIENT)
Dept: PHYSICAL THERAPY | Facility: REHABILITATION | Age: 79
End: 2024-03-26
Payer: COMMERCIAL

## 2024-03-26 DIAGNOSIS — I89.0 LYMPHEDEMA: ICD-10-CM

## 2024-03-26 DIAGNOSIS — M62.81 GENERALIZED MUSCLE WEAKNESS: ICD-10-CM

## 2024-03-26 DIAGNOSIS — I89.0 SECONDARY LYMPHEDEMA: Primary | ICD-10-CM

## 2024-03-26 PROCEDURE — 97140 MANUAL THERAPY 1/> REGIONS: CPT | Mod: GP | Performed by: PHYSICAL THERAPIST

## 2024-03-26 NOTE — TELEPHONE ENCOUNTER
Writer left a message with pt, informed her it would be best to discuss this at your appointment next week if she would be a candidate for a pump. Informed her that Dr. Reece needs to add specific information in her visit note for her to qualify. Will send message to Dr. Reece. Told to call back with any questions.

## 2024-03-27 NOTE — PATIENT INSTRUCTIONS
"Call [facility] when you get home to determine where you are in the process of getting lymphedema pumps.     Call the clinic after you talk to fozia, so we can know if we need to start an order for you or if [employer] needs any other documentation    Wound Care Instructions    3 TIMES PER WEEK and as needed, Cleanse your right leg wound(s) with Normal saline.    Pat Dry with non-sterile gauze    Apply zinc oxide to skin    Primary Dressing: Apply ABD    Secure with non-sterile roll gauze (4\" x 75\" roll) and tape (1\" roll tape) as needed; avoid adhesive directly on the skin    Compression: lymphedema wraps or velcros     It is ok to get your wound wet in the bath or shower    It is recommended that you elevate your legs throughout the day: approx 2-3 times each day  Elevate them above the level of your heart for 30 min.   Ways to do this:   Lay on the couch or your bed and prop your legs up on pillows   Recline back as far as you can go in your recliner and prop your legs on pillows.     Doing these things will help reduce the edema in your legs.      If for some reason you are not able to get your dressing(s) changed as outlined above (due to illness, lack of supplies, lack of help) please do the following: remove old, soiled dressings; wash the wounds with saline; pat dry; apply ABD pad or other absorbant pad and secure with rolled gauze; avoid tape directly on your skin; Call the clinic as soon as possible to let us know what the current issues are in receiving wound care [phone].      SEEK MEDICAL CARE IF:  You have an increase in swelling, pain, or redness around the wound.  You have an increase in the amount of pus coming from the wound.  There is a bad smell coming from the wound.  The wound appears to be worsening/enlarging  You have a fever greater than 101.5 F      It is ok to continue current wound care treatment/products for the next 2-3 days until new wound care supplies are ordered and arrive. If " longer than this please contact our office at [phone].    If you have a 2 layer or 4 layer compression wrap on these are safe to have on for ONLY 7 days. If for some reason you are not able to get the wrap(s) changed (due to illness; lack of supplies, lack of help, lack of transportation) please do the following: unwrap the old 2 or 4 layer compression wrap; avoid using scissors as you could cut your skin and cause wounds; use tubular compression when available. Call to reschedule your home care or clinic visit appointment as soon as possible.    Please NOTE: if you are 15 minutes late to your clinic appointment you will have to be rescheduled. Please call our clinic as soon as possible if you know you will not be able to get to your appointment at [phone].    If you fail to show up to 3 scheduled clinic appointments you will be dismissed from our clinic.              We want to hear from you!  In the next few weeks, you should receive a call or email to complete a survey about your visit at [facility]. Please help us improve your appointment experience by letting us know how we did today. We strive to make your experience good and value any ways in which we could do better.      We value your input and suggestions.    Thank you for choosing the [facility]!

## 2024-03-28 ENCOUNTER — THERAPY VISIT (OUTPATIENT)
Dept: PHYSICAL THERAPY | Facility: REHABILITATION | Age: 79
End: 2024-03-28
Payer: COMMERCIAL

## 2024-03-28 DIAGNOSIS — I89.0 LYMPHEDEMA: ICD-10-CM

## 2024-03-28 DIAGNOSIS — I89.0 SECONDARY LYMPHEDEMA: Primary | ICD-10-CM

## 2024-03-28 DIAGNOSIS — M62.81 GENERALIZED MUSCLE WEAKNESS: ICD-10-CM

## 2024-03-28 PROCEDURE — 97140 MANUAL THERAPY 1/> REGIONS: CPT | Mod: GP | Performed by: PHYSICAL THERAPIST

## 2024-04-01 ENCOUNTER — THERAPY VISIT (OUTPATIENT)
Dept: PHYSICAL THERAPY | Facility: REHABILITATION | Age: 79
End: 2024-04-01
Payer: COMMERCIAL

## 2024-04-01 DIAGNOSIS — I89.0 SECONDARY LYMPHEDEMA: Primary | ICD-10-CM

## 2024-04-01 DIAGNOSIS — I89.0 LYMPHEDEMA: ICD-10-CM

## 2024-04-01 PROCEDURE — 97140 MANUAL THERAPY 1/> REGIONS: CPT | Mod: GP | Performed by: PHYSICAL THERAPIST

## 2024-04-03 ENCOUNTER — THERAPY VISIT (OUTPATIENT)
Dept: PHYSICAL THERAPY | Facility: REHABILITATION | Age: 79
End: 2024-04-03
Payer: COMMERCIAL

## 2024-04-03 DIAGNOSIS — I89.0 LYMPHEDEMA: ICD-10-CM

## 2024-04-03 DIAGNOSIS — I89.0 SECONDARY LYMPHEDEMA: Primary | ICD-10-CM

## 2024-04-03 PROCEDURE — 97140 MANUAL THERAPY 1/> REGIONS: CPT | Mod: GP | Performed by: PHYSICAL THERAPIST

## 2024-04-04 ENCOUNTER — OFFICE VISIT (OUTPATIENT)
Dept: VASCULAR SURGERY | Facility: CLINIC | Age: 79
End: 2024-04-04
Attending: FAMILY MEDICINE
Payer: COMMERCIAL

## 2024-04-04 VITALS — OXYGEN SATURATION: 95 % | HEART RATE: 64 BPM | DIASTOLIC BLOOD PRESSURE: 74 MMHG | SYSTOLIC BLOOD PRESSURE: 190 MMHG

## 2024-04-04 DIAGNOSIS — S81.802D WOUND OF LEFT LOWER EXTREMITY, SUBSEQUENT ENCOUNTER: Primary | ICD-10-CM

## 2024-04-04 DIAGNOSIS — I89.0 LYMPHEDEMA: ICD-10-CM

## 2024-04-04 PROCEDURE — 99214 OFFICE O/P EST MOD 30 MIN: CPT | Performed by: FAMILY MEDICINE

## 2024-04-04 PROCEDURE — G0463 HOSPITAL OUTPT CLINIC VISIT: HCPCS | Performed by: FAMILY MEDICINE

## 2024-04-04 ASSESSMENT — PAIN SCALES - GENERAL: PAINLEVEL: MODERATE PAIN (4)

## 2024-04-04 NOTE — PROGRESS NOTES
Wound Clinic Note         Visit date: 04/04/2024       Cheif Complaint:     Ireen Pena is a 78 year old  female had concerns including Wound Check.  The patient has lower extremity edema and a right leg ulcer         HISTORY OF PRESENT ILLNESS:    Irene Pena reports the right leg wound has been present since late February 2024 the wound began without a clear cause.  She previously saw Dr. Gottlieb here in the clinic to discuss treatment options for her venous insufficiency on April 26, 2023.  She also had a venous insufficiency performed that same day.  This did show some areas of superficial venous insufficiency.  Dr. Gottlieb offered her treatment for this superficial venous insufficiency however the patient declined at that time.  However later she continued to have difficulties with her legs and her primary care doctor referred her to vein [facility] where she ended up having a vein procedure on both legs performed.    Since her last clinic visit all the wounds on the left leg have healed and she has not been applying any bandages to this area.  She is continue to wear her Velcro compression garment on the left leg to control her swelling.  She is continue to bandage the right lower extremity with alginate, an ABD pad and her lymphedema wraps changed 3 times a week by the home health nurses and the lymphedema physical therapist that comes out to see her.  She reports are still been light to moderate serous drainage from the right leg wound.  She also notes that she has been working with her lymphedema physical therapist to order a pneumatic lymphedema pump.        The pateint denies fevers or chills.  They report the pain from the wound has been 0/10 and has remained about the same recently.      The patient reports propping up their legs occasionally during the day, but they do not elevate their legs above the level of their heart.  The patient confirms they do sleep in a bed.      Today the patient reports maintaining a high protein diet, but has not been taking protein supplements lately.        The patient denies a history of diabetes, smoking or chronic steroid use.         The patient has not had any symptoms of infection relating to the wound recently and is not currently on antibiotics.       Problem List:   No past medical history on file.          Family Hx: family history includes No Known Problems in her daughter, father, maternal aunt, maternal grandfather, maternal grandmother, mother, paternal aunt, paternal grandfather, paternal grandmother, and sister.       Surgical Hx:   Past Surgical History:   Procedure Laterality Date    CHOLECYSTECTOMY      EYE SURGERY      STRIP VEIN Left 2006    rfa          Allergies:    Allergies   Allergen Reactions    Amoxicillin Nausea and Vomiting    Lisinopril Cough    Latex Rash              Medication History:    Current Outpatient Medications   Medication Sig Dispense Refill    aspirin 81 MG EC tablet [ASPIRIN 81 MG EC TABLET] Take 81 mg by mouth daily.      famotidine (PEPCID) 40 MG tablet       losartan (COZAAR) 100 MG tablet [LOSARTAN (COZAAR) 100 MG TABLET]       NYAMYC 527085 UNIT/GM external powder       ranitidine (ZANTAC) 300 MG tablet [RANITIDINE (ZANTAC) 300 MG TABLET]       simvastatin (ZOCOR) 40 MG tablet [SIMVASTATIN (ZOCOR) 40 MG TABLET]       SYNTHROID 112 mcg tablet [SYNTHROID 112 MCG TABLET]       triamcinolone (KENALOG) 0.025 % cream Apply topically 2 times daily 15 g 3    VANIQA 13.9 % cream [VANIQA 13.9 % CREAM]       acetaZOLAMIDE (DIAMOX) 250 MG tablet [ACETAZOLAMIDE (DIAMOX) 250 MG TABLET]  (Patient not taking: Reported on 1/25/2024)      azithromycin (ZITHROMAX) 500 MG tablet  (Patient not taking: Reported on 10/5/2023)      FOLIC ACID/MULTIVIT-MIN/LUTEIN (CENTRUM SILVER ORAL) [FOLIC ACID/MULTIVIT-MIN/LUTEIN (CENTRUM SILVER ORAL)] Take by mouth. (Patient not taking: Reported on 1/25/2024)      naproxen sodium  (ALEVE) 220 MG tablet [NAPROXEN SODIUM (ALEVE) 220 MG TABLET] Take 220 mg by mouth every 12 (twelve) hours as needed for pain. (Patient not taking: Reported on 1/25/2024)      omeprazole (PRILOSEC) 20 MG capsule [OMEPRAZOLE (PRILOSEC) 20 MG CAPSULE]  (Patient not taking: Reported on 1/25/2024)       No current facility-administered medications for this visit.         Tobacco History:  reports that she has never smoked. She has never used smokeless tobacco.       REVIEW OF SYMPTOMS:   The review of systems was negative except as noted in the HPI.           PHYSICAL EXAMINATION:     BP (!) 190/74   Pulse 64   SpO2 95%            GENERAL: The patient overall appears well and is no acute distress.   HEAD: normocephalic   EYES: Sclera and conjunctiva clear   NECK: no obvious masses   LUNGS: breathing is unlabored.   EXTREMITIES: No clubbing, cyanosis or edema   SKIN: No rashes or other abnormalities except as noted under the Wound section below.   NEUROLOGICAL: normal motor and sensory function   EDEMA: Moderate       WOUND: Today on her right leg there is no distinct open areas remaining, there is some edema fluid percolating through the skin into the bandages.  There is no sign of infection, no periwound maceration and no necrotic material.  All of the left leg wounds have healed.      Also see below for wound details:       Circumferential volume measures:            4/4/2024    11:00 AM   Circumferential Measures   Right just above MTP 21.5   Right Ankle 26   Right Widest Calf 39.5   Right Knee to Ankle 34   Left - just above MTP 24   Left Ankle 28   Left Widest Calf 41.5   Left Knee to Ankle 34       Ulceration(s)/Wound(s):   Please see the media tab under the chart review for pictures of the wounds.  Nursing staff removed dressings and cleansed wound.                         No results for input(s): HGBA1C, A1C, EAG in the last 87283 hours.    Invalid input(s): GQHMVYVED0X       No results for input(s): ALBUMIN  in the last 33702 hours.           No debridement performed today.                  ASSESSMENT:   This is a 78 year old  female with a right leg ulcer, the patient also has lower extremity edema which was also managed during today's clinic visit.          PLAN:   We will bandage the right leg with zinc oxide barrier cream, ABD pad and either the lymphedema wraps or her Velcro compression garment changed 3 times a week.  She will continue to work with the physical therapist to get the pneumatic lymphedema pump as soon as possible.      Separate from the wound care instructions we then discussed management strategies for lower extremity edema.  I explained the keys for managing lower extremity edema are compression and elevation.  I have encouraged the patient to continue to elevate the legs as they have been doing, including laying down with their legs above the level of the heart for 30 minutes at least twice a day.  I have also encouraged the patient to continue to sleep in a bed.     I have encouraged the patient to continue on their high protein diet to aid in wound healing.   The patient will return to the wound clinic in 3 to 4 weeks.        30 minutes spent on the date of the encounter doing chart review, history and exam, documentation and further activities per the note, this time excludes any procedure time          Silas Reece MD  04/04/2024   12:05 PM   [facility]  [phone]    This note was electronically signed by Silas Reece MD

## 2024-04-05 ENCOUNTER — THERAPY VISIT (OUTPATIENT)
Dept: PHYSICAL THERAPY | Facility: REHABILITATION | Age: 79
End: 2024-04-05
Payer: COMMERCIAL

## 2024-04-05 DIAGNOSIS — I89.0 SECONDARY LYMPHEDEMA: Primary | ICD-10-CM

## 2024-04-05 DIAGNOSIS — I89.0 LYMPHEDEMA: ICD-10-CM

## 2024-04-05 PROCEDURE — 97140 MANUAL THERAPY 1/> REGIONS: CPT | Mod: GP | Performed by: PHYSICAL THERAPIST

## 2024-04-08 ENCOUNTER — THERAPY VISIT (OUTPATIENT)
Dept: PHYSICAL THERAPY | Facility: REHABILITATION | Age: 79
End: 2024-04-08
Payer: COMMERCIAL

## 2024-04-08 DIAGNOSIS — I89.0 LYMPHEDEMA: ICD-10-CM

## 2024-04-08 DIAGNOSIS — I89.0 SECONDARY LYMPHEDEMA: Primary | ICD-10-CM

## 2024-04-08 DIAGNOSIS — M62.81 GENERALIZED MUSCLE WEAKNESS: ICD-10-CM

## 2024-04-08 PROCEDURE — 97140 MANUAL THERAPY 1/> REGIONS: CPT | Mod: GP | Performed by: PHYSICAL THERAPY ASSISTANT

## 2024-04-10 ENCOUNTER — THERAPY VISIT (OUTPATIENT)
Dept: PHYSICAL THERAPY | Facility: REHABILITATION | Age: 79
End: 2024-04-10
Payer: COMMERCIAL

## 2024-04-10 DIAGNOSIS — M62.81 GENERALIZED MUSCLE WEAKNESS: Primary | ICD-10-CM

## 2024-04-10 DIAGNOSIS — I89.0 LYMPHEDEMA: ICD-10-CM

## 2024-04-10 DIAGNOSIS — I89.0 SECONDARY LYMPHEDEMA: ICD-10-CM

## 2024-04-10 PROCEDURE — 97140 MANUAL THERAPY 1/> REGIONS: CPT | Mod: GP | Performed by: PHYSICAL THERAPY ASSISTANT

## 2024-04-12 ENCOUNTER — THERAPY VISIT (OUTPATIENT)
Dept: PHYSICAL THERAPY | Facility: REHABILITATION | Age: 79
End: 2024-04-12
Payer: COMMERCIAL

## 2024-04-12 DIAGNOSIS — I89.0 LYMPHEDEMA: ICD-10-CM

## 2024-04-12 DIAGNOSIS — I89.0 SECONDARY LYMPHEDEMA: Primary | ICD-10-CM

## 2024-04-12 PROCEDURE — 97140 MANUAL THERAPY 1/> REGIONS: CPT | Mod: GP | Performed by: PHYSICAL THERAPIST

## 2024-04-15 ENCOUNTER — THERAPY VISIT (OUTPATIENT)
Dept: PHYSICAL THERAPY | Facility: REHABILITATION | Age: 79
End: 2024-04-15
Payer: COMMERCIAL

## 2024-04-15 DIAGNOSIS — I89.0 LYMPHEDEMA: ICD-10-CM

## 2024-04-15 DIAGNOSIS — I89.0 SECONDARY LYMPHEDEMA: Primary | ICD-10-CM

## 2024-04-15 PROCEDURE — 97140 MANUAL THERAPY 1/> REGIONS: CPT | Mod: GP | Performed by: PHYSICAL THERAPIST

## 2024-04-17 ENCOUNTER — THERAPY VISIT (OUTPATIENT)
Dept: PHYSICAL THERAPY | Facility: REHABILITATION | Age: 79
End: 2024-04-17
Payer: COMMERCIAL

## 2024-04-17 DIAGNOSIS — I89.0 LYMPHEDEMA: ICD-10-CM

## 2024-04-17 DIAGNOSIS — I89.0 SECONDARY LYMPHEDEMA: Primary | ICD-10-CM

## 2024-04-17 PROCEDURE — 97116 GAIT TRAINING THERAPY: CPT | Mod: GP | Performed by: PHYSICAL THERAPIST

## 2024-04-17 PROCEDURE — 97140 MANUAL THERAPY 1/> REGIONS: CPT | Mod: GP | Performed by: PHYSICAL THERAPIST

## 2024-04-17 NOTE — PROGRESS NOTES
PLAN  Continue therapy per current plan of care.    Beginning/End Dates of Progress Note Reporting Period:  03/15/24 to 04/17/2024    Referring Provider:  Dr. Silas Reece          04/17/24 0500   Appointment Info   Total/Authorized Visits 40   Visits Used 29   Medical Diagnosis Lymphedema  (new order as of 2/13/24 from Dr. Reece)   PT Tx Diagnosis Secondary lymphedema   Other pertinent information Pt has B LE Circaid velcro wraps (previous Mediven plus petite, 20-30 mmHg extra wide calf with top band III)   Quick Adds Certification   Self Referred   Self Referred (PT)   (PT order received 2/13/24)   Progress Note/Certification   Start of Care Date 01/17/24   Onset of illness/injury or Date of Surgery 03/01/23   Therapy Frequency 3x/week   Predicted Duration up to 29 visits, 3 days/week to 5/20/24   Certification date from 02/21/24   Certification date to 05/20/24   Progress Note Due Date   (visit 39)   Progress Note Completed Date 03/15/24   Supervision   PT Assistant Visit Number 4       Present No   GOALS   PT Goals 2;3   PT Goal 1   Goal Identifier Home management   Goal Description Pt will be independent with home management of swelling including skin care/precautions, exercise, follow up with other providers (wound clinic, PCP, vascular, etc), compression bandaging and use of compression garments.   Goal Progress Goal progressing, pt's neighbor is helping with compression bandaging and wound cares at home, pt is unable to do this well on her own and family has not been helpful. Pt awaiting pneumatic pump. She is wearing velcro wraps between compression bandaging.   Target Date 05/20/24   PT Goal 2   Goal Identifier Volume   Goal Description Pt will demonstrate reduction in R LE volume of 23% and L LE volume of 7% to return to prior level of function.   Goal Progress Goal progressing, -8.8% in R LE and  +6.5% in L LE as of 4/15/24   Target Date 05/20/24   PT Goal 3   Goal  Identifier Balance   Goal Description Pt will demonstrate TUG in <13.5 sec with or without AD to indicate low falls risk   Goal Progress Goal not met with very little progress, 21.4 sec as of 4/17/24   Target Date 05/20/24   Subjective Report   Subjective Report Pt removed wraps last night (though she is not sure, it may have been today).   Objective Measures   Objective Measures Objective Measure 1;Objective Measure 2;Objective Measure 3   Objective Measure 1   Objective Measure Skin/wounds   Details R lower leg - no weeping. L lower leg - darker red skin, small scabbed over wounds   Objective Measure 2   Objective Measure TUG   Details 21.4 sec without AD   Objective Measure 3   Objective Measure Gait   Details Without AD: slow, wide PITO, shuffling gait, short step lengths, slumped posture; with 4WW, improved posture, increased speed and step length   Treatment Interventions (PT)   Interventions Self Care/Home Management;Manual Therapy;Therapeutic Procedure/Exercise;Gait Training   Gait Training   Gait Training Minutes, includes stair climbing (17838) 10   Gait 1 - Details Intro to SEC and 4WW with education on how to use them. Pt did better with 4WW with increased speed and stability compared to no use of AD. Educated pt on next steps for obtaining a device - pt will think about what she wants to do going forward.   Skilled Intervention Gait drills - introduction to AD   Patient Response/Progress Improved gait with 4WW   Manual Therapy   Manual Therapy: Mobilization, MFR, MLD, friction massage minutes (19264) 76   Manual Therapy 1 - Details Removed B Velcro wraps at start of session. Performed MLD to B LE to promote lymphatic flow starting with inguinal node clearing. Washed both legs with soap/water. Compression bandaging for B LE as follows: Desitin on all areas of both lower legs from ankle to knee, stockinette, Rosidal foam pad to the foot and then spiraled ankle to knee, 1 8cm, 1 10cm and 1 12 cm  short-stretch wraps from toes to below knee. Writer donned socks and Velcro shoes.   Skilled Intervention skin care, MLD and compression bandaging to promote lymphatic flow and wound healing   Patient Response/Progress Pain right leg with MLD today   Plan   Home program Purchase new [employer] shoes, bring wound supplies to clinic. Keep wraps on 2 days. Lymph stim exercises daily (reissued as of 2/6/24); strengthening: roll outs L5, seated leg press L5, STS   Updates to plan of care [employer] Flex[employer] pump demo was on 3/6; waiting for device to come in   Plan for next session Ask about 4WW - request order from MD? Continue MLD to B LEs and wrapping for B LEs   Comments   Comments Pt has been seen for 10 additional visits since 3/15/24 for R>L lymphedema with wounds with continued focus on decongestive therapy. The size of the R leg is down >8% overall and the L leg has increased in size recently, despite regular compression bandaging. She has pain in both legs and currently no open wounds, though the skin is often red/raw and painful. Using Desitin as primary wound care at this time. She had a pneumatic pump trial and is awaiting delivery of the pump at home. We have not had time during therapy to focus on strengthening/mobility, but pt did trial a 4WW today and this went very well. Her general mobility is poor and she is at high risk for falls. In addition, she has a very poor sleep schedule and has limited time in supine or with legs elevated during the day or even at night for a consistent amount of time. It is unclear at this time if there will be a satisfactory conclusion to therapy with improvement in the skin and volume of the legs that can be maintained at home as pt is unable to care for her legs on her own and relies on an elderly neighbor for wound cares and compression bandaging. In the meantime she remains appropriate for skilled PT per plan of care to continue to progress toward meeting goals.    Total Session Time   Timed Code Treatment Minutes 65   Total Treatment Time (sum of timed and untimed services) 65       Olga Saenz, PT, DPT, CLT  4/17/2024

## 2024-04-19 ENCOUNTER — THERAPY VISIT (OUTPATIENT)
Dept: PHYSICAL THERAPY | Facility: REHABILITATION | Age: 79
End: 2024-04-19
Payer: COMMERCIAL

## 2024-04-19 DIAGNOSIS — I89.0 SECONDARY LYMPHEDEMA: Primary | ICD-10-CM

## 2024-04-19 DIAGNOSIS — I89.0 LYMPHEDEMA: ICD-10-CM

## 2024-04-19 PROCEDURE — 97140 MANUAL THERAPY 1/> REGIONS: CPT | Mod: GP | Performed by: PHYSICAL THERAPIST

## 2024-04-22 ENCOUNTER — APPOINTMENT (OUTPATIENT)
Dept: MRI IMAGING | Facility: HOSPITAL | Age: 79
DRG: 602 | End: 2024-04-22
Attending: STUDENT IN AN ORGANIZED HEALTH CARE EDUCATION/TRAINING PROGRAM
Payer: COMMERCIAL

## 2024-04-22 ENCOUNTER — HOSPITAL ENCOUNTER (INPATIENT)
Facility: HOSPITAL | Age: 79
LOS: 1 days | Discharge: NURSING, REHAB OR LONG-TERM CARE | DRG: 602 | End: 2024-04-24
Attending: STUDENT IN AN ORGANIZED HEALTH CARE EDUCATION/TRAINING PROGRAM | Admitting: INTERNAL MEDICINE
Payer: COMMERCIAL

## 2024-04-22 ENCOUNTER — THERAPY VISIT (OUTPATIENT)
Dept: PHYSICAL THERAPY | Facility: REHABILITATION | Age: 79
End: 2024-04-22
Payer: COMMERCIAL

## 2024-04-22 DIAGNOSIS — R26.81 UNSTEADY GAIT: ICD-10-CM

## 2024-04-22 DIAGNOSIS — I89.0 SECONDARY LYMPHEDEMA: Primary | ICD-10-CM

## 2024-04-22 DIAGNOSIS — I89.0 LYMPHEDEMA: ICD-10-CM

## 2024-04-22 DIAGNOSIS — R41.0 CONFUSION: ICD-10-CM

## 2024-04-22 DIAGNOSIS — R23.9 IMPAIRED SKIN INTEGRITY: Primary | ICD-10-CM

## 2024-04-22 PROBLEM — N39.0 URINARY TRACT INFECTION: Status: ACTIVE | Noted: 2024-04-22

## 2024-04-22 PROBLEM — I10 PRIMARY HYPERTENSION: Status: ACTIVE | Noted: 2024-04-22

## 2024-04-22 LAB
ALBUMIN SERPL BCG-MCNC: 3.7 G/DL (ref 3.5–5.2)
ALBUMIN UR-MCNC: 10 MG/DL
ALP SERPL-CCNC: 77 U/L (ref 40–150)
ALT SERPL W P-5'-P-CCNC: 28 U/L (ref 0–50)
AMMONIA PLAS-SCNC: 12 UMOL/L (ref 11–51)
ANION GAP SERPL CALCULATED.3IONS-SCNC: 11 MMOL/L (ref 7–15)
APPEARANCE UR: CLEAR
AST SERPL W P-5'-P-CCNC: 45 U/L (ref 0–45)
BASOPHILS # BLD AUTO: 0 10E3/UL (ref 0–0.2)
BASOPHILS NFR BLD AUTO: 1 %
BILIRUB SERPL-MCNC: 0.4 MG/DL
BILIRUB UR QL STRIP: NEGATIVE
BUN SERPL-MCNC: 16.2 MG/DL (ref 8–23)
CALCIUM SERPL-MCNC: 9.7 MG/DL (ref 8.8–10.2)
CHLORIDE SERPL-SCNC: 106 MMOL/L (ref 98–107)
COLOR UR AUTO: ABNORMAL
CREAT SERPL-MCNC: 0.92 MG/DL (ref 0.51–0.95)
DEPRECATED HCO3 PLAS-SCNC: 26 MMOL/L (ref 22–29)
EGFRCR SERPLBLD CKD-EPI 2021: 63 ML/MIN/1.73M2
EOSINOPHIL # BLD AUTO: 0.2 10E3/UL (ref 0–0.7)
EOSINOPHIL NFR BLD AUTO: 2 %
ERYTHROCYTE [DISTWIDTH] IN BLOOD BY AUTOMATED COUNT: 16.4 % (ref 10–15)
ETHANOL SERPL-MCNC: <0.01 G/DL
GLUCOSE SERPL-MCNC: 106 MG/DL (ref 70–99)
GLUCOSE UR STRIP-MCNC: NEGATIVE MG/DL
HCT VFR BLD AUTO: 42.5 % (ref 35–47)
HGB BLD-MCNC: 13.5 G/DL (ref 11.7–15.7)
HGB UR QL STRIP: NEGATIVE
IMM GRANULOCYTES # BLD: 0 10E3/UL
IMM GRANULOCYTES NFR BLD: 0 %
KETONES UR STRIP-MCNC: ABNORMAL MG/DL
LEUKOCYTE ESTERASE UR QL STRIP: ABNORMAL
LYMPHOCYTES # BLD AUTO: 1.5 10E3/UL (ref 0.8–5.3)
LYMPHOCYTES NFR BLD AUTO: 18 %
MAGNESIUM SERPL-MCNC: 2.2 MG/DL (ref 1.7–2.3)
MCH RBC QN AUTO: 27.4 PG (ref 26.5–33)
MCHC RBC AUTO-ENTMCNC: 31.8 G/DL (ref 31.5–36.5)
MCV RBC AUTO: 86 FL (ref 78–100)
MONOCYTES # BLD AUTO: 0.7 10E3/UL (ref 0–1.3)
MONOCYTES NFR BLD AUTO: 9 %
MUCOUS THREADS #/AREA URNS LPF: PRESENT /LPF
NEUTROPHILS # BLD AUTO: 5.7 10E3/UL (ref 1.6–8.3)
NEUTROPHILS NFR BLD AUTO: 70 %
NITRATE UR QL: NEGATIVE
NRBC # BLD AUTO: 0 10E3/UL
NRBC BLD AUTO-RTO: 0 /100
PH UR STRIP: 5.5 [PH] (ref 5–7)
PLATELET # BLD AUTO: 230 10E3/UL (ref 150–450)
POTASSIUM SERPL-SCNC: 4 MMOL/L (ref 3.4–5.3)
PROT SERPL-MCNC: 6.9 G/DL (ref 6.4–8.3)
RBC # BLD AUTO: 4.93 10E6/UL (ref 3.8–5.2)
RBC URINE: 2 /HPF
SODIUM SERPL-SCNC: 143 MMOL/L (ref 135–145)
SP GR UR STRIP: 1.02 (ref 1–1.03)
SQUAMOUS EPITHELIAL: <1 /HPF
TROPONIN T SERPL HS-MCNC: 13 NG/L
UROBILINOGEN UR STRIP-MCNC: <2 MG/DL
WBC # BLD AUTO: 8.2 10E3/UL (ref 4–11)
WBC URINE: 57 /HPF

## 2024-04-22 PROCEDURE — 84439 ASSAY OF FREE THYROXINE: CPT | Performed by: FAMILY MEDICINE

## 2024-04-22 PROCEDURE — 82077 ASSAY SPEC XCP UR&BREATH IA: CPT | Performed by: STUDENT IN AN ORGANIZED HEALTH CARE EDUCATION/TRAINING PROGRAM

## 2024-04-22 PROCEDURE — 96365 THER/PROPH/DIAG IV INF INIT: CPT | Mod: 59

## 2024-04-22 PROCEDURE — 255N000002 HC RX 255 OP 636: Performed by: STUDENT IN AN ORGANIZED HEALTH CARE EDUCATION/TRAINING PROGRAM

## 2024-04-22 PROCEDURE — 36415 COLL VENOUS BLD VENIPUNCTURE: CPT | Performed by: STUDENT IN AN ORGANIZED HEALTH CARE EDUCATION/TRAINING PROGRAM

## 2024-04-22 PROCEDURE — A9585 GADOBUTROL INJECTION: HCPCS | Performed by: STUDENT IN AN ORGANIZED HEALTH CARE EDUCATION/TRAINING PROGRAM

## 2024-04-22 PROCEDURE — 250N000011 HC RX IP 250 OP 636: Performed by: STUDENT IN AN ORGANIZED HEALTH CARE EDUCATION/TRAINING PROGRAM

## 2024-04-22 PROCEDURE — 84443 ASSAY THYROID STIM HORMONE: CPT | Performed by: FAMILY MEDICINE

## 2024-04-22 PROCEDURE — 87086 URINE CULTURE/COLONY COUNT: CPT | Performed by: STUDENT IN AN ORGANIZED HEALTH CARE EDUCATION/TRAINING PROGRAM

## 2024-04-22 PROCEDURE — 83735 ASSAY OF MAGNESIUM: CPT | Performed by: STUDENT IN AN ORGANIZED HEALTH CARE EDUCATION/TRAINING PROGRAM

## 2024-04-22 PROCEDURE — 84145 PROCALCITONIN (PCT): CPT | Performed by: FAMILY MEDICINE

## 2024-04-22 PROCEDURE — 99285 EMERGENCY DEPT VISIT HI MDM: CPT | Mod: 25

## 2024-04-22 PROCEDURE — 70544 MR ANGIOGRAPHY HEAD W/O DYE: CPT

## 2024-04-22 PROCEDURE — 96375 TX/PRO/DX INJ NEW DRUG ADDON: CPT

## 2024-04-22 PROCEDURE — 93005 ELECTROCARDIOGRAM TRACING: CPT | Performed by: STUDENT IN AN ORGANIZED HEALTH CARE EDUCATION/TRAINING PROGRAM

## 2024-04-22 PROCEDURE — G0378 HOSPITAL OBSERVATION PER HR: HCPCS

## 2024-04-22 PROCEDURE — 99207 PR NO CHARGE LOS: CPT | Mod: GP | Performed by: PHYSICAL THERAPIST

## 2024-04-22 PROCEDURE — 99223 1ST HOSP IP/OBS HIGH 75: CPT | Mod: GC | Performed by: INTERNAL MEDICINE

## 2024-04-22 PROCEDURE — 70549 MR ANGIOGRAPH NECK W/O&W/DYE: CPT

## 2024-04-22 PROCEDURE — 82140 ASSAY OF AMMONIA: CPT | Performed by: STUDENT IN AN ORGANIZED HEALTH CARE EDUCATION/TRAINING PROGRAM

## 2024-04-22 PROCEDURE — 70553 MRI BRAIN STEM W/O & W/DYE: CPT

## 2024-04-22 PROCEDURE — 84484 ASSAY OF TROPONIN QUANT: CPT | Performed by: STUDENT IN AN ORGANIZED HEALTH CARE EDUCATION/TRAINING PROGRAM

## 2024-04-22 PROCEDURE — 81001 URINALYSIS AUTO W/SCOPE: CPT | Performed by: STUDENT IN AN ORGANIZED HEALTH CARE EDUCATION/TRAINING PROGRAM

## 2024-04-22 PROCEDURE — 250N000011 HC RX IP 250 OP 636: Performed by: INTERNAL MEDICINE

## 2024-04-22 PROCEDURE — 80053 COMPREHEN METABOLIC PANEL: CPT | Performed by: STUDENT IN AN ORGANIZED HEALTH CARE EDUCATION/TRAINING PROGRAM

## 2024-04-22 PROCEDURE — 85025 COMPLETE CBC W/AUTO DIFF WBC: CPT | Performed by: STUDENT IN AN ORGANIZED HEALTH CARE EDUCATION/TRAINING PROGRAM

## 2024-04-22 RX ORDER — ACETAMINOPHEN 325 MG/1
650 TABLET ORAL EVERY 4 HOURS PRN
Status: DISCONTINUED | OUTPATIENT
Start: 2024-04-22 | End: 2024-04-24 | Stop reason: HOSPADM

## 2024-04-22 RX ORDER — SIMVASTATIN 10 MG
40 TABLET ORAL AT BEDTIME
Status: DISCONTINUED | OUTPATIENT
Start: 2024-04-22 | End: 2024-04-24 | Stop reason: HOSPADM

## 2024-04-22 RX ORDER — LEVOTHYROXINE SODIUM 125 UG/1
125 TABLET ORAL EVERY MORNING
Status: DISCONTINUED | OUTPATIENT
Start: 2024-04-23 | End: 2024-04-24 | Stop reason: HOSPADM

## 2024-04-22 RX ORDER — LEVOTHYROXINE SODIUM 125 MCG
125 TABLET ORAL EVERY MORNING
COMMUNITY
Start: 2024-04-22

## 2024-04-22 RX ORDER — PANTOPRAZOLE SODIUM 40 MG/1
40 TABLET, DELAYED RELEASE ORAL
Status: DISCONTINUED | OUTPATIENT
Start: 2024-04-23 | End: 2024-04-24 | Stop reason: HOSPADM

## 2024-04-22 RX ORDER — AMOXICILLIN 250 MG
2 CAPSULE ORAL 2 TIMES DAILY PRN
Status: DISCONTINUED | OUTPATIENT
Start: 2024-04-22 | End: 2024-04-24 | Stop reason: HOSPADM

## 2024-04-22 RX ORDER — FAMOTIDINE 20 MG/1
40 TABLET, FILM COATED ORAL EVERY EVENING
Status: DISCONTINUED | OUTPATIENT
Start: 2024-04-22 | End: 2024-04-24 | Stop reason: HOSPADM

## 2024-04-22 RX ORDER — AMOXICILLIN 250 MG
1 CAPSULE ORAL 2 TIMES DAILY PRN
Status: DISCONTINUED | OUTPATIENT
Start: 2024-04-22 | End: 2024-04-24 | Stop reason: HOSPADM

## 2024-04-22 RX ORDER — CEFTRIAXONE 2 G/1
2 INJECTION, POWDER, FOR SOLUTION INTRAMUSCULAR; INTRAVENOUS ONCE
Status: COMPLETED | OUTPATIENT
Start: 2024-04-22 | End: 2024-04-22

## 2024-04-22 RX ORDER — ONDANSETRON 4 MG/1
4 TABLET, ORALLY DISINTEGRATING ORAL EVERY 6 HOURS PRN
Status: DISCONTINUED | OUTPATIENT
Start: 2024-04-22 | End: 2024-04-24 | Stop reason: HOSPADM

## 2024-04-22 RX ORDER — ASPIRIN 81 MG/1
81 TABLET ORAL DAILY
Status: DISCONTINUED | OUTPATIENT
Start: 2024-04-23 | End: 2024-04-24 | Stop reason: HOSPADM

## 2024-04-22 RX ORDER — GADOBUTROL 604.72 MG/ML
10 INJECTION INTRAVENOUS ONCE
Status: COMPLETED | OUTPATIENT
Start: 2024-04-22 | End: 2024-04-22

## 2024-04-22 RX ORDER — HYDRALAZINE HYDROCHLORIDE 20 MG/ML
10 INJECTION INTRAMUSCULAR; INTRAVENOUS EVERY 4 HOURS PRN
Status: DISCONTINUED | OUTPATIENT
Start: 2024-04-22 | End: 2024-04-24 | Stop reason: HOSPADM

## 2024-04-22 RX ORDER — LOSARTAN POTASSIUM 50 MG/1
100 TABLET ORAL DAILY
Status: DISCONTINUED | OUTPATIENT
Start: 2024-04-23 | End: 2024-04-24 | Stop reason: HOSPADM

## 2024-04-22 RX ORDER — MULTIVIT WITH MINERALS/LUTEIN
1 TABLET ORAL DAILY
COMMUNITY

## 2024-04-22 RX ORDER — ACETAMINOPHEN 650 MG/1
650 SUPPOSITORY RECTAL EVERY 4 HOURS PRN
Status: DISCONTINUED | OUTPATIENT
Start: 2024-04-22 | End: 2024-04-24 | Stop reason: HOSPADM

## 2024-04-22 RX ORDER — ENOXAPARIN SODIUM 100 MG/ML
40 INJECTION SUBCUTANEOUS EVERY 24 HOURS
Status: DISCONTINUED | OUTPATIENT
Start: 2024-04-22 | End: 2024-04-24 | Stop reason: HOSPADM

## 2024-04-22 RX ORDER — ONDANSETRON 2 MG/ML
4 INJECTION INTRAMUSCULAR; INTRAVENOUS EVERY 6 HOURS PRN
Status: DISCONTINUED | OUTPATIENT
Start: 2024-04-22 | End: 2024-04-24 | Stop reason: HOSPADM

## 2024-04-22 RX ORDER — CEFTRIAXONE 1 G/1
1 INJECTION, POWDER, FOR SOLUTION INTRAMUSCULAR; INTRAVENOUS EVERY 24 HOURS
Status: DISCONTINUED | OUTPATIENT
Start: 2024-04-23 | End: 2024-04-24 | Stop reason: HOSPADM

## 2024-04-22 RX ADMIN — CEFTRIAXONE SODIUM 2 G: 2 INJECTION, POWDER, FOR SOLUTION INTRAMUSCULAR; INTRAVENOUS at 19:51

## 2024-04-22 RX ADMIN — HYDRALAZINE HYDROCHLORIDE 10 MG: 20 INJECTION INTRAMUSCULAR; INTRAVENOUS at 23:07

## 2024-04-22 RX ADMIN — GADOBUTROL 10 ML: 604.72 INJECTION INTRAVENOUS at 19:34

## 2024-04-22 ASSESSMENT — ACTIVITIES OF DAILY LIVING (ADL)
ADLS_ACUITY_SCORE: 35

## 2024-04-22 ASSESSMENT — COLUMBIA-SUICIDE SEVERITY RATING SCALE - C-SSRS
6. HAVE YOU EVER DONE ANYTHING, STARTED TO DO ANYTHING, OR PREPARED TO DO ANYTHING TO END YOUR LIFE?: NO
2. HAVE YOU ACTUALLY HAD ANY THOUGHTS OF KILLING YOURSELF IN THE PAST MONTH?: NO
1. IN THE PAST MONTH, HAVE YOU WISHED YOU WERE DEAD OR WISHED YOU COULD GO TO SLEEP AND NOT WAKE UP?: NO

## 2024-04-22 NOTE — ED NOTES
Bed: JNEDH-E  Expected date: 4/22/24  Expected time: 4:34 PM  Means of arrival: Ambulance  Comments:  [place] - altered x 1.5 weeks

## 2024-04-22 NOTE — ED PROVIDER NOTES
NAME: Irene Pena  AGE: 78 year old female  YOB: 1945  MRN: [id number]  EVALUATION DATE & TIME: 2024  4:41 PM    PCP: Ronn Coleman  ED PROVIDER: Komal Chin MD.    Chief Complaint   Patient presents with    Altered Mental Status     Somewhat confused       FINAL IMPRESSION:  No diagnosis found.    MEDICAL DECISION MAKIN:01 PM I met with the patient, obtained history, performed an initial exam, and discussed options and plan for diagnostics and treatment here in the ED.   8:19 PM I updated the patient and patient's son.  8:37 PM I spoke with Dr. Luna, stroke neurology.  8:58 PM I spoke with Dr Luna, stroke neurology.  9:29 PM I spoke to Dr. Walker, hospitalist who accepts the patient for admission.    MDM: 78-year-old female presents with confusion.  Patient was sent here from the lymphedema clinic where they noted her to be more confused than usual and having increased difficulty walking.  Patient does not report any pain, she does not feel confused and does not have any other symptoms.    CBC and CMP are generally reassuring.  Alcohol level is negative.  Normal ammonia.  EKG shows sinus rhythm without concerning ischemic changes.  I did repeat her QTc is prolonged but on my visual read this does not appear prolonged.  I did check a magnesium as well which was normal.  Her UA does show some findings of infection, she was started on ceftriaxone and this could be explaining some of her confusion.  Also considered intracranial pathology such as hemorrhage, mass stroke, etc.  MRI/MRA of her head and neck showed no acute infarct but does have several areas of severe stenosis.  Discussed with stroke neurology no acute recs from them.  Patient son was in the emergency room and he does feel like she is more confused than her baseline.  Per nursing she was very unsteady on her feet when they tried to ambulate her to the bathroom.  Given this discussed observation admission with  "patient and her son and they are in agreement.  She was excepted for admission by the hospitalist.      Medical Decision Making  Obtained supplemental history:Supplemental history obtained?: EMS  Reviewed external records: External records reviewed?: Documented in chart  Care impacted by chronic illness:Other: lymphedema  Care significantly affected by social determinants of health:N/A  Did you consider but not order tests?: Work up considered but not performed and documented in chart, if applicable  Did you interpret images independently?: Independent interpretation of ECG and images noted in documentation, when applicable.  Consultation discussion with other provider:Did you involve another provider (consultant, , pharmacy, etc.)?: I discussed the care with another health care provider, see documentation for details.  Admit.    MEDICATIONS GIVEN IN THE EMERGENCY:  Medications   cefTRIAXone (ROCEPHIN) 2 g vial to attach to  ml bag for ADULTS or NS 50 ml bag for PEDS (0 g Intravenous Stopped 4/22/24 2035)   gadobutrol (GADAVIST) injection 10 mL (10 mLs Intravenous $Given 4/22/24 1934)       NEW PRESCRIPTIONS STARTED AT TODAY'S ER VISIT:  New Prescriptions    No medications on file        =================================================================  HPI    Patient information was obtained from: Patient   Use of : N/A       Irene Pena is a 78 year old female with a past medical history of lymphedema in her bilateral lower extremities, who presents to the ED by EMS for altered mental status.    Per EMS, the patient was at therapy today for lymphedema. Her therapist noted that she was more confused than at baseline and had high blood pressure and increased difficulty walking.    Per the patient, she admits that her therapist was \"correct in some aspect\" regarding her confusion. Patient states that since she is retired, she does not \"pay much attention to the days\". She reports that she " had issues walking at physical therapy this morning because she did not have her velcro socks on. Patient denies headache, vision changes, chest pain, abdominal pain, urinary symptoms, fever, cough, vomiting or diarrhea. She reports no medication changes recently. She lives with her  who she describes is often not home and has no other complaints at this time.     PAST MEDICAL HISTORY:  History reviewed. No pertinent past medical history.    PAST SURGICAL HISTORY:  Past Surgical History:   Procedure Laterality Date    CHOLECYSTECTOMY      EYE SURGERY      STRIP VEIN Left 2006    rfa       CURRENT MEDICATIONS:    No current facility-administered medications for this encounter.    Current Outpatient Medications:     acetaZOLAMIDE (DIAMOX) 250 MG tablet, [ACETAZOLAMIDE (DIAMOX) 250 MG TABLET]  (Patient not taking: Reported on 1/25/2024), Disp: , Rfl:     aspirin 81 MG EC tablet, [ASPIRIN 81 MG EC TABLET] Take 81 mg by mouth daily., Disp: , Rfl:     azithromycin (ZITHROMAX) 500 MG tablet, , Disp: , Rfl:     famotidine (PEPCID) 40 MG tablet, , Disp: , Rfl:     FOLIC ACID/MULTIVIT-MIN/LUTEIN (CENTRUM SILVER ORAL), [FOLIC ACID/MULTIVIT-MIN/LUTEIN (CENTRUM SILVER ORAL)] Take by mouth. (Patient not taking: Reported on 1/25/2024), Disp: , Rfl:     losartan (COZAAR) 100 MG tablet, [LOSARTAN (COZAAR) 100 MG TABLET] , Disp: , Rfl:     naproxen sodium (ALEVE) 220 MG tablet, [NAPROXEN SODIUM (ALEVE) 220 MG TABLET] Take 220 mg by mouth every 12 (twelve) hours as needed for pain. (Patient not taking: Reported on 1/25/2024), Disp: , Rfl:     NYAMYC 628660 UNIT/GM external powder, , Disp: , Rfl:     omeprazole (PRILOSEC) 20 MG capsule, [OMEPRAZOLE (PRILOSEC) 20 MG CAPSULE]  (Patient not taking: Reported on 1/25/2024), Disp: , Rfl:     ranitidine (ZANTAC) 300 MG tablet, [RANITIDINE (ZANTAC) 300 MG TABLET] , Disp: , Rfl:     simvastatin (ZOCOR) 40 MG tablet, [SIMVASTATIN (ZOCOR) 40 MG TABLET] , Disp: , Rfl:     SYNTHROID 112 mcg  tablet, [SYNTHROID 112 MCG TABLET] , Disp: , Rfl:     triamcinolone (KENALOG) 0.025 % cream, Apply topically 2 times daily, Disp: 15 g, Rfl: 3    VANIQA 13.9 % cream, [VANIQA 13.9 % CREAM] , Disp: , Rfl:     ALLERGIES:  Allergies   Allergen Reactions    Amoxicillin Nausea and Vomiting    Lisinopril Cough    Latex Rash       FAMILY HISTORY:  Family History   Problem Relation Age of Onset    No Known Problems Mother     No Known Problems Father     No Known Problems Sister     No Known Problems Daughter     No Known Problems Maternal Grandmother     No Known Problems Maternal Grandfather     No Known Problems Paternal Grandmother     No Known Problems Paternal Grandfather     No Known Problems Maternal Aunt     No Known Problems Paternal Aunt     Hereditary Breast and Ovarian Cancer Syndrome No family hx of     Breast Cancer No family hx of     Cancer No family hx of     Colon Cancer No family hx of     Endometrial Cancer No family hx of     Ovarian Cancer No family hx of        SOCIAL HISTORY:   Social History     Socioeconomic History    Marital status:    Tobacco Use    Smoking status: Never    Smokeless tobacco: Never   Substance and Sexual Activity    Alcohol use: Yes    Drug use: No     Social Determinants of Health      [facility] [place]    Social Connections       PHYSICAL EXAM:    Vitals: BP (!) 191/81   Pulse 83   Temp 98.3  F (36.8  C) (Oral)   Resp (!) 31   Wt 79.4 kg (175 lb)   SpO2 95%   BMI 30.04 kg/m     Constitutional: Well developed, well nourished. No acute distress.  HENT: Normocephalic, atraumatic. Neck-gross ROM intact.   Eyes: Pupils mid-range, PERRL, EOMI, sclera white  Respiratory: CTAB, no respiratory distress  Cardiovascular: Normal heart rate, regular rhythm. Bilateral LE edema with some venous stasis changes and flaking to the skin  GI: Soft, not distended, non tender, no  guarding  Musculoskeletal: Moving extremities intentionally and without pain. No obvious deformity.  Skin: Warm, dry, no rash.  Neuro: Alert and oriented to self, date, place, CN II-XII grossly intact, speech clear. 5/5 strength in upper and lower extremities. Sensation to light touch intact in all 4 extremities. No pronator drift. Mild bilateral dysmetria with finger to nose. No arm or leg drift.      LAB:  All pertinent labs reviewed and interpreted.  Labs Ordered and Resulted from Time of ED Arrival to Time of ED Departure   COMPREHENSIVE METABOLIC PANEL - Abnormal       Result Value    Sodium 143      Potassium 4.0      Carbon Dioxide (CO2) 26      Anion Gap 11      Urea Nitrogen 16.2      Creatinine 0.92      GFR Estimate 63      Calcium 9.7      Chloride 106      Glucose 106 (*)     Alkaline Phosphatase 77      AST 45      ALT 28      Protein Total 6.9      Albumin 3.7      Bilirubin Total 0.4     ROUTINE UA WITH MICROSCOPIC REFLEX TO CULTURE - Abnormal    Color Urine Light Yellow      Appearance Urine Clear      Glucose Urine Negative      Bilirubin Urine Negative      Ketones Urine Trace (*)     Specific Gravity Urine 1.025      Blood Urine Negative      pH Urine 5.5      Protein Albumin Urine 10 (*)     Urobilinogen Urine <2.0      Nitrite Urine Negative      Leukocyte Esterase Urine 500 Bertha/uL (*)     Mucus Urine Present (*)     RBC Urine 2      WBC Urine 57 (*)     Squamous Epithelials Urine <1     CBC WITH PLATELETS AND DIFFERENTIAL - Abnormal    WBC Count 8.2      RBC Count 4.93      Hemoglobin 13.5      Hematocrit 42.5      MCV 86      MCH 27.4      MCHC 31.8      RDW 16.4 (*)     Platelet Count 230      % Neutrophils 70      % Lymphocytes 18      % Monocytes 9      % Eosinophils 2      % Basophils 1      % Immature Granulocytes 0      NRBCs per 100 WBC 0      Absolute Neutrophils 5.7      Absolute Lymphocytes 1.5      Absolute Monocytes 0.7      Absolute Eosinophils 0.2      Absolute Basophils 0.0       Absolute Immature Granulocytes 0.0      Absolute NRBCs 0.0     ETHYL ALCOHOL LEVEL - Normal    Alcohol ethyl <0.01     AMMONIA - Normal    Ammonia 12     TROPONIN T, HIGH SENSITIVITY - Normal    Troponin T, High Sensitivity 13     MAGNESIUM - Normal    Magnesium 2.2     URINE CULTURE       RADIOLOGY:  MR Brain w/o & w Contrast   Final Result   IMPRESSION:   HEAD MRI:    1.  No acute or subacute ischemic change.   2.  No acute intracranial process or abnormal enhancement.   3.  Generalized brain atrophy and presumed microvascular ischemic changes as detailed above.      HEAD MRA:    1.  Severe stenosis distal right V4 segment.   2.  Multiple severe stenoses of the right P2 segment.   3.  Moderate stenosis left P1 segment.   4.  No large vessel occlusion or aneurysm.      NECK MRA:   1.  Severe stenosis origin of the right vertebral artery.   2.  No hemodynamically significant stenosis in the internal carotid arteries or left vertebral artery.      MRA Brain ([place]) wo Contrast   Final Result   IMPRESSION:   HEAD MRI:    1.  No acute or subacute ischemic change.   2.  No acute intracranial process or abnormal enhancement.   3.  Generalized brain atrophy and presumed microvascular ischemic changes as detailed above.      HEAD MRA:    1.  Severe stenosis distal right V4 segment.   2.  Multiple severe stenoses of the right P2 segment.   3.  Moderate stenosis left P1 segment.   4.  No large vessel occlusion or aneurysm.      NECK MRA:   1.  Severe stenosis origin of the right vertebral artery.   2.  No hemodynamically significant stenosis in the internal carotid arteries or left vertebral artery.      MRA Neck (Carotids) wo & w Contrast   Final Result   IMPRESSION:   HEAD MRI:    1.  No acute or subacute ischemic change.   2.  No acute intracranial process or abnormal enhancement.   3.  Generalized brain atrophy and presumed microvascular ischemic changes as detailed above.      HEAD MRA:    1.  Severe  stenosis distal right V4 segment.   2.  Multiple severe stenoses of the right P2 segment.   3.  Moderate stenosis left P1 segment.   4.  No large vessel occlusion or aneurysm.      NECK MRA:   1.  Severe stenosis origin of the right vertebral artery.   2.  No hemodynamically significant stenosis in the internal carotid arteries or left vertebral artery.          EKG:   Performed at: 4/22/24 at 17:27:05  Impression: Sinus rhythm. Cannot rule out Inferior infarct, age undetermined. Anterior infarct, age undetermined. Prolonged QT-looks normal visually. Abnormal ECG.  Rate: 75 BPM  Rhythm: Sinus rhythm  QRS Interval: 100 ms  QTc Interval: 605 ms  Comparison: No prior ECG's available for comparison.  I have independently reviewed and interpreted the EKG(s) documented above.     PROCEDURES:   Procedures     I, Andrei Graves, am serving as a scribe to document services personally performed by Dr. Komal Chin based on my observation and the provider's statements to me. I, Komal Chin MD attest that Andrei Graves is acting in a scribe capacity, has observed my performance of the services and has documented them in accordance with my direction.    Komal Chin M.D.  Emergency Medicine  [facility] EMERGENCY DEPARTMENT  [facility] [facility] [id number] [phone]  Dept: [phone]     Komal Chin MD  04/22/24 3731

## 2024-04-22 NOTE — ED TRIAGE NOTES
Pt seen at lymphedema clinic today that she drove to and staff noted her to be below baseline mental status. Pt was transported here via ambulance. Indeed, pt is alert when I am speaking with her, but doses off quickly when left alone. Oriented to self, place, events of the day, but only can tell me April, 2024, has no idea about the day number. Irene leans to the right and is globally weak, for example after sitting up, she falls back in bed repeatedly. She follows commands. She moves all extremities, but the RLE is more edematous than the left and I can tell it is difficult for her to raise off the bed, although she can do it. CMS intact in BLE. Pt arrived to the clinic in shorts, as she could not get her pants on. Staff there was quite concerned about this, as it is very atypical for her.      Triage Assessment (Adult)       Row Name 04/22/24 9943          Triage Assessment    Airway WDL WDL        Respiratory WDL    Respiratory WDL WDL        Skin Circulation/Temperature WDL    Skin Circulation/Temperature WDL all;X  redness under bilateral breasts, dry scaling and redness in BLE with edema worse in RLE     Skin Temperature warm        Cardiac WDL    Cardiac WDL X  hypertensive        Peripheral/Neurovascular WDL    Peripheral Neurovascular WDL pulse assessment     Pulse Assessment posterior tibial;dorsalis pedis        Pulse Dorsalis Pedis    Left Dorsalis Pedis Pulse 2+ (normal)     Right Dorsalis Pedis Pulse 2+ (normal)        Pulse Posterior Tibial    Left Posterior Tibial Pulse 2+ (normal)     Right Posterior Tibial Pulse 2+ (normal)        Cognitive/Neuro/Behavioral WDL    Cognitive/Neuro/Behavioral WDL all     Level of Consciousness intermittent confusion;lethargic     Orientation disoriented to;time  knows month and year, way off for the exact day     Speech spontaneous;pace/rate variance     Mood/Behavior cooperative;anxious        Pupils (CN II)    Pupil PERRLA yes     Pupil Size Left 3 mm     Pupil  Size Right 3 mm        [place] Coma Scale    Best Eye Response 4-->(E4) spontaneous     Best Motor Response 6-->(M6) obeys commands     Best Verbal Response 4-->(V4) confused     [place] Coma Scale Score 14        Motor Response    General Motor Response other (see comments)  leans to the right     Left Motor Response purposeful motor response     Right Motor Response purposeful motor response     LUE Motor Response purposeful motor response;other (see comments)  no drift     RUE Motor Response purposeful motor response;other (see comments)  no drift     LLE Motor Response purposeful motor response;other (see comments)  no drift     RLE Motor Response purposeful motor response;other (see comments)  no drift

## 2024-04-23 ENCOUNTER — APPOINTMENT (OUTPATIENT)
Dept: OCCUPATIONAL THERAPY | Facility: HOSPITAL | Age: 79
DRG: 602 | End: 2024-04-23
Attending: INTERNAL MEDICINE
Payer: COMMERCIAL

## 2024-04-23 ENCOUNTER — APPOINTMENT (OUTPATIENT)
Dept: PHYSICAL THERAPY | Facility: HOSPITAL | Age: 79
DRG: 602 | End: 2024-04-23
Payer: COMMERCIAL

## 2024-04-23 PROBLEM — R23.9 IMPAIRED SKIN INTEGRITY: Status: ACTIVE | Noted: 2024-04-23

## 2024-04-23 LAB
PROCALCITONIN SERPL IA-MCNC: 0.08 NG/ML
T4 FREE SERPL-MCNC: 1.36 NG/DL (ref 0.9–1.7)
TSH SERPL DL<=0.005 MIU/L-ACNC: 6.28 UIU/ML (ref 0.3–4.2)

## 2024-04-23 PROCEDURE — 99418 PROLNG IP/OBS E/M EA 15 MIN: CPT | Performed by: PSYCHIATRY & NEUROLOGY

## 2024-04-23 PROCEDURE — 97535 SELF CARE MNGMENT TRAINING: CPT | Mod: GO

## 2024-04-23 PROCEDURE — 97166 OT EVAL MOD COMPLEX 45 MIN: CPT | Mod: GO

## 2024-04-23 PROCEDURE — G0378 HOSPITAL OBSERVATION PER HR: HCPCS

## 2024-04-23 PROCEDURE — 250N000013 HC RX MED GY IP 250 OP 250 PS 637: Performed by: INTERNAL MEDICINE

## 2024-04-23 PROCEDURE — 99233 SBSQ HOSP IP/OBS HIGH 50: CPT | Mod: FS

## 2024-04-23 PROCEDURE — 99207 PR APP CREDIT; MD BILLING SHARED VISIT: CPT | Mod: FS | Performed by: FAMILY MEDICINE

## 2024-04-23 PROCEDURE — 96372 THER/PROPH/DIAG INJ SC/IM: CPT | Performed by: INTERNAL MEDICINE

## 2024-04-23 PROCEDURE — 97162 PT EVAL MOD COMPLEX 30 MIN: CPT | Mod: GP

## 2024-04-23 PROCEDURE — 96376 TX/PRO/DX INJ SAME DRUG ADON: CPT

## 2024-04-23 PROCEDURE — 99223 1ST HOSP IP/OBS HIGH 75: CPT | Performed by: PSYCHIATRY & NEUROLOGY

## 2024-04-23 PROCEDURE — 97530 THERAPEUTIC ACTIVITIES: CPT | Mod: GO

## 2024-04-23 PROCEDURE — 120N000001 HC R&B MED SURG/OB

## 2024-04-23 PROCEDURE — G0463 HOSPITAL OUTPT CLINIC VISIT: HCPCS

## 2024-04-23 PROCEDURE — 97530 THERAPEUTIC ACTIVITIES: CPT | Mod: GP

## 2024-04-23 PROCEDURE — 250N000011 HC RX IP 250 OP 636: Performed by: INTERNAL MEDICINE

## 2024-04-23 RX ORDER — NALOXONE HYDROCHLORIDE 0.4 MG/ML
0.4 INJECTION, SOLUTION INTRAMUSCULAR; INTRAVENOUS; SUBCUTANEOUS
Status: DISCONTINUED | OUTPATIENT
Start: 2024-04-23 | End: 2024-04-24 | Stop reason: HOSPADM

## 2024-04-23 RX ORDER — NALOXONE HYDROCHLORIDE 0.4 MG/ML
0.2 INJECTION, SOLUTION INTRAMUSCULAR; INTRAVENOUS; SUBCUTANEOUS
Status: DISCONTINUED | OUTPATIENT
Start: 2024-04-23 | End: 2024-04-24 | Stop reason: HOSPADM

## 2024-04-23 RX ORDER — TRAMADOL HYDROCHLORIDE 50 MG/1
50 TABLET ORAL EVERY 6 HOURS PRN
Status: DISCONTINUED | OUTPATIENT
Start: 2024-04-23 | End: 2024-04-24 | Stop reason: HOSPADM

## 2024-04-23 RX ADMIN — ENOXAPARIN SODIUM 40 MG: 40 INJECTION SUBCUTANEOUS at 00:19

## 2024-04-23 RX ADMIN — CEFTRIAXONE SODIUM 1 G: 1 INJECTION, POWDER, FOR SOLUTION INTRAMUSCULAR; INTRAVENOUS at 18:44

## 2024-04-23 RX ADMIN — PANTOPRAZOLE SODIUM 40 MG: 40 TABLET, DELAYED RELEASE ORAL at 08:48

## 2024-04-23 RX ADMIN — FAMOTIDINE 40 MG: 20 TABLET ORAL at 00:18

## 2024-04-23 RX ADMIN — HYDRALAZINE HYDROCHLORIDE 10 MG: 20 INJECTION INTRAMUSCULAR; INTRAVENOUS at 14:04

## 2024-04-23 RX ADMIN — ACETAMINOPHEN 650 MG: 325 TABLET ORAL at 12:11

## 2024-04-23 RX ADMIN — LEVOTHYROXINE SODIUM 125 MCG: 125 TABLET ORAL at 08:48

## 2024-04-23 RX ADMIN — SIMVASTATIN 40 MG: 40 TABLET, FILM COATED ORAL at 22:00

## 2024-04-23 RX ADMIN — ENOXAPARIN SODIUM 40 MG: 40 INJECTION SUBCUTANEOUS at 22:04

## 2024-04-23 RX ADMIN — LOSARTAN POTASSIUM 100 MG: 50 TABLET, FILM COATED ORAL at 08:48

## 2024-04-23 RX ADMIN — TRAMADOL HYDROCHLORIDE 50 MG: 50 TABLET, COATED ORAL at 08:48

## 2024-04-23 RX ADMIN — ASPIRIN 81 MG: 81 TABLET, COATED ORAL at 08:48

## 2024-04-23 RX ADMIN — TRAMADOL HYDROCHLORIDE 50 MG: 50 TABLET, COATED ORAL at 14:04

## 2024-04-23 RX ADMIN — FAMOTIDINE 40 MG: 20 TABLET ORAL at 20:00

## 2024-04-23 RX ADMIN — SIMVASTATIN 40 MG: 40 TABLET, FILM COATED ORAL at 00:18

## 2024-04-23 RX ADMIN — HYDRALAZINE HYDROCHLORIDE 10 MG: 20 INJECTION INTRAMUSCULAR; INTRAVENOUS at 22:17

## 2024-04-23 ASSESSMENT — ACTIVITIES OF DAILY LIVING (ADL)
ADLS_ACUITY_SCORE: 45
ADLS_ACUITY_SCORE: 39
ADLS_ACUITY_SCORE: 46
ADLS_ACUITY_SCORE: 39
ADLS_ACUITY_SCORE: 43
ADLS_ACUITY_SCORE: 43
ADLS_ACUITY_SCORE: 39
ADLS_ACUITY_SCORE: 43
ADLS_ACUITY_SCORE: 39
ADLS_ACUITY_SCORE: 39
ADLS_ACUITY_SCORE: 35
ADLS_ACUITY_SCORE: 39
ADLS_ACUITY_SCORE: 46
ADLS_ACUITY_SCORE: 46
DEPENDENT_IADLS:: INDEPENDENT
ADLS_ACUITY_SCORE: 45
ADLS_ACUITY_SCORE: 45
ADLS_ACUITY_SCORE: 43
ADLS_ACUITY_SCORE: 43
ADLS_ACUITY_SCORE: 46
ADLS_ACUITY_SCORE: 43

## 2024-04-23 NOTE — PROGRESS NOTES
"PRIMARY DIAGNOSIS: \"GENERIC\" NURSING  OUTPATIENT/OBSERVATION GOALS TO BE MET BEFORE DISCHARGE:  ADLs back to baseline: No    Activity and level of assistance: Up with maximum assistance. Consider SW and/or PT evaluation. PT/OT consult in place.     Pain status: Pain free.    Return to near baseline physical activity: No     Discharge Planner Nurse   Safe discharge environment identified:  Unsure if able to go home- may need higher level of care.   Barriers to discharge: Yes- PT/OT consult. SW/CM consult. Lymphedema and WOC consult in place.        Entered by: Sue Gould RN 04/23/2024 3:49 AM     Please review provider order for any additional goals.   Nurse to notify provider when observation goals have been met and patient is ready for discharge.  "

## 2024-04-23 NOTE — DISCHARGE INSTRUCTIONS
Wound Care Instructions    R shin area  Daily (may do on same frequency as lymphedema therapy if less frequent than daily)    Cleanse with Vashe (moisten towel with Vashe and wrap around leg for 5 - 10 minutes); gently dry.  2.   Apply Critic Aid to intact skin. Cover it with an Abdominal pad to contain drainage.

## 2024-04-23 NOTE — CONSULTS
"  [facility]    Stroke Telephone Note    I was called by Komal Chin on 04/22/24 regarding patient Irene Pena. The patient is a 78 year old woman with b/l LE lymphedema who presented to the ED for altered mental status. Per report, patient has been having confusion and trouble walking. Exam at ED concerning for b/l dysmetria but no other focal lateralizing neuro deficits. Work up in ED shows e/o UTI.    Vitals  BP: (!) 194/76   Pulse: 79   Resp: (!) 31   Temp: 98.3  F (36.8  C)   Weight: 79.4 kg (175 lb)    Imaging Findings  MRI Brain: no acute ischemia/hemorrhage  CTA head/neck: No LVO. There is severe stenosis distal right V4 segment, multiple severe stenoses of the right P2 segment, moderate stenosis left P1 segment     Impression  Encephalopathy  UTI  ICAD in posterior circulation    MRI with no evidence of stroke.    Recommendations  No further stroke work up needed. Continue Aspirin.    My recommendations are based on the information provided over the phone by Irene Pena's in-person providers. They are not intended to replace the clinical judgment of her in-person providers. I was not requested to personally see or examine the patient at this time.     The Stroke Staff is Dr. Watson.    Rebeca Luna MD  Vascular Neurology Fellow    To page me or covering stroke neurology team member, click here: [facility]  Choose \"On Call\" tab at top, then select \"NEUROLOGY/ALL SITES\" from middle drop-down box, press Enter, then look for \"stroke\" or \"telestroke\" for your site.   "

## 2024-04-23 NOTE — PROGRESS NOTES
"Occupational Therapy     04/23/24 1050   Appointment Info   Signing Clinician's Name / Credentials (OT) Heavenly Goncalves OTR/L   Living Environment   People in Home spouse   Current Living Arrangements house   Home Accessibility stairs to enter home;stairs within home   Number of Stairs, Main Entrance 5;6   Stair Railings, Main Entrance railings safe and in good condition   Number of Stairs, Within Home, Primary greater than 10 stairs   Stair Railings, Within Home, Primary railings safe and in good condition   Living Environment Comments pt unable to provide info-info obtained from chart   Self-Care   Usual Activity Tolerance moderate   Current Activity Tolerance poor   Equipment Currently Used at Home other (see comments)  (4WW)   Activity/Exercise/Self-Care Comment per chart, pt is independent w/ADLs @ baseline   General Information   Onset of Illness/Injury or Date of Surgery 04/22/24   Referring Physician Adelina Walker MD   Patient/Family Therapy Goal Statement (OT) none stated   Existing Precautions/Restrictions fall   General Observations and Info Per chart:  \"78 year old female PMH of HTN, HLD, GERD, hypothyroidism, chronic lymphedema, venous stasis, admitted on 4/22/2024 with:     #Acute toxic metabolic encephalopathy, likely due to UTI.\"   Cognitive Status Examination   Orientation Status person  (could recall the yr only)   Affect/Mental Status (Cognitive) confused  (appears tired; groggy but agreeable to participate.)   Follows Commands follows one-step commands;repetition of directions required;verbal cues/prompting required   Cognitive Status Comments difficulty attending/concentratiing on directions/commands   Pain Assessment   Patient Currently in Pain No   Posture   Posture not impaired   Range of Motion Comprehensive   Comment, General Range of Motion slightly decreased L shldr flex; otherwise ROM WFL   Strength Comprehensive (MMT)   Comment, General Manual Muscle Testing (MMT) Assessment demo " generalized weakness throughout; fatigues easily   Coordination   Upper Extremity Coordination No deficits were identified   Transfers   Transfer Comments mod A from recliner   Balance   Balance Comments leaning slightly to her R side in sitting; min A standing balance w/FWW   Activities of Daily Living   BADL Assessment/Intervention lower body dressing;toileting   Lower Body Dressing Assessment/Training   Comment, (Lower Body Dressing) dependent w/socks  (R LE wound/lymphedema ordered)   Toileting   Comment, (Toileting) max A to manage toileting; some confusion w/task   Clinical Impression   Criteria for Skilled Therapeutic Interventions Met (OT) Yes, treatment indicated   OT Diagnosis decreased ADL activity tolerance   OT Problem List-Impairments impacting ADL activity tolerance impaired;balance;cognition;strength;mobility;inability to direct their own care   Assessment of Occupational Performance 5 or more Performance Deficits   Identified Performance Deficits impaired cognition, trfs, decreased balance, toileting, LB drsg, impaired balance   Planned Therapy Interventions (OT) ADL retraining;balance training;cognition;transfer training;strengthening;progressive activity/exercise   Clinical Decision Making Complexity (OT) detailed assessment/moderate complexity   Risk & Benefits of therapy have been explained evaluation/treatment results reviewed;care plan/treatment goals reviewed;patient   Clinical Impression Comments Pt presents w/impaired cognition, weakness & decreased activity tolerance impacting her ability to perform all ADLs & trfs.   OT Total Evaluation Time   OT Eval, Moderate Complexity Minutes (97516) 12   OT Goals   Therapy Frequency (OT) Daily   OT Predicted Duration/Target Date for Goal Attainment 04/30/24   OT Goals Cognition;Hygiene/Grooming;OT Goal 1;Toilet Transfer/Toileting;Lower Body Dressing   OT: Hygiene/Grooming supervision/stand-by assist  (in sitting; @ least 3 GH tasks)   OT: Lower Body  Dressing Supervision/stand-by assist  (don/doff socks)   OT: Toilet Transfer/Toileting Minimal assist   OT: Cognitive Patient/caregiver will verbalize understanding of cognitive assessment results/recommendations as needed for safe discharge planning   OT: Goal 1 Pt to demo @ least 5 minutes standing tolerance w/CGA in prep ADLs   Interventions   Interventions Quick Adds Therapeutic Activity   Self-Care/Home Management   Self-Care/Home Mgmt/ADL, Compensatory, Meal Prep Minutes (64471) 8   Symptoms Noted During/After Treatment (Meal Preparation/Planning Training) fatigue   Treatment Detail/Skilled Intervention Pt needing step by step instructions; needing repeated cues & frequent redirection. Seated attempts @ GH-mod A to brush hair-needing cues/A for full completion. SBA to wash her face w/incr cues & time allowed in order to complete.   Therapeutic Activities   Therapeutic Activity Minutes (81011) 15   Symptoms noted during/after treatment fatigue   Treatment Detail/Skilled Intervention Additional static standing durations CGA/min A using FWW. Functional mob 6-8 steps x2 trials min/mod A w/FWW-very slow, small shuffling steps; unsteadiness.   OT Discharge Planning   OT Plan monitor cogn/following directions, trfs, standing nayely, seated GH   OT Discharge Recommendation (DC Rec) Transitional Care Facility   OT Rationale for DC Rec At this time, pt limited w/activity tolerance; requiring 24 hr A of 1 for all ADLs & trfs. Pt is functioning significantly below her baseline.   OT Brief overview of current status mod A trfs, dependent LB drsg, max A toileting   Total Session Time   Timed Code Treatment Minutes 23   Total Session Time (sum of timed and untimed services) 35

## 2024-04-23 NOTE — PROGRESS NOTES
[facility]    Medicine Progress Note - Hospitalist Service    Date of Admission:  4/22/2024    Assessment & Plan      Irene Pena is a 78 year old female PMH of HTN, HLD, GERD, hypothyroidism, chronic lymphedema, venous stasis with increased confusion and difficulty walking.  Patient is now alert and oriented.  Patient states she feels well and has no concerns or symptoms at this time.  PT OT assessed recommend TCU placement.  Case management to coordinate.  Patient has right leg redness swelling IV ceftriaxone.  WOC consult recommendations in place.  Patient and family agree with plan to transfer to TCU and are awaiting TCU placement.     #Acute toxic metabolic encephalopathy, likely due to UTI.  Workup in ED negative for stroke, hyperammonemia, MI.  #UTI, nonseptic.  - Follow UC/BC  - IV ceftriaxone  - IVF  MRI HEAD:Severe stenosis distal right V4 segment.  Multiple severe stenoses of the right P2 segment.  Moderate stenosis left P1 segment.  MRI NECK:Severe stenosis origin of the right vertebral artery. No hemodynamically significant stenosis in the internal carotid arteries or left vertebral artery  Neurology consultedCheck lipid panel and continue home dose of simvastatin 40 mg daily with goal of LDL less than 70. Continue aspirin 81 mg daily.Treatment of UTI per hospitalist.Nothing more to add from neurology standpoint, please call if any questions    #Right leg wound.  Probable right leg cellulitis.  Has been seen and wound clinic since February 2024.  #Lymphedema, venous insufficiency.  - IV ceftriaxone  -WOC:R shin area - Daily (may do on same frequency as lymphedema therapy if less frequent than daily)Cleanse with Vashe (moisten towel with Vashe and wrap around leg for 5 - 10 minutes); gently dry.Apply Critic Aid to intact skin.ABD pad to contain drainage.  - lymphedema consult recommendations pending  - Tylenol,tramadol for pain     #GERD-on famotidine,  "Protonix.  #Hypothyroidism-on levothyroxine.  # HTN-on losartan.  #HLD-on simvastatin.       Observation Goals: -diagnostic tests and consults completed and resulted, -vital signs normal or at patient baseline, -tolerating oral intake to maintain hydration, -adequate pain control on oral analgesics, -tolerating oral antibiotics or has plans for home infusion setup, -dyspnea improved and O2 sats greater than 88% on room air or prior home oxygen levels, -returns to baseline functional status, -safe disposition plan has been identified, Nurse to notify provider when observation goals have been met and patient is ready for discharge.  Diet: Combination Diet Low Saturated Fat Na <2400mg Diet    DVT Prophylaxis: Enoxaparin (Lovenox) SQ  Carlin Catheter: Not present  Lines: None     Cardiac Monitoring: ACTIVE order. Indication: QTc prolonging medication (48 hours)  Code Status: Full Code      Clinically Significant Risk Factors Present on Admission                # Drug Induced Platelet Defect: home medication list includes an antiplatelet medication   # Hypertension: Noted on problem list      # Obesity: Estimated body mass index is 31.03 kg/m  as calculated from the following:    Height as of this encounter: 1.626 m (5' 4\").    Weight as of this encounter: 82 kg (180 lb 12.4 oz).       # Financial/Environmental Concerns: none         Disposition Plan     Medically Ready for Discharge: Anticipated in 2-4 Days       The patient's care was discussed with the Attending Physician, Dr. Fermin .    Nevaeh Melchor NP  Hospitalist Service  [facility]  Securely message with [employer] (more info)  Text page via [employer] Paging/Directory   ______________________________________________________________________    Interval History   Right lower leg wound- cellulitis  IV Ceftriaxone  WOC to eval and treat right lower leg wound:R shin area - Daily (may do on same frequency as lymphedema therapy if less frequent than " daily)Cleanse with Vashe (moisten towel with Vashe and wrap around leg for 5 - 10 minutes); gently dry.Apply Critic Aid to intact skin.ABD pad to contain drainage.  PT/OT recommendations TCU placement case management to coordinate  MRI of head:Severe stenosis distal right V4 segment.  Multiple severe stenoses of the right P2 segment.  Moderate stenosis left P1 segment.   MRI Neck:Severe stenosis origin of the right vertebral artery. No hemodynamically significant stenosis in the internal carotid arteries or left vertebral artery  Neurology consulted: Recommendations pending  Abnormal urine results of culture pending  Patient has no urinary symptoms  Lymphedema consult recommendations pending    Physical Exam   Vital Signs: Temp: 98.1  F (36.7  C) Temp src: Oral BP: (!) 187/80 Pulse: 69   Resp: 20 SpO2: 96 % O2 Device: None (Room air)    Weight: 180 lbs 12.44 oz    Constitutional: awake, alert, cooperative, no apparent distress, and appears stated age  Hematologic / Lymphatic: no cervical lymphadenopathy and no supraclavicular lymphadenopathy  Respiratory: No increased work of breathing, good air exchange, clear to auscultation bilaterally, no crackles or wheezing  Cardiovascular: Normal apical impulse, regular rate and rhythm, normal S1 and S2, no S3 or S4, and no murmur noted +2 bilateral lower extremity edema  GI: No scars, normal bowel sounds, soft, non-distended, non-tender, no masses palpated, no hepatosplenomegally  Skin: no bruising or bleeding, no rashes, and no lesions.  Right lower leg red warm swollen with large ulceration on right lower leg see WOC note for images  Musculoskeletal: There is no redness, warmth, or swelling of the joints.  Full range of motion noted.  Motor strength is 5 out of 5 all extremities bilaterally.  Tone is normal.  Neurologic: Awake, alert, oriented to name, place and time.    Neuropsychiatric: General: normal, calm, and normal eye contact    Medical Decision Making       50  MINUTES SPENT BY ME on the date of service doing chart review, history, exam, documentation & further activities per the note.      Data     I have personally reviewed the following data over the past 24 hrs:    8.2  \   13.5   / 230     143 106 16.2 /  106 (H)   4.0 26 0.92 \     ALT: 28 AST: 45 AP: 77 TBILI: 0.4   ALB: 3.7 TOT PROTEIN: 6.9 LIPASE: N/A     Trop: 13 BNP: N/A     TSH: 6.28 (H) T4: N/A A1C: N/A     Procal: 0.08 CRP: N/A Lactic Acid: N/A         Imaging results reviewed over the past 24 hrs:   Recent Results (from the past 24 hour(s))   MR Brain w/o & w Contrast    Narrative    EXAM: MR BRAIN W/O and W CONTRAST, MRA NECK (CAROTIDS) W/O and W CONTRAST, MRA BRAIN ([place]) W/O CONTRAST  LOCATION: [facility]  DATE: 4/22/2024    INDICATION: hypertension, difficulty walking, confusion  COMPARISON: None.  CONTRAST: 10ml gadavist  TECHNIQUE:   1) Routine multiplanar multisequence head MRI without and with intravenous contrast.  2) 3D time-of-flight head MRA without intravenous contrast.  3) Neck MRA without and with IV contrast. Stenosis measurements made according to NASCET criteria unless otherwise specified.    FINDINGS:  HEAD MRI:  INTRACRANIAL CONTENTS: No acute or subacute infarct. No mass, acute hemorrhage, or extra-axial fluid collections. Patchy nonspecific T2/FLAIR hyperintensities within the cerebral white matter most consistent with mild to moderate chronic microvascular   ischemic change. Mild to moderate generalized cerebral atrophy. No hydrocephalus. Normal position of the cerebellar tonsils. No pathologic contrast enhancement.    SELLA: No abnormality accounting for technique.    OSSEOUS STRUCTURES/SOFT TISSUES: Normal marrow signal. The major intracranial vascular flow voids are maintained.     ORBITS: Prior bilateral cataract surgery. Visualized portions of the orbits are otherwise unremarkable.     SINUSES/MASTOIDS: Mild mucosal thickening primarily  involving the ethmoid air cells. No middle ear or mastoid effusion.     HEAD MRA:   ANTERIOR CIRCULATION: No severe stenosis/occlusion, aneurysm, or high flow vascular malformation. Standard [place] anatomy.    POSTERIOR CIRCULATION: Severe stenosis distal right V4 segment. Multiple severe stenoses are present in the right P2 segment. Moderate stenosis left P1 segment. No large vessel occlusion or aneurysm.     NECK MRA:   RIGHT CAROTID: No measurable stenosis or dissection.    LEFT CAROTID: No measurable stenosis or dissection.    VERTEBRAL ARTERIES: The bilateral vertebral arteries appear somewhat irregular on postcontrast images, left greater than right. Normal appearance of the vertebral arteries on the 2-D time-of-flight sequences. Findings are favored to be artifactual. There   is no high-grade stenosis within the left vertebral artery. Severe stenosis origin of the right vertebral artery. The appearance of a moderate to severe stenosis in the region of the distal right V1/proximal right V2 segment is not seen on the 2-D   time-of-flight images and is favored to be artifactual given degraded appearance of the vertebral arteries and postcontrast imaging     AORTIC ARCH: Classic aortic arch anatomy with no significant stenosis at the origin of the great vessels.      Impression    IMPRESSION:  HEAD MRI:   1.  No acute or subacute ischemic change.  2.  No acute intracranial process or abnormal enhancement.  3.  Generalized brain atrophy and presumed microvascular ischemic changes as detailed above.    HEAD MRA:   1.  Severe stenosis distal right V4 segment.  2.  Multiple severe stenoses of the right P2 segment.  3.  Moderate stenosis left P1 segment.  4.  No large vessel occlusion or aneurysm.    NECK MRA:  1.  Severe stenosis origin of the right vertebral artery.  2.  No hemodynamically significant stenosis in the internal carotid arteries or left vertebral artery.   MRA Brain ([place]) wo  Contrast    Narrative    EXAM: MR BRAIN W/O and W CONTRAST, MRA NECK (CAROTIDS) W/O and W CONTRAST, MRA BRAIN ([place]) W/O CONTRAST  LOCATION: [facility]  DATE: 4/22/2024    INDICATION: hypertension, difficulty walking, confusion  COMPARISON: None.  CONTRAST: 10ml gadavist  TECHNIQUE:   1) Routine multiplanar multisequence head MRI without and with intravenous contrast.  2) 3D time-of-flight head MRA without intravenous contrast.  3) Neck MRA without and with IV contrast. Stenosis measurements made according to NASCET criteria unless otherwise specified.    FINDINGS:  HEAD MRI:  INTRACRANIAL CONTENTS: No acute or subacute infarct. No mass, acute hemorrhage, or extra-axial fluid collections. Patchy nonspecific T2/FLAIR hyperintensities within the cerebral white matter most consistent with mild to moderate chronic microvascular   ischemic change. Mild to moderate generalized cerebral atrophy. No hydrocephalus. Normal position of the cerebellar tonsils. No pathologic contrast enhancement.    SELLA: No abnormality accounting for technique.    OSSEOUS STRUCTURES/SOFT TISSUES: Normal marrow signal. The major intracranial vascular flow voids are maintained.     ORBITS: Prior bilateral cataract surgery. Visualized portions of the orbits are otherwise unremarkable.     SINUSES/MASTOIDS: Mild mucosal thickening primarily involving the ethmoid air cells. No middle ear or mastoid effusion.     HEAD MRA:   ANTERIOR CIRCULATION: No severe stenosis/occlusion, aneurysm, or high flow vascular malformation. Standard [place] anatomy.    POSTERIOR CIRCULATION: Severe stenosis distal right V4 segment. Multiple severe stenoses are present in the right P2 segment. Moderate stenosis left P1 segment. No large vessel occlusion or aneurysm.     NECK MRA:   RIGHT CAROTID: No measurable stenosis or dissection.    LEFT CAROTID: No measurable stenosis or dissection.    VERTEBRAL ARTERIES: The bilateral  vertebral arteries appear somewhat irregular on postcontrast images, left greater than right. Normal appearance of the vertebral arteries on the 2-D time-of-flight sequences. Findings are favored to be artifactual. There   is no high-grade stenosis within the left vertebral artery. Severe stenosis origin of the right vertebral artery. The appearance of a moderate to severe stenosis in the region of the distal right V1/proximal right V2 segment is not seen on the 2-D   time-of-flight images and is favored to be artifactual given degraded appearance of the vertebral arteries and postcontrast imaging     AORTIC ARCH: Classic aortic arch anatomy with no significant stenosis at the origin of the great vessels.      Impression    IMPRESSION:  HEAD MRI:   1.  No acute or subacute ischemic change.  2.  No acute intracranial process or abnormal enhancement.  3.  Generalized brain atrophy and presumed microvascular ischemic changes as detailed above.    HEAD MRA:   1.  Severe stenosis distal right V4 segment.  2.  Multiple severe stenoses of the right P2 segment.  3.  Moderate stenosis left P1 segment.  4.  No large vessel occlusion or aneurysm.    NECK MRA:  1.  Severe stenosis origin of the right vertebral artery.  2.  No hemodynamically significant stenosis in the internal carotid arteries or left vertebral artery.   MRA Neck ([place]) wo & w Contrast    Narrative    EXAM: MR BRAIN W/O and W CONTRAST, MRA NECK (CAROTIDS) W/O and W CONTRAST, MRA BRAIN ([place]) W/O CONTRAST  LOCATION: [facility]  DATE: 4/22/2024    INDICATION: hypertension, difficulty walking, confusion  COMPARISON: None.  CONTRAST: 10ml gadavist  TECHNIQUE:   1) Routine multiplanar multisequence head MRI without and with intravenous contrast.  2) 3D time-of-flight head MRA without intravenous contrast.  3) Neck MRA without and with IV contrast. Stenosis measurements made according to NASCET criteria unless otherwise  specified.    FINDINGS:  HEAD MRI:  INTRACRANIAL CONTENTS: No acute or subacute infarct. No mass, acute hemorrhage, or extra-axial fluid collections. Patchy nonspecific T2/FLAIR hyperintensities within the cerebral white matter most consistent with mild to moderate chronic microvascular   ischemic change. Mild to moderate generalized cerebral atrophy. No hydrocephalus. Normal position of the cerebellar tonsils. No pathologic contrast enhancement.    SELLA: No abnormality accounting for technique.    OSSEOUS STRUCTURES/SOFT TISSUES: Normal marrow signal. The major intracranial vascular flow voids are maintained.     ORBITS: Prior bilateral cataract surgery. Visualized portions of the orbits are otherwise unremarkable.     SINUSES/MASTOIDS: Mild mucosal thickening primarily involving the ethmoid air cells. No middle ear or mastoid effusion.     HEAD MRA:   ANTERIOR CIRCULATION: No severe stenosis/occlusion, aneurysm, or high flow vascular malformation. Standard [place] anatomy.    POSTERIOR CIRCULATION: Severe stenosis distal right V4 segment. Multiple severe stenoses are present in the right P2 segment. Moderate stenosis left P1 segment. No large vessel occlusion or aneurysm.     NECK MRA:   RIGHT CAROTID: No measurable stenosis or dissection.    LEFT CAROTID: No measurable stenosis or dissection.    VERTEBRAL ARTERIES: The bilateral vertebral arteries appear somewhat irregular on postcontrast images, left greater than right. Normal appearance of the vertebral arteries on the 2-D time-of-flight sequences. Findings are favored to be artifactual. There   is no high-grade stenosis within the left vertebral artery. Severe stenosis origin of the right vertebral artery. The appearance of a moderate to severe stenosis in the region of the distal right V1/proximal right V2 segment is not seen on the 2-D   time-of-flight images and is favored to be artifactual given degraded appearance of the vertebral arteries and  postcontrast imaging     AORTIC ARCH: Classic aortic arch anatomy with no significant stenosis at the origin of the great vessels.      Impression    IMPRESSION:  HEAD MRI:   1.  No acute or subacute ischemic change.  2.  No acute intracranial process or abnormal enhancement.  3.  Generalized brain atrophy and presumed microvascular ischemic changes as detailed above.    HEAD MRA:   1.  Severe stenosis distal right V4 segment.  2.  Multiple severe stenoses of the right P2 segment.  3.  Moderate stenosis left P1 segment.  4.  No large vessel occlusion or aneurysm.    NECK MRA:  1.  Severe stenosis origin of the right vertebral artery.  2.  No hemodynamically significant stenosis in the internal carotid arteries or left vertebral artery.

## 2024-04-23 NOTE — PROGRESS NOTES
04/23/24 0835   Appointment Info   Signing Clinician's Name / Credentials (PT) Usha Jordan DPT   Quick Adds   Quick Adds Certification   Living Environment   People in Home spouse   Current Living Arrangements house   Home Accessibility stairs to enter home;stairs within home   Number of Stairs, Main Entrance 5;6   Stair Railings, Main Entrance railings safe and in good condition   Number of Stairs, Within Home, Primary greater than 10 stairs  (bed/bathroom upstairs, laundry in basement)   Stair Railings, Within Home, Primary railings safe and in good condition   Transportation Anticipated health plan transportation   Self-Care   Usual Activity Tolerance moderate   Current Activity Tolerance poor   Equipment Currently Used at Home walker, rolling  (4WW)   General Information   Onset of Illness/Injury or Date of Surgery 04/22/24   Referring Physician Adelina Walker MD   Patient/Family Therapy Goals Statement (PT) none   Pertinent History of Current Problem (include personal factors and/or comorbidities that impact the POC) 78 year old female PMH of HTN, HLD, GERD, hypothyroidism, chronic lymphedema, venous stasis, admitted on 4/22/2024 with: Acute toxic metabolic encephalopathy, likely due to UTI.  UTI. Right leg wound. Probable right leg cellulitis.   Existing Precautions/Restrictions fall   Strength (Manual Muscle Testing)   Strength (Manual Muscle Testing) Deficits observed during functional mobility   Bed Mobility   Bed Mobility supine-sit   Supine-Sit [place] (Bed Mobility) minimum assist (75% patient effort)   Bed Mobility Limitations decreased ability to use arms for pushing/pulling;decreased ability to use legs for bridging/pushing;impaired ability to control trunk for mobility;cognitive deficits   Impairments Contributing to Impaired Bed Mobility decreased strength   Assistive Device (Bed Mobility) bed rails   Transfers   Transfers sit-stand transfer   Sit-Stand Transfer   Sit-Stand  [place] (Transfers) moderate assist (50% patient effort);1 person assist   Assistive Device (Sit-Stand Transfers) walker, front-wheeled   Gait/Stairs (Locomotion)   [place] Level (Gait) moderate assist (50% patient effort);1 person assist   Assistive Device (Gait) walker, front-wheeled   Distance in Feet (Gait) 4-5 steps  (Bed > commode)   Pattern (Gait) step-to   Deviations/Abnormal Patterns (Gait) gait speed decreased   Clinical Impression   Criteria for Skilled Therapeutic Intervention Yes, treatment indicated   PT Diagnosis (PT) Impaired functional mobility   Influenced by the following impairments Weakness, fatigue, cognition   Functional limitations due to impairments Impaired strength, transfers, gait   Clinical Presentation (PT Evaluation Complexity) evolving   Clinical Presentation Rationale Presents as diagnosed   Clinical Decision Making (Complexity) moderate complexity   Planned Therapy Interventions (PT) balance training;bed mobility training;gait training;home exercise program;stair training;strengthening;transfer training   Risk & Benefits of therapy have been explained patient   PT Total Evaluation Time   PT Eval, Moderate Complexity Minutes (33798) 10   Therapy Certification   Start of care date 04/23/24   Certification date from 04/23/24   Certification date to 04/30/24   Medical Diagnosis UTI   Physical Therapy Goals   PT Frequency 5x/week   PT Predicted Duration/Target Date for Goal Attainment 04/30/24   PT Goals Bed Mobility;Transfers;Gait;Stairs   PT: Bed Mobility Supervision/stand-by assist;Supine to/from sit   PT: Transfers Sit to/from stand;Bed to/from chair;Assistive device  (CGA)   PT: Gait Minimal assist;Rolling walker;50 feet   PT: Stairs Moderate assist;6 stairs   PT Discharge Planning   PT Plan progress transfers, amb with chair follow, LE ex   PT Discharge Recommendation (DC Rec) Transitional Care Facility   PT Rationale for DC Rec Unable to ambulate functional distance at  this time.   PT Brief overview of current status Bed > commode > recliner, min-mod Ax1.   PT Equipment Needed at Discharge walker, rolling  (pt owns 4WW)   Total Session Time   Total Session Time (sum of timed and untimed services) 10       [facility]  OUTPATIENT PHYSICAL THERAPY EVALUATION  PLAN OF TREATMENT FOR OUTPATIENT REHABILITATION  (COMPLETE FOR INITIAL CLAIMS ONLY)  Patient's Last Name, First Name, M.I.  YOB: 1945  Irene Pena                        Provider's Name  [facility] Medical Record No.  [id number]                             Onset Date:  04/22/24   Start of Care Date:  (P) 04/23/24   Type:     _X_PT   ___OT   ___SLP Medical Diagnosis:  (P) UTI              PT Diagnosis:  Impaired functional mobility Visits from SOC:  1     See note for plan of treatment, functional goals and certification details    I CERTIFY THE NEED FOR THESE SERVICES FURNISHED UNDER        THIS PLAN OF TREATMENT AND WHILE UNDER MY CARE     (Physician co-signature of this document indicates review and certification of the therapy plan).                Usha Jordan, PT, DPT  4/23/2024

## 2024-04-23 NOTE — PROGRESS NOTES
Care Hours [id number]  OBS Shift Summary  Pt admitted to Observation for: UTI  Pt on Oxygen: No  Which Consults are ordered/pending: Neurology   Which Tests are pending: None  PT/OT ordered? Yes If yes what is recommendation: TCU  Which IV meds is pt receiving: Hydralazine IV given for SBP in 180's.    What is the discharge plan: TCU

## 2024-04-23 NOTE — PROGRESS NOTES
"PRIMARY DIAGNOSIS: \"GENERIC\" NURSING  OUTPATIENT/OBSERVATION GOALS TO BE MET BEFORE DISCHARGE:  ADLs back to baseline: No    Activity and level of assistance: Up with maximum assistance. Consider SW and/or PT evaluation. PT/OT consult in place.     Pain status: Pain free.    Return to near baseline physical activity: No     Discharge Planner Nurse   Safe discharge environment identified:  Unsure if able to go home- may need higher level of care.   Barriers to discharge: Yes- PT/OT consult. SW/CM consult. Pt also would benefit from Lymphedema and WOC consult.        Entered by: Sue Gould RN 04/23/2024 1:39 AM     Please review provider order for any additional goals.   Nurse to notify provider when observation goals have been met and patient is ready for discharge.  "

## 2024-04-23 NOTE — PROGRESS NOTES
Patient admitted to room 3 at approximately 0115 via cart from emergency room.  Reason for Admission: UTI  Report received from: ED  Patient was accompanied by Self.  Discharge transportation provided by:  Patient ambulated/transferred:  A2. air sander.  Patient is alert and orientated x 3.  Outpatient Observation education provided to: (patient, family, friend)  MDRO Education done if applicable (MRSA, VRE, etc)  Safety risks were identified during admission:  fall and skin.   Yellow risk/fall band applied:  Yes  Detailed Belongings:  Clothing, shoes, cell phone and .

## 2024-04-23 NOTE — PLAN OF CARE
"ASSUMED CARES: [id number]  STATUS: Pt admitted for UTI.   NEURO: A/ o x 3- Disoriented to time and date- stated \"2004\". RUE tremor- not new per pt.   VS: Elevated BP- improved post PRN Hydralazine given in ED.   ACTIVITY: A2.   PAIN: Denies  CARDIAC: Denies CP- Tele- NSR  RESP: Denies SOB  GI/: External Cath placed. No BM this shift.   DIET: Low fat Low Na  SKIN: Redness under bilat breasts. Abrasions on bilat shins- Ointment in place.   LDA'S: L PIV SL.   LABS: UC pending.   POC: Cont with POC. Plan for PT/OT consult. CM/SW consult. Lymphedema consult. WOC consult. Bed alarm on for pt safety. Call light within reach.     Nystatin powder needed for under bilat breasts.         "

## 2024-04-23 NOTE — CONSULTS
"Care Management Initial Consult    General Information  Assessment completed with: Patient,    Type of CM/SW Visit: Initial Assessment    Primary Care Provider verified and updated as needed: Yes   Readmission within the last 30 days: no previous admission in last 30 days      Reason for Consult: discharge planning  Advance Care Planning: Advance Care Planning Reviewed: present on chart          Communication Assessment  Patient's communication style: spoken language (English or Bilingual)    Hearing Difficulty or Deaf: no   Wear Glasses or Blind: no    Cognitive  Cognitive/Neuro/Behavioral: .WDL except  Level of Consciousness: alert  Arousal Level: opens eyes spontaneously  Orientation: disoriented to, time (Disoriented to date. stated \"2004\")  Mood/Behavior: cooperative, calm  Best Language: 0 - No aphasia  Speech: clear    Living Environment:   People in home: spouse     Current living Arrangements: house      Able to return to prior arrangements: yes       Family/Social Support:  Care provided by: self  Provides care for: no one, unable/limited ability to care for self  Marital Status:             Description of Support System: Supportive    Support Assessment: Adequate family and caregiver support    Current Resources:   Patient receiving home care services: No     Community Resources:    Equipment currently used at home: none  Supplies currently used at home: None    Employment/Financial:  Employment Status: retired        Financial Concerns: none   Referral to Financial Worker: No       Does the patient's insurance plan have a 3 day qualifying hospital stay waiver?  No    Lifestyle & Psychosocial Needs:  Social Determinants of Health     Food Insecurity: Not on file   Depression: Not on file   Housing Stability: Not on file   Tobacco Use: Low Risk  (4/22/2024)    Patient History     Smoking Tobacco Use: Never     Smokeless Tobacco Use: Never     Passive Exposure: Not on file   Financial Resource " Strain: Not on file   Alcohol Use: Not on file   Transportation Needs: Not on file   Physical Activity: Not on file   Interpersonal Safety: Not on file   Stress: Not on file   Social Connections: Unknown (4/24/2022)    Received [facility], [employer] [facility]    Social Connections     Frequency of Communication with Friends and Family: Not on file   Health Literacy: Not on file       Functional Status:  Prior to admission patient needed assistance:   Dependent ADLs:: Independent  Dependent IADLs:: Independent  Assesssment of Functional Status: Not at baseline with ADL Functioning    Mental Health Status:  Mental Health Status: No Current Concerns       Chemical Dependency Status:  Chemical Dependency Status: No Current Concerns             Values/Beliefs:  Spiritual, Cultural Beliefs, Shinto Practices, Values that affect care: no               Additional Information:  SW met with pt to introduce role of CM, complete  initial assessment, and to discuss needs at time of d/c. Pt comes from home; lives with spouse, and noted to be independent at baseline. Pt was sent in from lymphedema clinic due to confusion which is not pt's baseline. Therapy worked with pt and recommending TCU at discharge. Pt stated that she has never been to a facility before. She would be interested in going if covered by insurance and does not have preference in location; SW sent referral to [place] to run benefits and will continue to work with pt on safe discharge plan.    CM to continue to follow through hospitalization.  9:27 AM    Pt accepted to [facility] pending insurance auth; submitted and pending.  10:24 AM    Brenna Kjellberg, BSW LSW  4/23/2024

## 2024-04-23 NOTE — H&P
[facility]    History and Physical - Hospitalist Service       Date of Admission:  4/22/2024    Assessment & Plan      Irene Pena is a 78 year old female PMH of HTN, HLD, GERD, hypothyroidism, chronic lymphedema, venous stasis, admitted on 4/22/2024 with:    #Acute toxic metabolic encephalopathy, likely due to UTI.  Workup in ED negative for stroke, hyperammonemia, MI.  #UTI, nonseptic.  - Follow UC/BC  - Ceftriaxone  - IVF    #Right leg wound.  Probable right leg cellulitis.  Has been seen and wound clinic since February 2024.  #Lymphedema, venous insufficiency.  -Ceftriaxone  -WOC, lymphedema consult  - Tylenol,tramadol for pain     #GERD-on famotidine, Protonix.  #Hypothyroidism-on levothyroxine.  # HTN-on losartan.  #HLD-on simvastatin.    Diet: regular  DVT Prophylaxis: Enoxaparin (Lovenox) SQ  Carlin Catheter: Not present  Lines: None     Cardiac Monitoring: None  Code Status: full    Clinically Significant Risk Factors Present on Admission   # Drug Induced Platelet Defect: home medication list includes an antiplatelet medication   # Hypertension: Home medication list includes antihypertensive(s)            Disposition Plan   Medically Ready for Discharge: Anticipated in 2-4 Days    Adelina Walker MD  Hospitalist Service  [facility]  Securely message with [employer] (more info)  Text page via [employer] Paging/Directory   _____________________________________________________________________    Chief Complaint   Altered mental status, impaired gait    History is obtained from the patient, electronic health record, and emergency department physician    History of Present Illness   Irene Pena is a 78 year old female with PMH of lymphedema, chronic leg stasis, GERD, HLD, hypothyroidism, HTN, presented to ED with AMS.    Workup in ED negative for alcohol desiccation, hyperammonemia.  MRI/MRA of head and neck without acute intracranial pathology.  ED provider  D/W stroke neurology-no recommendations for further stroke workup.  UA infected.  Treated with ceftriaxone.    Past Medical History    History reviewed. No pertinent past medical history.    Past Surgical History   Past Surgical History:   Procedure Laterality Date    CHOLECYSTECTOMY      EYE SURGERY      STRIP VEIN Left 2006    rfa     Prior to Admission Medications   Prior to Admission Medications   Prescriptions Last Dose Informant Patient Reported? Taking?   NYAMYC 140746 UNIT/GM external powder   Yes No   SYNTHROID 125 MCG tablet   Yes Yes   Sig: Take 125 mcg by mouth daily   VANIQA 13.9 % cream   Yes No   Sig: [VANIQA 13.9 % CREAM]    aspirin 81 MG EC tablet Past Week at unknown  Yes Yes   Sig: [ASPIRIN 81 MG EC TABLET] Take 81 mg by mouth daily.   famotidine (PEPCID) 40 MG tablet Past Week at unknown  Yes Yes   Sig: Take 40 mg by mouth every evening   losartan (COZAAR) 100 MG tablet   Yes No   Sig: [LOSARTAN (COZAAR) 100 MG TABLET]    multivitamin (CENTRUM SILVER) tablet Past Week at unknown  Yes Yes   Sig: Take 1 tablet by mouth daily   naproxen sodium (ALEVE) 220 MG tablet   Yes No   Sig: [NAPROXEN SODIUM (ALEVE) 220 MG TABLET] Take 220 mg by mouth every 12 (twelve) hours as needed for pain.   Patient not taking: Reported on 1/25/2024   omeprazole (PRILOSEC) 20 MG capsule   Yes No   Sig: [OMEPRAZOLE (PRILOSEC) 20 MG CAPSULE]    Patient not taking: Reported on 1/25/2024   ranitidine (ZANTAC) 300 MG tablet   Yes No   Sig: [RANITIDINE (ZANTAC) 300 MG TABLET]    simvastatin (ZOCOR) 40 MG tablet   Yes No   Sig: [SIMVASTATIN (ZOCOR) 40 MG TABLET]    triamcinolone (KENALOG) 0.025 % cream   No No   Sig: Apply topically 2 times daily      Facility-Administered Medications: None     Review of Systems    The 10 point Review of Systems is negative other than noted in the HPI.    Social History   I have reviewed this patient's social history and updated it with pertinent information if needed.  Social History      Tobacco Use    Smoking status: Never    Smokeless tobacco: Never   Substance Use Topics    Alcohol use: Yes    Drug use: No     Family History   I have reviewed this patient's family history and updated it with pertinent information if needed.  Family History   Problem Relation Age of Onset    No Known Problems Mother     No Known Problems Father     No Known Problems Sister     No Known Problems Daughter     No Known Problems Maternal Grandmother     No Known Problems Maternal Grandfather     No Known Problems Paternal Grandmother     No Known Problems Paternal Grandfather     No Known Problems Maternal Aunt     No Known Problems Paternal Aunt     Hereditary Breast and Ovarian Cancer Syndrome No family hx of     Breast Cancer No family hx of     Cancer No family hx of     Colon Cancer No family hx of     Endometrial Cancer No family hx of     Ovarian Cancer No family hx of      Allergies   Allergies   Allergen Reactions    Amoxicillin Nausea and Vomiting    Lisinopril Cough    Latex Rash     Physical Exam   Vital Signs: Temp: 98.3  F (36.8  C) Temp src: Oral BP: (!) 185/110 Pulse: 80   Resp: (!) 31 SpO2: 95 %      Weight: 175 lbs 0 oz  General: Alert and oriented x 3. Not in obvious distress.  HEENT: NC, AT. Neck- supple, No JVP elevation, lymphadenopathy or thyromegaly. Trachea-central.  Chest: Clear to auscultation bilaterally.  Heart: S1S2 regular. No M/R/G.  Abdomen: Soft. NT, ND. No organomegaly. Bowel sounds- active.  Back: No spine tenderness. No CVA tenderness.  Extremities: Bilateral leg lymphedema, right lower leg warmth and hyperemia, 4 x 5 cm superficial wound, without drainage.  Peripheral pulses 1+ bilaterally.  Neuro: Cranial nerves 1-12 grossly normal. No focal neurological deficit  Medical Decision Making     76 MINUTES SPENT BY ME on the date of service doing chart review, history, exam, documentation & further activities per the note.      Data     I have personally reviewed the following  data over the past 24 hrs:    8.2  \   13.5   / 230     143 106 16.2 /  106 (H)   4.0 26 0.92 \     ALT: 28 AST: 45 AP: 77 TBILI: 0.4   ALB: 3.7 TOT PROTEIN: 6.9 LIPASE: N/A     Trop: 13 BNP: N/A       Imaging results reviewed over the past 24 hrs:   Recent Results (from the past 24 hour(s))   MR Brain w/o & w Contrast    Narrative    EXAM: MR BRAIN W/O and W CONTRAST, MRA NECK (CAROTIDS) W/O and W CONTRAST, MRA BRAIN ([place]) W/O CONTRAST  LOCATION: [facility]  DATE: 4/22/2024  INDICATION: hypertension, difficulty walking, confusion  COMPARISON: None.  CONTRAST: 10ml gadavist  TECHNIQUE:   1) Routine multiplanar multisequence head MRI without and with intravenous contrast.  2) 3D time-of-flight head MRA without intravenous contrast.  3) Neck MRA without and with IV contrast. Stenosis measurements made according to NASCET criteria unless otherwise specified.  FINDINGS:  HEAD MRI:  INTRACRANIAL CONTENTS: No acute or subacute infarct. No mass, acute hemorrhage, or extra-axial fluid collections. Patchy nonspecific T2/FLAIR hyperintensities within the cerebral white matter most consistent with mild to moderate chronic microvascular   ischemic change. Mild to moderate generalized cerebral atrophy. No hydrocephalus. Normal position of the cerebellar tonsils. No pathologic contrast enhancement.  SELLA: No abnormality accounting for technique.  OSSEOUS STRUCTURES/SOFT TISSUES: Normal marrow signal. The major intracranial vascular flow voids are maintained.   ORBITS: Prior bilateral cataract surgery. Visualized portions of the orbits are otherwise unremarkable.   SINUSES/MASTOIDS: Mild mucosal thickening primarily involving the ethmoid air cells. No middle ear or mastoid effusion.   HEAD MRA:   ANTERIOR CIRCULATION: No severe stenosis/occlusion, aneurysm, or high flow vascular malformation. Standard [place] anatomy.  POSTERIOR CIRCULATION: Severe stenosis distal right V4 segment.  Multiple severe stenoses are present in the right P2 segment. Moderate stenosis left P1 segment. No large vessel occlusion or aneurysm.   NECK MRA:   RIGHT CAROTID: No measurable stenosis or dissection.  LEFT CAROTID: No measurable stenosis or dissection.  VERTEBRAL ARTERIES: The bilateral vertebral arteries appear somewhat irregular on postcontrast images, left greater than right. Normal appearance of the vertebral arteries on the 2-D time-of-flight sequences. Findings are favored to be artifactual. There   is no high-grade stenosis within the left vertebral artery. Severe stenosis origin of the right vertebral artery. The appearance of a moderate to severe stenosis in the region of the distal right V1/proximal right V2 segment is not seen on the 2-D   time-of-flight images and is favored to be artifactual given degraded appearance of the vertebral arteries and postcontrast imaging   AORTIC ARCH: Classic aortic arch anatomy with no significant stenosis at the origin of the great vessels.      Impression    IMPRESSION:  HEAD MRI:   1.  No acute or subacute ischemic change.  2.  No acute intracranial process or abnormal enhancement.  3.  Generalized brain atrophy and presumed microvascular ischemic changes as detailed above.  HEAD MRA:   1.  Severe stenosis distal right V4 segment.  2.  Multiple severe stenoses of the right P2 segment.  3.  Moderate stenosis left P1 segment.  4.  No large vessel occlusion or aneurysm.  NECK MRA:  1.  Severe stenosis origin of the right vertebral artery.  2.  No hemodynamically significant stenosis in the internal carotid arteries or left vertebral artery.   MRA Brain ([place]) wo Contrast    Narrative    EXAM: MR BRAIN W/O and W CONTRAST, MRA NECK (CAROTIDS) W/O and W CONTRAST, MRA BRAIN ([place]) W/O CONTRAST  LOCATION: [facility]  DATE: 4/22/2024  INDICATION: hypertension, difficulty walking, confusion  COMPARISON: None.  CONTRAST: 10ml  gadavist  TECHNIQUE:   1) Routine multiplanar multisequence head MRI without and with intravenous contrast.  2) 3D time-of-flight head MRA without intravenous contrast.  3) Neck MRA without and with IV contrast. Stenosis measurements made according to NASCET criteria unless otherwise specified.  FINDINGS:  HEAD MRI:  INTRACRANIAL CONTENTS: No acute or subacute infarct. No mass, acute hemorrhage, or extra-axial fluid collections. Patchy nonspecific T2/FLAIR hyperintensities within the cerebral white matter most consistent with mild to moderate chronic microvascular   ischemic change. Mild to moderate generalized cerebral atrophy. No hydrocephalus. Normal position of the cerebellar tonsils. No pathologic contrast enhancement.  SELLA: No abnormality accounting for technique.  OSSEOUS STRUCTURES/SOFT TISSUES: Normal marrow signal. The major intracranial vascular flow voids are maintained.   ORBITS: Prior bilateral cataract surgery. Visualized portions of the orbits are otherwise unremarkable.   SINUSES/MASTOIDS: Mild mucosal thickening primarily involving the ethmoid air cells. No middle ear or mastoid effusion.   HEAD MRA:   ANTERIOR CIRCULATION: No severe stenosis/occlusion, aneurysm, or high flow vascular malformation. Standard [place] anatomy.  POSTERIOR CIRCULATION: Severe stenosis distal right V4 segment. Multiple severe stenoses are present in the right P2 segment. Moderate stenosis left P1 segment. No large vessel occlusion or aneurysm.   NECK MRA:   RIGHT CAROTID: No measurable stenosis or dissection.  LEFT CAROTID: No measurable stenosis or dissection.  VERTEBRAL ARTERIES: The bilateral vertebral arteries appear somewhat irregular on postcontrast images, left greater than right. Normal appearance of the vertebral arteries on the 2-D time-of-flight sequences. Findings are favored to be artifactual. There   is no high-grade stenosis within the left vertebral artery. Severe stenosis origin of the right  vertebral artery. The appearance of a moderate to severe stenosis in the region of the distal right V1/proximal right V2 segment is not seen on the 2-D   time-of-flight images and is favored to be artifactual given degraded appearance of the vertebral arteries and postcontrast imaging   AORTIC ARCH: Classic aortic arch anatomy with no significant stenosis at the origin of the great vessels.    Impression    IMPRESSION:  HEAD MRI:   1.  No acute or subacute ischemic change.  2.  No acute intracranial process or abnormal enhancement.  3.  Generalized brain atrophy and presumed microvascular ischemic changes as detailed above.  HEAD MRA:   1.  Severe stenosis distal right V4 segment.  2.  Multiple severe stenoses of the right P2 segment.  3.  Moderate stenosis left P1 segment.  4.  No large vessel occlusion or aneurysm.  NECK MRA:  1.  Severe stenosis origin of the right vertebral artery.  2.  No hemodynamically significant stenosis in the internal carotid arteries or left vertebral artery.   MRA Neck (Carot[place]) wo & w Contrast    Narrative    EXAM: MR BRAIN W/O and W CONTRAST, MRA NECK (CAROTIDS) W/O and W CONTRAST, MRA BRAIN ([place]) W/O CONTRAST  LOCATION: [facility]  DATE: 4/22/2024  INDICATION: hypertension, difficulty walking, confusion  COMPARISON: None.  CONTRAST: 10ml gadavist  TECHNIQUE:   1) Routine multiplanar multisequence head MRI without and with intravenous contrast.  2) 3D time-of-flight head MRA without intravenous contrast.  3) Neck MRA without and with IV contrast. Stenosis measurements made according to NASCET criteria unless otherwise specified.  FINDINGS:  HEAD MRI:  INTRACRANIAL CONTENTS: No acute or subacute infarct. No mass, acute hemorrhage, or extra-axial fluid collections. Patchy nonspecific T2/FLAIR hyperintensities within the cerebral white matter most consistent with mild to moderate chronic microvascular   ischemic change. Mild to moderate generalized  cerebral atrophy. No hydrocephalus. Normal position of the cerebellar tonsils. No pathologic contrast enhancement.  SELLA: No abnormality accounting for technique.  OSSEOUS STRUCTURES/SOFT TISSUES: Normal marrow signal. The major intracranial vascular flow voids are maintained.   ORBITS: Prior bilateral cataract surgery. Visualized portions of the orbits are otherwise unremarkable.   SINUSES/MASTOIDS: Mild mucosal thickening primarily involving the ethmoid air cells. No middle ear or mastoid effusion.   HEAD MRA:   ANTERIOR CIRCULATION: No severe stenosis/occlusion, aneurysm, or high flow vascular malformation. Standard [place] anatomy.  POSTERIOR CIRCULATION: Severe stenosis distal right V4 segment. Multiple severe stenoses are present in the right P2 segment. Moderate stenosis left P1 segment. No large vessel occlusion or aneurysm.   NECK MRA:   RIGHT CAROTID: No measurable stenosis or dissection.  LEFT CAROTID: No measurable stenosis or dissection.  VERTEBRAL ARTERIES: The bilateral vertebral arteries appear somewhat irregular on postcontrast images, left greater than right. Normal appearance of the vertebral arteries on the 2-D time-of-flight sequences. Findings are favored to be artifactual. There   is no high-grade stenosis within the left vertebral artery. Severe stenosis origin of the right vertebral artery. The appearance of a moderate to severe stenosis in the region of the distal right V1/proximal right V2 segment is not seen on the 2-D   time-of-flight images and is favored to be artifactual given degraded appearance of the vertebral arteries and postcontrast imaging   AORTIC ARCH: Classic aortic arch anatomy with no significant stenosis at the origin of the great vessels.    Impression    IMPRESSION:  HEAD MRI:   1.  No acute or subacute ischemic change.  2.  No acute intracranial process or abnormal enhancement.  3.  Generalized brain atrophy and presumed microvascular ischemic changes as  detailed above.  HEAD MRA:   1.  Severe stenosis distal right V4 segment.  2.  Multiple severe stenoses of the right P2 segment.  3.  Moderate stenosis left P1 segment.  4.  No large vessel occlusion or aneurysm.  NECK MRA:  1.  Severe stenosis origin of the right vertebral artery.  2.  No hemodynamically significant stenosis in the internal carotid arteries or left vertebral artery.

## 2024-04-23 NOTE — UTILIZATION REVIEW
Inpatient appropriate    Admission Status; Secondary Review Determination       Under the authority of the Utilization Management Committee, the utilization review process indicated a secondary review on the above patient. The review outcome is based on review of the medical records, discussions with staff, and applying clinical experience noted on the date of the review.     (x) Inpatient Status Appropriate - This patient's medical care is consistent with medical management for inpatient care and reasonable inpatient medical practice.     RATIONALE FOR DETERMINATION   78 year old female PMH of HTN, HLD, GERD, hypothyroidism, chronic lymphedema, venous stasis presented to emergency department with confusion.  Patient is admitted for acute toxic metabolic encephalopathy due to UTI.  Currently on IV antibiotic.  If discharge today, will leave obs status    At the time of admission with the information available to the attending physician more than 2 nights Hospital complex care was anticipated, based on patient risk of adverse outcome if treated as outpatient and complex care required. Inpatient admission is appropriate based on the Medicare guidelines.     The information on this document is developed by the utilization review team in order for the business office to ensure compliance. This only denotes the appropriateness of proper admission status and does not reflect the quality of care rendered.   The definitions of Inpatient Status and Observation Status used in making the determination above are those provided in the CMS Coverage Manual, Chapter 1 and Chapter 6, section 70.4.   Sincerely,   Deangelo Ward MD  Utilization Review  Physician Advisor  [facility].

## 2024-04-23 NOTE — CONSULTS
[facility]  WOC Nurse Inpatient Assessment     Consulted for: R shin    Summary: Patient follows with Shanell at [facility] in [place]    Patient History (according to provider note(s):    Assessment & Plan  Irene Pena is a 78 year old female PMH of HTN, HLD, GERD, hypothyroidism, chronic lymphedema, venous stasis, admitted on 4/22/2024 with:     #Acute toxic metabolic encephalopathy, likely due to UTI.  Workup in ED negative for stroke, hyperammonemia, MI.  #UTI, nonseptic.  - Follow UC/BC  - Ceftriaxone  - IVF     #Right leg wound.  Probable right leg cellulitis.  Has been seen and wound clinic since February 2024.  #Lymphedema, venous insufficiency.  -Ceftriaxone  -WOC, lymphedema consult  - Tylenol,tramadol for pain          #GERD-on famotidine, Protonix.  #Hypothyroidism-on levothyroxine.  # HTN-on losartan.  #HLD-on simvastatin.    Assessment:    Skin Injury Location: RLE    Last photo: 4/23/24  Skin injury due to: Venous stasis dermatitis and Venous ulcerations  Skin history and plan of care: OP plan of care - zinc oxide to intact skin and ABD over open areas  Affected area:      Skin assessment: Dry/flaky, Edema, Erythema, Hemosiderin staining, and Serous crusting     Measurements (length x width x depth, in cm) scattered areas of dried drainage, no drainage noted at this time  Pain:  Per patient, pain with manipulation , sharp  Pain interventions prior to dressing change: patient tolerated well and slow and gentle cares   Treatment goal: Drainage control, Maintain (prevention of deterioration), and Protection  STATUS: initial assessment this admission  Supplies ordered: ordered Vashe, Critic Aid      Treatment Plan:   R shin area - Daily (may do on same frequency as lymphedema therapy if less frequent than daily)  Cleanse with Vashe (moisten towel with Vashe and wrap around leg for 5 - 10 minutes); gently dry.  Apply Critic Aid to intact skin.  ABD pad to  contain drainage.    Orders: Written    RECOMMEND PRIMARY TEAM ORDER: None, at this time (Lymphedema therapy already ordered)  Education provided: plan of care  Discussed plan of care with: Patient  WOC nurse follow-up plan: weekly  Notify WOC if wound(s) deteriorate.  Nursing to notify the Provider(s) and re-consult the WOC Nurse if new skin concern.    DATA:     Current support surface: Standard  Standard gel/foam mattress (IsoFlex, Atmos air, etc)  Containment of urine/stool: Incontinence Protocol  BMI: Body mass index is 31.03 kg/m .   Active diet order: Orders Placed This Encounter      Combination Diet Low Saturated Fat Na <2400mg Diet     Output: I/O last 3 completed shifts:  In: 100 [IV Piggyback:100]  Out: 350 [Urine:350]     Labs:   Recent Labs   Lab 04/22/24  1740   ALBUMIN 3.7   HGB 13.5   WBC 8.2     Pressure injury risk assessment:   Sensory Perception: 3-->slightly limited  Moisture: 4-->rarely moist  Activity: 2-->chairfast  Mobility: 3-->slightly limited  Nutrition: 3-->adequate  Friction and Shear: 2-->potential problem  Rashad Score: 17    Skye Santiago, MSN RN CWOCN  Pager no longer is use, please contact through [employer] group: [facility] [employer]

## 2024-04-23 NOTE — CONSULTS
NEUROLOGY INPATIENT CONSULTATION NOTE       [facility]  [address] [place] [address]  Tel: [phone]  Fax: [phone]  [email or web address]     Irene Pena,  1945, MRN [id number]  PCP: Ronn Coleman  Date: 2024     ASSESSMENT & PLAN     Diagnosis code: Altered mental status    Altered mental status  78-year-old female with HTN, HLD, GERD, hypothyroidism and chronic lymphedema admitted with progressive confusion.  MRI of the head shows age-related atrophy but no acute infarct  MRA head and neck shows diffuse intracranial atherosclerotic disease  Check lipid panel and continue home dose of simvastatin 40 mg daily with goal of LDL less than 70  Continue aspirin 81 mg daily  Treatment of UTI per hospitalist  Nothing more to add from neurology standpoint, please call if any questions    Thank you again for this referral, please feel free to contact me if you have any questions.    Cesar Pichardo MD  [facility]     CHIEF COMPLAINT Urinary tract infection     HISTORY OF PRESENT ILLNESS     We have been requested by Dr. Fermin to evaluate Irene Pena who is a 78 year old  female for altered mental status    Patient is a 78-year-old female with history of HTN, HLD, GERD, hypothyroidism, chronic lymphedema, venous stasis who presented to the emergency room with altered mental status.  According to history patient had steady decline in her mental status and initially she was noted to have some dysmetria on her exam and a stroke code was called.  She had a MRA head and neck that did not show any large vessel occlusion.  Severe stenosis of the right V4 segment was noted and the severe stenosis of the right P2 segment and left P1.  MRI brain did not show any acute ischemia.  Lab work in the ER included normal ammonia, alcohol level, CBC but an abnormal UA and the patient was started on IV ceftriaxone neurology was  "consulted for further management     PROBLEM LIST      Patient Active Problem List   Diagnosis    Secondary lymphedema    Leg wound, left    Lymphedema    Confusion    Unsteady gait    Urinary tract infection    Primary hypertension    Impaired skin integrity      Clinically Significant Risk Factors Present on Admission                  # Drug Induced Platelet Defect: home medication list includes an antiplatelet medication       # Hypertension: Noted on problem list        # Obesity: Estimated body mass index is 31.03 kg/m  as calculated from the following:    Height as of this encounter: 1.626 m (5' 4\").    Weight as of this encounter: 82 kg (180 lb 12.4 oz).         # Financial/Environmental Concerns: none       PAST MEDICAL & SURGICAL HISTORY     Past Medical History: Patient  has no past medical history on file.    Past Surgical History: She  has a past surgical history that includes Strip vein (Left, 2006); Cholecystectomy; and Eye surgery.     SOCIAL HISTORY     Reviewed, and she  reports that she has never smoked. She has never used smokeless tobacco. She reports current alcohol use. She reports that she does not use drugs.     FAMILY HISTORY     Reviewed, and family history includes No Known Problems in her daughter, father, maternal aunt, maternal grandfather, maternal grandmother, mother, paternal aunt, paternal grandfather, paternal grandmother, and sister.     ALLERGIES     Allergies   Allergen Reactions    Amoxicillin Nausea and Vomiting    Lisinopril Cough    Latex Rash        REVIEW OF SYSTEMS     Pertinent items are noted in HPI.     HOME & HOSPITAL MEDICATIONS     Prior to Admission Medications  Medications Prior to Admission   Medication Sig Dispense Refill Last Dose    aspirin 81 MG EC tablet [ASPIRIN 81 MG EC TABLET] Take 81 mg by mouth daily.   Past Week at unknown    famotidine (PEPCID) 40 MG tablet Take 40 mg by mouth every evening   Past Week at unknown    losartan (COZAAR) 100 MG tablet " Take 100 mg by mouth daily   Past Week at unknown    multivitamin (CENTRUM SILVER) tablet Take 1 tablet by mouth daily   Past Week at unknown    [facility] [id number] UNIT/GM external powder Apply topically daily as needed   Past Month at prn    omeprazole (PRILOSEC) 20 MG capsule Take 20 mg by mouth daily   Past Week at unknown    simvastatin (ZOCOR) 40 MG tablet Take 40 mg by mouth at bedtime   Past Week at unknown    SYNTHROID 125 MCG tablet Take 125 mcg by mouth every morning   Past Week at unknown       Hospital Medications  Current Facility-Administered Medications   Medication Dose Route Frequency Provider Last Rate Last Admin    aspirin EC tablet 81 mg  81 mg Oral Daily Adelina Walker MD   81 mg at 04/23/24 0848    cefTRIAXone (ROCEPHIN) 1 g vial to attach to  mL bag for ADULTS or NS 50 mL bag for PEDS  1 g Intravenous Q24H Adelina Walker MD        enoxaparin ANTICOAGULANT (LOVENOX) injection 40 mg  40 mg Subcutaneous Q24H Adelina Walker MD   40 mg at 04/23/24 0019    famotidine (PEPCID) tablet 40 mg  40 mg Oral QPM Adelina Walker MD   40 mg at 04/23/24 0018    levothyroxine (SYNTHROID/LEVOTHROID) tablet 125 mcg  125 mcg Oral QAM Adelina Walker MD   125 mcg at 04/23/24 0848    losartan (COZAAR) tablet 100 mg  100 mg Oral Daily Adelina Walker MD   100 mg at 04/23/24 0848    pantoprazole (PROTONIX) EC tablet 40 mg  40 mg Oral QAM AC Adelina Walker MD   40 mg at 04/23/24 0848    simvastatin (ZOCOR) tablet 40 mg  40 mg Oral At Bedtime Adelina Walker MD   40 mg at 04/23/24 0018        PHYSICAL EXAM     Vital signs  Temp:  [98.1  F (36.7  C)-98.9  F (37.2  C)] 98.1  F (36.7  C)  Pulse:  [69-86] 69  Resp:  [16-31] 20  BP: (151-217)/() 187/80  SpO2:  [95 %-98 %] 96 %    General Physical Exam: Patient is alert and oriented x 2. Vital signs were reviewed and are documented in EMR. Neck was supple, no carotid bruit, thyromegaly, JVD or lymphadenopathy noted.  Neurological  Exam:  Patient is alert and oriented x 3 speech normal without dysarthria or aphasia.  Cranial nerves II through XII intact moves all 4 extremities strength is 5/5 no pronator drift reflexes trace positive in upper extremities absent in the lower extremity she has decreased light touch pinprick below the knees minimal dysmetria on finger-nose testing gait testing deferred     DIAGNOSTIC STUDIES     Pertinent Radiology   Radiology Results: Reviewed impression and images     MRI  HEAD MRI:   1.  No acute or subacute ischemic change.  2.  No acute intracranial process or abnormal enhancement.  3.  Generalized brain atrophy and presumed microvascular ischemic changes as detailed above.     HEAD MRA:   1.  Severe stenosis distal right V4 segment.  2.  Multiple severe stenoses of the right P2 segment.  3.  Moderate stenosis left P1 segment.  4.  No large vessel occlusion or aneurysm.     NECK MRA:  1.  Severe stenosis origin of the right vertebral artery.  2.  No hemodynamically significant stenosis in the internal carotid arteries or left vertebral artery.    Pertinent Labs   Lab Results: Personally Reviewed   Recent Results (from the past 24 hour(s))   Comprehensive metabolic panel    Collection Time: 04/22/24  5:40 PM   Result Value Ref Range    Sodium 143 135 - 145 mmol/L    Potassium 4.0 3.4 - 5.3 mmol/L    Carbon Dioxide (CO2) 26 22 - 29 mmol/L    Anion Gap 11 7 - 15 mmol/L    Urea Nitrogen 16.2 8.0 - 23.0 mg/dL    Creatinine 0.92 0.51 - 0.95 mg/dL    GFR Estimate 63 >60 mL/min/1.73m2    Calcium 9.7 8.8 - 10.2 mg/dL    Chloride 106 98 - 107 mmol/L    Glucose 106 (H) 70 - 99 mg/dL    Alkaline Phosphatase 77 40 - 150 U/L    AST 45 0 - 45 U/L    ALT 28 0 - 50 U/L    Protein Total 6.9 6.4 - 8.3 g/dL    Albumin 3.7 3.5 - 5.2 g/dL    Bilirubin Total 0.4 <=1.2 mg/dL   Alcohol level blood    Collection Time: 04/22/24  5:40 PM   Result Value Ref Range    Alcohol ethyl <0.01 <=0.01 g/dL   Ammonia (on ice)    Collection Time:  04/22/24  5:40 PM   Result Value Ref Range    Ammonia 12 11 - 51 umol/L   Troponin T, High Sensitivity (now)    Collection Time: 04/22/24  5:40 PM   Result Value Ref Range    Troponin T, High Sensitivity 13 <=14 ng/L   CBC with platelets and differential    Collection Time: 04/22/24  5:40 PM   Result Value Ref Range    WBC Count 8.2 4.0 - 11.0 10e3/uL    RBC Count 4.93 3.80 - 5.20 10e6/uL    Hemoglobin 13.5 11.7 - 15.7 g/dL    Hematocrit 42.5 35.0 - 47.0 %    MCV 86 78 - 100 fL    MCH 27.4 26.5 - 33.0 pg    MCHC 31.8 31.5 - 36.5 g/dL    RDW 16.4 (H) 10.0 - 15.0 %    Platelet Count 230 150 - 450 10e3/uL    % Neutrophils 70 %    % Lymphocytes 18 %    % Monocytes 9 %    % Eosinophils 2 %    % Basophils 1 %    % Immature Granulocytes 0 %    NRBCs per 100 WBC 0 <1 /100    Absolute Neutrophils 5.7 1.6 - 8.3 10e3/uL    Absolute Lymphocytes 1.5 0.8 - 5.3 10e3/uL    Absolute Monocytes 0.7 0.0 - 1.3 10e3/uL    Absolute Eosinophils 0.2 0.0 - 0.7 10e3/uL    Absolute Basophils 0.0 0.0 - 0.2 10e3/uL    Absolute Immature Granulocytes 0.0 <=0.4 10e3/uL    Absolute NRBCs 0.0 10e3/uL   Magnesium    Collection Time: 04/22/24  5:40 PM   Result Value Ref Range    Magnesium 2.2 1.7 - 2.3 mg/dL   TSH with free T4 reflex    Collection Time: 04/22/24  5:40 PM   Result Value Ref Range    TSH 6.28 (H) 0.30 - 4.20 uIU/mL   Procalcitonin    Collection Time: 04/22/24  5:40 PM   Result Value Ref Range    Procalcitonin 0.08 <0.50 ng/mL   T4 free    Collection Time: 04/22/24  5:40 PM   Result Value Ref Range    Free T4 1.36 0.90 - 1.70 ng/dL   UA with Microscopic reflex to Culture    Collection Time: 04/22/24  6:22 PM    Specimen: Urine, Midstream   Result Value Ref Range    Color Urine Light Yellow Colorless, Straw, Light Yellow, Yellow    Appearance Urine Clear Clear    Glucose Urine Negative Negative mg/dL    Bilirubin Urine Negative Negative    Ketones Urine Trace (A) Negative mg/dL    Specific Gravity Urine 1.025 1.001 - 1.030    Blood Urine  Negative Negative    pH Urine 5.5 5.0 - 7.0    Protein Albumin Urine 10 (A) Negative mg/dL    Urobilinogen Urine <2.0 <2.0 mg/dL    Nitrite Urine Negative Negative    Leukocyte Esterase Urine 500 Bertha/uL (A) Negative    Mucus Urine Present (A) None Seen /LPF    RBC Urine 2 <=2 /HPF    WBC Urine 57 (H) <=5 /HPF    Squamous Epithelials Urine <1 <=1 /HPF   Urine Culture    Collection Time: 04/22/24  6:22 PM    Specimen: Urine, Midstream   Result Value Ref Range    Culture No growth, less than 1 day        Total time spent for face to face visit, reviewing labs/imaging studies, counseling and coordination of care was: 1 Hour 30 Minutes More than 50% of this time was spent on counseling and coordination of care.    This note was dictated using voice recognition software.  Any grammatical or context distortions are unintentional and inherent to the software.

## 2024-04-23 NOTE — MEDICATION SCRIBE - ADMISSION MEDICATION HISTORY
Medication Scribe Admission Medication History    Admission medication history is complete. The information provided in this note is only as accurate as the sources available at the time of the update.    Information Source(s): Patient and CareEverywhere/SureScripts via in-person    Pertinent Information: Patient reports self management of medications. Patient unsure is she took her medications today. Due to patient's mental status, Sure scripts data was highly weighted for medication list.     Changes made to PTA medication list:  Added: Centrum Silver  Deleted: Diamox, Zithromax, Folic acid, Aleve, Zantac, Kenalog, Vaniqa  Changed: Synthroid from 112 to 125     Allergies reviewed with patient and updates made in EHR: yes    Medication History Completed By: Josué Jaquez 4/22/2024 10:01 PM    PTA Med List   Medication Sig Last Dose    aspirin 81 MG EC tablet [ASPIRIN 81 MG EC TABLET] Take 81 mg by mouth daily. Past Week at unknown    famotidine (PEPCID) 40 MG tablet Take 40 mg by mouth every evening Past Week at unknown    losartan (COZAAR) 100 MG tablet Take 100 mg by mouth daily Past Week at unknown    multivitamin (CENTRUM SILVER) tablet Take 1 tablet by mouth daily Past Week at unknown    [facility] [id number] UNIT/GM external powder Apply topically daily as needed Past Month at prn    omeprazole (PRILOSEC) 20 MG capsule Take 20 mg by mouth daily Past Week at unknown    simvastatin (ZOCOR) 40 MG tablet Take 40 mg by mouth at bedtime Past Week at unknown    SYNTHROID 125 MCG tablet Take 125 mcg by mouth every morning Past Week at unknown

## 2024-04-23 NOTE — ED NOTES
Pt son at bedside awaiting her return from MRI. Pt son updated with test results and plan of care. He has taken his moms purse and put in the family car. Purse contains her keys, cell phone, money, and 2 rings that I removed before she went to MRI.

## 2024-04-23 NOTE — PROGRESS NOTES
PRIMARY DIAGNOSIS: GENERALIZED WEAKNESS    OUTPATIENT/OBSERVATION GOALS TO BE MET BEFORE DISCHARGE  1. Orthostatic performed: N/A    2. Tolerating PO medications: Yes    3. Return to near baseline physical activity: No    4. Cleared for discharge by consultants (if involved): Yes    Discharge Planner Nurse   Safe discharge environment identified: Yes  Barriers to discharge: Yes       Entered by: Ophelia Lugo RN 04/23/2024 10:41 AM   Patient was significantly weak when transferring to chair or bed this morning.   Please review provider order for any additional goals.   Nurse to notify provider when observation goals have been met and patient is ready for discharge.

## 2024-04-23 NOTE — ED NOTES
[facility] ED Handoff Report    ED Chief Complaint: Altered Mental Status    ED Diagnosis:  (R26.81) Unsteady gait  Comment:   Plan: PT, total assist with getting up and use wheel chair to take pt to the bathroom.    (R41.0) Confusion  Comment:   Plan: Reorientation       PMH:  History reviewed. No pertinent past medical history.     Code Status:  Full Code     Falls Risk: Yes Band: Applied    Current Living Situation/Residence: lives with a significant other     Elimination Status: Continent: Yes     Activity Level: Total assist/lift    Patients Preferred Language:  English     Needed: No    Vital Signs:  BP (!) 162/72   Pulse 72   Temp 98.3  F (36.8  C) (Oral)   Resp (!) 31   Wt 79.4 kg (175 lb)   SpO2 96%   BMI 30.04 kg/m       Cardiac Rhythm: NSR  Pain Score: 0/10    Is the Patient Confused:  Yes    Last Food or Drink: 04/23/24 at 0005    Focused Assessment:  LOC, VS and increased in strength    Tests Performed: Done: Labs and Imaging    Treatments Provided:  Hydralazine, Pt's BP was in the 200's before. After hydralazine pt's BP in the 160's    Family Dynamics/Concerns: No    Family Updated On Visitor Policy: No    Plan of Care Communicated to Family: No    Who Was Updated about Plan of Care: Pt    Belongings Checklist Done and Signed by Patient: No    Medications sent with patient:    Covid: asymptomatic , negative    Additional Information:    RN: Jaydon Mora RN   4/23/2024 12:31 AM

## 2024-04-24 ENCOUNTER — APPOINTMENT (OUTPATIENT)
Dept: OCCUPATIONAL THERAPY | Facility: HOSPITAL | Age: 79
DRG: 602 | End: 2024-04-24
Payer: COMMERCIAL

## 2024-04-24 VITALS
RESPIRATION RATE: 16 BRPM | HEART RATE: 80 BPM | DIASTOLIC BLOOD PRESSURE: 74 MMHG | HEIGHT: 64 IN | BODY MASS INDEX: 30.86 KG/M2 | WEIGHT: 180.78 LBS | TEMPERATURE: 98.4 F | OXYGEN SATURATION: 96 % | SYSTOLIC BLOOD PRESSURE: 162 MMHG

## 2024-04-24 LAB
ANION GAP SERPL CALCULATED.3IONS-SCNC: 14 MMOL/L (ref 7–15)
BACTERIA UR CULT: NO GROWTH
BUN SERPL-MCNC: 8.1 MG/DL (ref 8–23)
CALCIUM SERPL-MCNC: 9.5 MG/DL (ref 8.8–10.2)
CHLORIDE SERPL-SCNC: 108 MMOL/L (ref 98–107)
CREAT SERPL-MCNC: 0.9 MG/DL (ref 0.51–0.95)
DEPRECATED HCO3 PLAS-SCNC: 23 MMOL/L (ref 22–29)
EGFRCR SERPLBLD CKD-EPI 2021: 65 ML/MIN/1.73M2
ERYTHROCYTE [DISTWIDTH] IN BLOOD BY AUTOMATED COUNT: 16.7 % (ref 10–15)
GLUCOSE SERPL-MCNC: 136 MG/DL (ref 70–99)
HCT VFR BLD AUTO: 45.3 % (ref 35–47)
HGB BLD-MCNC: 14.2 G/DL (ref 11.7–15.7)
MCH RBC QN AUTO: 26.9 PG (ref 26.5–33)
MCHC RBC AUTO-ENTMCNC: 31.3 G/DL (ref 31.5–36.5)
MCV RBC AUTO: 86 FL (ref 78–100)
PLATELET # BLD AUTO: 264 10E3/UL (ref 150–450)
POTASSIUM SERPL-SCNC: 3.5 MMOL/L (ref 3.4–5.3)
RBC # BLD AUTO: 5.28 10E6/UL (ref 3.8–5.2)
SODIUM SERPL-SCNC: 145 MMOL/L (ref 135–145)
WBC # BLD AUTO: 8.7 10E3/UL (ref 4–11)

## 2024-04-24 PROCEDURE — 97530 THERAPEUTIC ACTIVITIES: CPT | Mod: GO

## 2024-04-24 PROCEDURE — 80048 BASIC METABOLIC PNL TOTAL CA: CPT

## 2024-04-24 PROCEDURE — 85027 COMPLETE CBC AUTOMATED: CPT

## 2024-04-24 PROCEDURE — 250N000013 HC RX MED GY IP 250 OP 250 PS 637: Performed by: INTERNAL MEDICINE

## 2024-04-24 PROCEDURE — 250N000011 HC RX IP 250 OP 636: Performed by: INTERNAL MEDICINE

## 2024-04-24 PROCEDURE — 97535 SELF CARE MNGMENT TRAINING: CPT | Mod: GO

## 2024-04-24 PROCEDURE — 36415 COLL VENOUS BLD VENIPUNCTURE: CPT

## 2024-04-24 PROCEDURE — 99239 HOSP IP/OBS DSCHRG MGMT >30: CPT | Performed by: FAMILY MEDICINE

## 2024-04-24 RX ORDER — DOXYCYCLINE 100 MG/1
100 CAPSULE ORAL 2 TIMES DAILY
Status: SHIPPED
Start: 2024-04-24 | End: 2024-04-27

## 2024-04-24 RX ADMIN — LEVOTHYROXINE SODIUM 125 MCG: 125 TABLET ORAL at 08:16

## 2024-04-24 RX ADMIN — ASPIRIN 81 MG: 81 TABLET, COATED ORAL at 08:16

## 2024-04-24 RX ADMIN — LOSARTAN POTASSIUM 100 MG: 50 TABLET, FILM COATED ORAL at 08:16

## 2024-04-24 RX ADMIN — ONDANSETRON 4 MG: 2 INJECTION INTRAMUSCULAR; INTRAVENOUS at 08:27

## 2024-04-24 RX ADMIN — PANTOPRAZOLE SODIUM 40 MG: 40 TABLET, DELAYED RELEASE ORAL at 06:39

## 2024-04-24 RX ADMIN — TRAMADOL HYDROCHLORIDE 50 MG: 50 TABLET, COATED ORAL at 08:16

## 2024-04-24 RX ADMIN — ACETAMINOPHEN 650 MG: 325 TABLET ORAL at 08:15

## 2024-04-24 RX ADMIN — HYDRALAZINE HYDROCHLORIDE 10 MG: 20 INJECTION INTRAMUSCULAR; INTRAVENOUS at 04:54

## 2024-04-24 ASSESSMENT — ACTIVITIES OF DAILY LIVING (ADL)
ADLS_ACUITY_SCORE: 49
ADLS_ACUITY_SCORE: 45
ADLS_ACUITY_SCORE: 49
ADLS_ACUITY_SCORE: 50
ADLS_ACUITY_SCORE: 45

## 2024-04-24 NOTE — PLAN OF CARE
Physical Therapy Discharge Summary    Reason for therapy discharge:    Discharged to transitional care facility.    Progress towards therapy goal(s). See goals on Care Plan in [facility] electronic health record for goal details.  Goals not met.  Barriers to achieving goals:   discharge from facility.    Therapy recommendation(s):    Recommend continued therapies to improve overall strength, balance and mobility.       Usha Jordan, PT, DPT  4/24/2024

## 2024-04-24 NOTE — PROGRESS NOTES
NSG DISCHARGE NOTE    Patient discharged to transitional care unit at 11:16 AM via wheel chair. Accompanied by staff. Discharge instructions reviewed with patient, opportunity offered to ask questions. Prescriptions - None ordered for discharge. All belongings sent with patient. Including bra, long sleeved short, long sleeve cardigan, shorts, shoes and purse with >$60 cash, ID, credit cards and check book. Also, cell phone and .     Ophelia Lugo RN

## 2024-04-24 NOTE — PROGRESS NOTES
"PRIMARY DIAGNOSIS: \"GENERIC\" NURSING  OUTPATIENT/OBSERVATION GOALS TO BE MET BEFORE DISCHARGE:  ADLs back to baseline: No    Activity and level of assistance: Up with standby assistance.    Pain status: Improved-controlled with oral pain medications.    Return to near baseline physical activity: No     Discharge Planner Nurse   Safe discharge environment identified: Yes  Barriers to discharge: Yes       Entered by: Salome Shannon RN 04/23/2024 10:53 PM     Pt had /85 hydrALAZINE (APRESOLINE) injection 10 mg given.    Please review provider order for any additional goals.   Nurse to notify provider when observation goals have been met and patient is ready for discharge.  "

## 2024-04-24 NOTE — PLAN OF CARE
Problem: Adult Inpatient Plan of Care  Goal: Plan of Care Review  Description: The Plan of Care Review/Shift note should be completed every shift.  The Outcome Evaluation is a brief statement about your assessment that the patient is improving, declining, or no change.  This information will be displayed automatically on your shift  note.  Flowsheets (Taken 4/24/2024 0632)  Plan of Care Reviewed With: patient  Overall Patient Progress: improving  Goal: Absence of Hospital-Acquired Illness or Injury  Intervention: Identify and Manage Fall Risk  Recent Flowsheet Documentation  Taken 4/24/2024 0324 by Salome Shannon RN  Safety Promotion/Fall Prevention:   activity supervised   clutter free environment maintained   increase visualization of patient   lighting adjusted   nonskid shoes/slippers when out of bed   safety round/check completed   treat reversible contributory factors  Taken 4/24/2024 0014 by Salome Shannon RN  Safety Promotion/Fall Prevention:   activity supervised   clutter free environment maintained   increase visualization of patient   lighting adjusted   nonskid shoes/slippers when out of bed   safety round/check completed   treat reversible contributory factors  Taken 4/23/2024 2000 by Salome Shannon RN  Safety Promotion/Fall Prevention:   activity supervised   clutter free environment maintained   increase visualization of patient   lighting adjusted   nonskid shoes/slippers when out of bed   safety round/check completed   treat reversible contributory factors  Intervention: Prevent Skin Injury  Recent Flowsheet Documentation  Taken 4/24/2024 0324 by Salome Shannon RN  Body Position: position changed independently  Taken 4/24/2024 0014 by Salome Shannon RN  Body Position: position changed independently  Taken 4/23/2024 2000 by Salome Shannon RN  Body Position: position changed independently  Intervention: Prevent and Manage VTE (Venous Thromboembolism) Risk  Recent Flowsheet  Documentation  Taken 4/24/2024 0324 by Salome Shannon RN  VTE Prevention/Management: compression stockings off  Taken 4/24/2024 0014 by Salome Shannon RN  VTE Prevention/Management: compression stockings off  Taken 4/23/2024 2000 by Salome Shannon RN  VTE Prevention/Management: foot pump device off  Intervention: Prevent Infection  Recent Flowsheet Documentation  Taken 4/24/2024 0324 by Salome Shannon RN  Infection Prevention:   single patient room provided   hand hygiene promoted  Taken 4/24/2024 0014 by Salome Shannon RN  Infection Prevention:   single patient room provided   hand hygiene promoted  Taken 4/23/2024 2000 by Salome Shannon RN  Infection Prevention:   single patient room provided   hand hygiene promoted     Problem: Risk for Delirium  Goal: Improved Behavioral Control  Intervention: Minimize Safety Risk  Recent Flowsheet Documentation  Taken 4/24/2024 0324 by Salome Shannon RN  Enhanced Safety Measures:   review medications for side effects with activity   room near unit station  Taken 4/24/2024 0014 by Salome Shannon RN  Enhanced Safety Measures:   review medications for side effects with activity   room near unit station  Taken 4/23/2024 2000 by Salome Shannon RN  Enhanced Safety Measures:   review medications for side effects with activity   room near unit station   Goal Outcome Evaluation:      Plan of Care Reviewed With: patient    Overall Patient Progress: improvingOverall Patient Progress: improving

## 2024-04-24 NOTE — PLAN OF CARE
Occupational Therapy Discharge Summary    Reason for therapy discharge:    Discharged to TCU    Progress towards therapy goal(s). See goals on Care Plan in [facility] electronic health record for goal details.  Progressing towards goals.    Therapy recommendation(s):    Recommend continued OT @ TCU.

## 2024-04-24 NOTE — PROGRESS NOTES
"PRIMARY DIAGNOSIS: \"GENERIC\" NURSING  OUTPATIENT/OBSERVATION GOALS TO BE MET BEFORE DISCHARGE:  ADLs back to baseline: No    Activity and level of assistance: Up with maximum assistance. Consider SW and/or PT evaluation.     Pain status: Improved-controlled with oral pain medications.    Return to near baseline physical activity: No     Discharge Planner Nurse   Safe discharge environment identified: Yes  Barriers to discharge: No       Entered by: Ophelia Lugo RN 04/24/2024 9:38 AM   Patient is medically ready to discharge to TCU. Ride is at 11:00 am.  Please review provider order for any additional goals.   Nurse to notify provider when observation goals have been met and patient is ready for discharge.  "

## 2024-04-24 NOTE — PROGRESS NOTES
"PRIMARY DIAGNOSIS: \"GENERIC\" NURSING  OUTPATIENT/OBSERVATION GOALS TO BE MET BEFORE DISCHARGE:  ADLs back to baseline: No    Activity and level of assistance: Up with standby assistance.    Pain status: Improved-controlled with oral pain medications.    Return to near baseline physical activity: No     Discharge Planner Nurse   Safe discharge environment identified: No  Barriers to discharge: Yes       Entered by: Salome Shannon RN 04/24/2024 3:01 AM     Please review provider order for any additional goals.   Nurse to notify provider when observation goals have been met and patient is ready for discharge.    Pt is on Tele NSR.  "

## 2024-04-24 NOTE — DISCHARGE SUMMARY
"[facility]  Hospitalist Discharge Summary      Date of Admission:  4/22/2024  Date of Discharge:  4/24/2024 11:15 AM  Discharging Provider: Renetta Fermin MD  Discharge Service: Hospitalist Service    Discharge Diagnoses     Encephalopathy, resolved.  Unclear etiology.  No clear infection or stroke.  Possible underlying MCI  Chronic stasis dermatitis with concerns for underlying mild cellulitis  GERD, hold famotidine in light of encephalopathy/memory changes and treat with PPI only   Hypothyroidism   Hypertension  Hyperlipidemia    Clinically Significant Risk Factors     # Obesity: Estimated body mass index is 31.03 kg/m  as calculated from the following:    Height as of this encounter: 1.626 m (5' 4\").    Weight as of this encounter: 82 kg (180 lb 12.4 oz).       Follow-ups Needed After Discharge   Follow-up Appointments     Follow Up and recommended labs and tests      Follow up with long-term physician.  The following labs/tests are   recommended: bmp in 7 days.  Follow up with wound clinic as previously scheduled              Discharge Disposition   Discharged to short-term care facility  Condition at discharge: Stable    Hospital Course     78-year-old female with a history of longstanding venous insufficiency with intermittent ulcers followed at wound clinic who came into the emergency department being sent over from wound clinic for further workup and evaluation of confusion and weakness.  Patient was somewhat vague historian but came to the ED where it was noted that she had confusion.  Initial was worked up  a stroke code with CT and MRI.  MRI negative for ischemia and patient was ended.  Initial concern was for UTI although UA overall unremarkable.  Remained here on ceftriaxone seen by neurology here given findings of intracranial atherosclerosis with recommendation to continue statin and aspirin and neuro signed off.  PT OT saw patient and recommended TCU which patient was " agreeable to.  Family bedded to chronic longstanding issues with insomnia and sleep disturbance.  Otherwise had no behavior issues while here and transferred to TCU to work on strengthening in good condition.  Advised more workup for possible MCI    Consultations This Hospital Stay   PHYSICAL THERAPY ADULT IP CONSULT  OCCUPATIONAL THERAPY ADULT IP CONSULT  CARE MANAGEMENT / SOCIAL WORK IP CONSULT  ADVANCE DIRECTIVE IP CONSULT  WOUND OSTOMY CONTINENCE NURSE  IP CONSULT  LYMPHEDEMA THERAPY IP CONSULT  NEUROSURGERY IP CONSULT  NEUROLOGY IP CONSULT  PHYSICAL THERAPY ADULT IP CONSULT  OCCUPATIONAL THERAPY ADULT IP CONSULT    Code Status   Full Code    Time Spent on this Encounter   I, Renetta Fermin MD, personally saw the patient today and spent greater than 30 minutes discharging this patient.       Renetta Fermin MD  [facility] EXTENDED RECOVERY [facility] SHORT STAY  [address] [place] [id number]  Phone: [phone]  Fax: [phone]  ______________________________________________________________________    Physical Exam   Vital Signs: Temp: 98.4  F (36.9  C) Temp src: Oral BP: (!) 162/74 Pulse: 80   Resp: 16 SpO2: 96 % O2 Device: None (Room air)    Weight: 180 lbs 12.44 oz  General Appearance: Pleasant female, NAD  Respiratory: CTA-B  Cardiovascular: RRR, no murmers  GI: soft, nontender, no masses  Skin: right lower leg with venous ulceration and erythema no purulence  Other: Grossly intact, intermittently sleepy in am (daughter states chronic sleep issues) - no deficits appreciated and moving all four extremities faitly well        Primary Care Physician   GLENDY JOSE    Discharge Orders      General info for SNF    Length of Stay Estimate: Short Term Care: Estimated # of Days <30  Condition at Discharge: Improving  Level of care:skilled   Rehabilitation Potential: Excellent  Admission H&P remains valid and up-to-date: Yes  Recent Chemotherapy: N/A  Use Nursing Home Standing Orders: Yes      Mantoux instructions    Give two-step Mantoux (PPD) Per Facility Policy Yes     Follow Up and recommended labs and tests    Follow up with custodial physician.  The following labs/tests are recommended: bmp in 7 days.  Follow up with wound clinic as previously scheduled     Reason for your hospital stay    Weakness     Activity - Up ad virgilio     Full Code     Physical Therapy Adult Consult    Evaluate and treat as clinically indicated.    Reason:  weakness     Occupational Therapy Adult Consult    Evaluate and treat as clinically indicated.    Reason:  suspected MCI - cognitive testing     Fall precautions     Diet    Follow this diet upon discharge: Orders Placed This Encounter      Combination Diet Low Saturated Fat Na <2400mg Diet       Significant Results and Procedures   Most Recent 3 CBC's:  Recent Labs   Lab Test 04/24/24  0812 04/22/24  1740   WBC 8.7 8.2   HGB 14.2 13.5   MCV 86 86    230     Most Recent 3 BMP's:  Recent Labs   Lab Test 04/24/24  0812 04/22/24  1740    143   POTASSIUM 3.5 4.0   CHLORIDE 108* 106   CO2 23 26   BUN 8.1 16.2   CR 0.90 0.92   ANIONGAP 14 11   JOE 9.5 9.7   * 106*     7-Day Micro Results       Collected Updated Procedure Result Status      04/22/2024 1822 04/24/2024 0633 Urine Culture [[id number]]   Urine, Midstream    Final result Component Value   Culture No Growth                     Most Recent TSH and T4:  Recent Labs   Lab Test 04/22/24  1740   TSH 6.28*   T4 1.36   ,   Results for orders placed or performed during the hospital encounter of 04/22/24   MR Brain w/o & w Contrast    Narrative    EXAM: MR BRAIN W/O and W CONTRAST, MRA NECK (CAROTIDS) W/O and W CONTRAST, MRA BRAIN ([place]) W/O CONTRAST  LOCATION: [facility]  DATE: 4/22/2024    INDICATION: hypertension, difficulty walking, confusion  COMPARISON: None.  CONTRAST: 10ml gadavist  TECHNIQUE:   1) Routine multiplanar multisequence head MRI without and with  intravenous contrast.  2) 3D time-of-flight head MRA without intravenous contrast.  3) Neck MRA without and with IV contrast. Stenosis measurements made according to NASCET criteria unless otherwise specified.    FINDINGS:  HEAD MRI:  INTRACRANIAL CONTENTS: No acute or subacute infarct. No mass, acute hemorrhage, or extra-axial fluid collections. Patchy nonspecific T2/FLAIR hyperintensities within the cerebral white matter most consistent with mild to moderate chronic microvascular   ischemic change. Mild to moderate generalized cerebral atrophy. No hydrocephalus. Normal position of the cerebellar tonsils. No pathologic contrast enhancement.    SELLA: No abnormality accounting for technique.    OSSEOUS STRUCTURES/SOFT TISSUES: Normal marrow signal. The major intracranial vascular flow voids are maintained.     ORBITS: Prior bilateral cataract surgery. Visualized portions of the orbits are otherwise unremarkable.     SINUSES/MASTOIDS: Mild mucosal thickening primarily involving the ethmoid air cells. No middle ear or mastoid effusion.     HEAD MRA:   ANTERIOR CIRCULATION: No severe stenosis/occlusion, aneurysm, or high flow vascular malformation. Standard [place] anatomy.    POSTERIOR CIRCULATION: Severe stenosis distal right V4 segment. Multiple severe stenoses are present in the right P2 segment. Moderate stenosis left P1 segment. No large vessel occlusion or aneurysm.     NECK MRA:   RIGHT CAROTID: No measurable stenosis or dissection.    LEFT CAROTID: No measurable stenosis or dissection.    VERTEBRAL ARTERIES: The bilateral vertebral arteries appear somewhat irregular on postcontrast images, left greater than right. Normal appearance of the vertebral arteries on the 2-D time-of-flight sequences. Findings are favored to be artifactual. There   is no high-grade stenosis within the left vertebral artery. Severe stenosis origin of the right vertebral artery. The appearance of a moderate to severe stenosis  in the region of the distal right V1/proximal right V2 segment is not seen on the 2-D   time-of-flight images and is favored to be artifactual given degraded appearance of the vertebral arteries and postcontrast imaging     AORTIC ARCH: Classic aortic arch anatomy with no significant stenosis at the origin of the great vessels.      Impression    IMPRESSION:  HEAD MRI:   1.  No acute or subacute ischemic change.  2.  No acute intracranial process or abnormal enhancement.  3.  Generalized brain atrophy and presumed microvascular ischemic changes as detailed above.    HEAD MRA:   1.  Severe stenosis distal right V4 segment.  2.  Multiple severe stenoses of the right P2 segment.  3.  Moderate stenosis left P1 segment.  4.  No large vessel occlusion or aneurysm.    NECK MRA:  1.  Severe stenosis origin of the right vertebral artery.  2.  No hemodynamically significant stenosis in the internal carotid arteries or left vertebral artery.   MRA Brain ([place]) wo Contrast    Narrative    EXAM: MR BRAIN W/O and W CONTRAST, MRA NECK (CAROTIDS) W/O and W CONTRAST, MRA BRAIN ([place]) W/O CONTRAST  LOCATION: [facility]  DATE: 4/22/2024    INDICATION: hypertension, difficulty walking, confusion  COMPARISON: None.  CONTRAST: 10ml gadavist  TECHNIQUE:   1) Routine multiplanar multisequence head MRI without and with intravenous contrast.  2) 3D time-of-flight head MRA without intravenous contrast.  3) Neck MRA without and with IV contrast. Stenosis measurements made according to NASCET criteria unless otherwise specified.    FINDINGS:  HEAD MRI:  INTRACRANIAL CONTENTS: No acute or subacute infarct. No mass, acute hemorrhage, or extra-axial fluid collections. Patchy nonspecific T2/FLAIR hyperintensities within the cerebral white matter most consistent with mild to moderate chronic microvascular   ischemic change. Mild to moderate generalized cerebral atrophy. No hydrocephalus. Normal  position of the cerebellar tonsils. No pathologic contrast enhancement.    SELLA: No abnormality accounting for technique.    OSSEOUS STRUCTURES/SOFT TISSUES: Normal marrow signal. The major intracranial vascular flow voids are maintained.     ORBITS: Prior bilateral cataract surgery. Visualized portions of the orbits are otherwise unremarkable.     SINUSES/MASTOIDS: Mild mucosal thickening primarily involving the ethmoid air cells. No middle ear or mastoid effusion.     HEAD MRA:   ANTERIOR CIRCULATION: No severe stenosis/occlusion, aneurysm, or high flow vascular malformation. Standard [place] anatomy.    POSTERIOR CIRCULATION: Severe stenosis distal right V4 segment. Multiple severe stenoses are present in the right P2 segment. Moderate stenosis left P1 segment. No large vessel occlusion or aneurysm.     NECK MRA:   RIGHT CAROTID: No measurable stenosis or dissection.    LEFT CAROTID: No measurable stenosis or dissection.    VERTEBRAL ARTERIES: The bilateral vertebral arteries appear somewhat irregular on postcontrast images, left greater than right. Normal appearance of the vertebral arteries on the 2-D time-of-flight sequences. Findings are favored to be artifactual. There   is no high-grade stenosis within the left vertebral artery. Severe stenosis origin of the right vertebral artery. The appearance of a moderate to severe stenosis in the region of the distal right V1/proximal right V2 segment is not seen on the 2-D   time-of-flight images and is favored to be artifactual given degraded appearance of the vertebral arteries and postcontrast imaging     AORTIC ARCH: Classic aortic arch anatomy with no significant stenosis at the origin of the great vessels.      Impression    IMPRESSION:  HEAD MRI:   1.  No acute or subacute ischemic change.  2.  No acute intracranial process or abnormal enhancement.  3.  Generalized brain atrophy and presumed microvascular ischemic changes as detailed above.    HEAD  MRA:   1.  Severe stenosis distal right V4 segment.  2.  Multiple severe stenoses of the right P2 segment.  3.  Moderate stenosis left P1 segment.  4.  No large vessel occlusion or aneurysm.    NECK MRA:  1.  Severe stenosis origin of the right vertebral artery.  2.  No hemodynamically significant stenosis in the internal carotid arteries or left vertebral artery.   MRA Neck (Carot[place]) wo & w Contrast    Narrative    EXAM: MR BRAIN W/O and W CONTRAST, MRA NECK (CAROTIDS) W/O and W CONTRAST, MRA BRAIN ([place]) W/O CONTRAST  LOCATION: [facility]  DATE: 4/22/2024    INDICATION: hypertension, difficulty walking, confusion  COMPARISON: None.  CONTRAST: 10ml gadavist  TECHNIQUE:   1) Routine multiplanar multisequence head MRI without and with intravenous contrast.  2) 3D time-of-flight head MRA without intravenous contrast.  3) Neck MRA without and with IV contrast. Stenosis measurements made according to NASCET criteria unless otherwise specified.    FINDINGS:  HEAD MRI:  INTRACRANIAL CONTENTS: No acute or subacute infarct. No mass, acute hemorrhage, or extra-axial fluid collections. Patchy nonspecific T2/FLAIR hyperintensities within the cerebral white matter most consistent with mild to moderate chronic microvascular   ischemic change. Mild to moderate generalized cerebral atrophy. No hydrocephalus. Normal position of the cerebellar tonsils. No pathologic contrast enhancement.    SELLA: No abnormality accounting for technique.    OSSEOUS STRUCTURES/SOFT TISSUES: Normal marrow signal. The major intracranial vascular flow voids are maintained.     ORBITS: Prior bilateral cataract surgery. Visualized portions of the orbits are otherwise unremarkable.     SINUSES/MASTOIDS: Mild mucosal thickening primarily involving the ethmoid air cells. No middle ear or mastoid effusion.     HEAD MRA:   ANTERIOR CIRCULATION: No severe stenosis/occlusion, aneurysm, or high flow vascular malformation.  Standard [place] anatomy.    POSTERIOR CIRCULATION: Severe stenosis distal right V4 segment. Multiple severe stenoses are present in the right P2 segment. Moderate stenosis left P1 segment. No large vessel occlusion or aneurysm.     NECK MRA:   RIGHT CAROTID: No measurable stenosis or dissection.    LEFT CAROTID: No measurable stenosis or dissection.    VERTEBRAL ARTERIES: The bilateral vertebral arteries appear somewhat irregular on postcontrast images, left greater than right. Normal appearance of the vertebral arteries on the 2-D time-of-flight sequences. Findings are favored to be artifactual. There   is no high-grade stenosis within the left vertebral artery. Severe stenosis origin of the right vertebral artery. The appearance of a moderate to severe stenosis in the region of the distal right V1/proximal right V2 segment is not seen on the 2-D   time-of-flight images and is favored to be artifactual given degraded appearance of the vertebral arteries and postcontrast imaging     AORTIC ARCH: Classic aortic arch anatomy with no significant stenosis at the origin of the great vessels.      Impression    IMPRESSION:  HEAD MRI:   1.  No acute or subacute ischemic change.  2.  No acute intracranial process or abnormal enhancement.  3.  Generalized brain atrophy and presumed microvascular ischemic changes as detailed above.    HEAD MRA:   1.  Severe stenosis distal right V4 segment.  2.  Multiple severe stenoses of the right P2 segment.  3.  Moderate stenosis left P1 segment.  4.  No large vessel occlusion or aneurysm.    NECK MRA:  1.  Severe stenosis origin of the right vertebral artery.  2.  No hemodynamically significant stenosis in the internal carotid arteries or left vertebral artery.       Discharge Medications   Discharge Medication List as of 4/24/2024  9:43 AM        START taking these medications    Details   doxycycline hyclate (VIBRAMYCIN) 100 MG capsule Take 1 capsule (100 mg) by mouth 2 times  daily for 3 days, No Print Out      melatonin 1 MG TABS tablet Take 1 tablet (1 mg) by mouth nightly as needed for sleep, No Print Out           CONTINUE these medications which have NOT CHANGED    Details   aspirin 81 MG EC tablet [ASPIRIN 81 MG EC TABLET] Take 81 mg by mouth daily., Historical      losartan (COZAAR) 100 MG tablet Take 100 mg by mouth daily, Historical      multivitamin (CENTRUM SILVER) tablet Take 1 tablet by mouth daily, Historical      NYAMYC 699309 UNIT/GM external powder Apply topically daily as needed, TIFF, Historical      omeprazole (PRILOSEC) 20 MG capsule Take 20 mg by mouth daily, Historical      simvastatin (ZOCOR) 40 MG tablet Take 40 mg by mouth at bedtime, Historical      SYNTHROID 125 MCG tablet Take 125 mcg by mouth every morning, TIFF, Historical           STOP taking these medications       famotidine (PEPCID) 40 MG tablet Comments:   Reason for Stopping:             Allergies   Allergies   Allergen Reactions    Amoxicillin Nausea and Vomiting    Lisinopril Cough    Latex Rash

## 2024-04-24 NOTE — PROGRESS NOTES
Care Management Discharge Note    Discharge Date: 04/27/2024       Discharge Disposition: Transitional Care    Discharge Services: None    Discharge DME: None    Discharge Transportation: car, drives self    Private pay costs discussed: transportation costs    Does the patient's insurance plan have a 3 day qualifying hospital stay waiver?  Yes     Which insurance plan 3 day waiver is available? Alternative insurance waiver    Will the waiver be used for post-acute placement? Yes    PAS Confirmation Code:  [id number]  Patient/family educated on Medicare website which has current facility and service quality ratings: yes    Education Provided on the Discharge Plan: Yes  Persons Notified of Discharge Plans: yes  Patient/Family in Agreement with the Plan: yes    Handoff Referral Completed: Yes    Additional Information:  Pt accepted to the [facility] (insurance auth approved). Pt stated that she is in need of transportation. [employer] ride set for 1100 with a  window between [phone] (agreeable to private pay). All parties aware and agreeable to discharge plan. PAS DONE.  7:58 AM    Brenna Kjellberg, BSW  4/24/2024

## 2024-04-24 NOTE — PLAN OF CARE
Goal Outcome Evaluation:    Pt c/o of 3/10 R leg pain mostly w/ movement. Declines PRN for now Intermittent confusion and forgetfulness. When asked her date of birth, it took pt awhile to recall her birthday. Alert but sleepy. Pt reports falling asleep easily even when she is already awake and doing things. Wound cares done on R shin area on this shift w/ good pt tolerance and no complaints of pain during cares.     Tele-NSR    Problem: Adult Inpatient Plan of Care  Goal: Absence of Hospital-Acquired Illness or Injury  Intervention: Prevent and Manage VTE (Venous Thromboembolism) Risk  Recent Flowsheet Documentation  Taken 4/23/2024 1631 by Sherry Jensen, RN  VTE Prevention/Management: compression stockings on     Problem: Adult Inpatient Plan of Care  Goal: Optimal Comfort and Wellbeing  Outcome: Progressing     Problem: Adult Inpatient Plan of Care  Goal: Optimal Comfort and Wellbeing  Intervention: Monitor Pain and Promote Comfort  Recent Flowsheet Documentation  Taken 4/23/2024 1629 by Sherry Jensen, RN  Pain Management Interventions:   emotional support   medication offered but refused     Problem: Adult Inpatient Plan of Care  Goal: Readiness for Transition of Care  Outcome: Progressing     Problem: Risk for Delirium  Goal: Improved Sleep  Outcome: Progressing         Plan of Care Reviewed With: patient    Overall Patient Progress: improvingOverall Patient Progress: improving

## 2024-04-28 LAB
ATRIAL RATE - MUSE: 75 BPM
DIASTOLIC BLOOD PRESSURE - MUSE: NORMAL MMHG
INTERPRETATION ECG - MUSE: NORMAL
P AXIS - MUSE: 59 DEGREES
PR INTERVAL - MUSE: 184 MS
QRS DURATION - MUSE: 100 MS
QT - MUSE: 542 MS
QTC - MUSE: 605 MS
R AXIS - MUSE: -4 DEGREES
SYSTOLIC BLOOD PRESSURE - MUSE: NORMAL MMHG
T AXIS - MUSE: 83 DEGREES
VENTRICULAR RATE- MUSE: 75 BPM

## 2024-04-30 ENCOUNTER — LAB REQUISITION (OUTPATIENT)
Dept: LAB | Facility: CLINIC | Age: 79
End: 2024-04-30
Payer: COMMERCIAL

## 2024-04-30 ENCOUNTER — DOCUMENTATION ONLY (OUTPATIENT)
Dept: GERIATRICS | Facility: CLINIC | Age: 79
End: 2024-04-30
Payer: COMMERCIAL

## 2024-04-30 DIAGNOSIS — R60.9 EDEMA, UNSPECIFIED: ICD-10-CM

## 2024-05-01 ENCOUNTER — TRANSITIONAL CARE UNIT VISIT (OUTPATIENT)
Dept: GERIATRICS | Facility: CLINIC | Age: 79
End: 2024-05-01
Payer: COMMERCIAL

## 2024-05-01 ENCOUNTER — LAB REQUISITION (OUTPATIENT)
Dept: LAB | Facility: CLINIC | Age: 79
End: 2024-05-01
Payer: COMMERCIAL

## 2024-05-01 VITALS
HEIGHT: 63 IN | WEIGHT: 201 LBS | TEMPERATURE: 97.9 F | HEART RATE: 52 BPM | DIASTOLIC BLOOD PRESSURE: 103 MMHG | BODY MASS INDEX: 35.61 KG/M2 | RESPIRATION RATE: 18 BRPM | SYSTOLIC BLOOD PRESSURE: 186 MMHG | OXYGEN SATURATION: 97 %

## 2024-05-01 DIAGNOSIS — I10 PRIMARY HYPERTENSION: ICD-10-CM

## 2024-05-01 DIAGNOSIS — I89.0 LYMPHEDEMA DUE TO VENOUS INSUFFICIENCY: Primary | ICD-10-CM

## 2024-05-01 DIAGNOSIS — I87.2 LYMPHEDEMA DUE TO VENOUS INSUFFICIENCY: Primary | ICD-10-CM

## 2024-05-01 DIAGNOSIS — E78.5 HYPERLIPIDEMIA LDL GOAL <130: ICD-10-CM

## 2024-05-01 DIAGNOSIS — Z87.440 PERSONAL HISTORY OF URINARY TRACT INFECTION: ICD-10-CM

## 2024-05-01 DIAGNOSIS — E02 SUBCLINICAL IODINE-DEFICIENCY HYPOTHYROIDISM: ICD-10-CM

## 2024-05-01 DIAGNOSIS — I10 ESSENTIAL (PRIMARY) HYPERTENSION: ICD-10-CM

## 2024-05-01 LAB
ANION GAP SERPL CALCULATED.3IONS-SCNC: 10 MMOL/L (ref 7–15)
BUN SERPL-MCNC: 12.6 MG/DL (ref 8–23)
CALCIUM SERPL-MCNC: 9.6 MG/DL (ref 8.8–10.2)
CHLORIDE SERPL-SCNC: 107 MMOL/L (ref 98–107)
CREAT SERPL-MCNC: 0.95 MG/DL (ref 0.51–0.95)
DEPRECATED HCO3 PLAS-SCNC: 28 MMOL/L (ref 22–29)
EGFRCR SERPLBLD CKD-EPI 2021: 61 ML/MIN/1.73M2
GLUCOSE SERPL-MCNC: 93 MG/DL (ref 70–99)
POTASSIUM SERPL-SCNC: 3.7 MMOL/L (ref 3.4–5.3)
SODIUM SERPL-SCNC: 145 MMOL/L (ref 135–145)

## 2024-05-01 PROCEDURE — 99304 1ST NF CARE SF/LOW MDM 25: CPT | Performed by: NURSE PRACTITIONER

## 2024-05-01 PROCEDURE — 80048 BASIC METABOLIC PNL TOTAL CA: CPT | Mod: ORL | Performed by: NURSE PRACTITIONER

## 2024-05-01 PROCEDURE — P9604 ONE-WAY ALLOW PRORATED TRIP: HCPCS | Mod: ORL | Performed by: NURSE PRACTITIONER

## 2024-05-01 PROCEDURE — 36415 COLL VENOUS BLD VENIPUNCTURE: CPT | Mod: ORL | Performed by: NURSE PRACTITIONER

## 2024-05-01 NOTE — LETTER
5/1/2024        RE: Irene Pena  [address] [facility] [address] [facility] [address]        [facility]    Code Status:  FULL CODE   Visit Type:   Chief Complaint   Patient presents with     TCU Admission     [facility] 4/22/2024 - 4/24/2024     Facility:  [facility] ([facility]) [[id number]]         HPI: Irene Pena is a 78 year old female who I am seeing today for admit to the TCU. Past medical history includes venous insufficiency with chronic ulcers to BLE, HTN, GERD, hypothyroidism and recurrent UTI. Pt sent to hospital from her wound clinic. Chronic ulcer to right leg followed by Wound Clinic since 2/24. Pt presented with increased confusion and weakness. Pt worked up for stroke.  MRI HEAD:Severe stenosis distal right V4 segment.  Multiple severe stenoses of the right P2 segment.  Moderate stenosis left P1 segment.MRI NECK:Severe stenosis origin of the right vertebral artery. No hemodynamically significant stenosis in the internal carotid arteries or left vertebral arteryIntracranial atherosclerosis was noted. Pt seen by neurology and statin and ASA recommended. Initially there was concern for UTI however UA unremarkable. Pt treated with ceftriaxone. Family reported longstanding concerns for insomnia. Recommended more workup for possible MCI.      Transitional Care Course: Today pt sitting up in bedside chair. Her  is present on exam. Underlying cognitive impairment noted. Pt answers questions with some prompting. She has low stretch bandaging to BLE. She continues with topical treatment orders in place. She denies any SOB or CP. No headache. Occ dizziness upon rising. She is voiding adequately and having regular bowel movements.     #GERD-on famotidine, Protonix.  #Hypothyroidism-on levothyroxine.  # HTN-on losartan.  #HLD-on simvastatin.       Assessment/Plan:     Lymphedema due to venous insufficiency  -chronic BLE edema.   -Wound to RLE.   -followed by wound clinic since Feb  2024. Has follow up tomorrow.   -OK for lymphedema therapy to eval and treat.   -Continue topical treatment prior to wrapping 3/weekly.     Personal history of urinary tract infection  -Completed antibiotics.     Primary hypertension  -losartan 100 mg every day.   -monitor bp.   -Follow up BMP.     Hyperlipidemia LDL goal <130  CAD  -Continue ASA and statin.   -Recommend out pt follow up for MCI work up.     Subclinical iodine-deficiency hypothyroidism  -continue PTA levothyroxine.     -Ok for PT, OT to eval and treat.     Active Ambulatory Problems     Diagnosis Date Noted     Secondary lymphedema 05/26/2023     Leg wound, left 09/14/2023     Lymphedema 01/18/2024     Confusion 04/22/2024     Unsteady gait 04/22/2024     Urinary tract infection 04/22/2024     Primary hypertension 04/22/2024     Impaired skin integrity 04/23/2024     Resolved Ambulatory Problems     Diagnosis Date Noted     No Resolved Ambulatory Problems     No Additional Past Medical History     Allergies   Allergen Reactions     Amoxicillin Nausea and Vomiting     Latex Rash     Lisinopril Cough       All Meds and Allergies reviewed in the record at the facility and is the most up-to-date.    Post Discharge Medication Reconciliation Status: discharge medications reconciled, continue medications without change  Current Outpatient Medications   Medication Sig Dispense Refill     aspirin 81 MG EC tablet [ASPIRIN 81 MG EC TABLET] Take 81 mg by mouth daily.       losartan (COZAAR) 100 MG tablet Take 100 mg by mouth daily       melatonin 1 MG TABS tablet Take 1 tablet (1 mg) by mouth nightly as needed for sleep       multivitamin (CENTRUM SILVER) tablet Take 1 tablet by mouth daily       NYAMYC 906779 UNIT/GM external powder Apply topically daily as needed       omeprazole (PRILOSEC) 20 MG capsule Take 20 mg by mouth daily       simvastatin (ZOCOR) 40 MG tablet Take 40 mg by mouth at bedtime       SYNTHROID 125 MCG tablet Take 125 mcg by mouth every  "morning       No current facility-administered medications for this visit.       REVIEW OF SYSTEMS:   10 point review of systems reviewed and pertinent positives in the HPI.     PHYSICAL EXAMINATION:  Physical Exam     Vital signs: BP (!) 186/103   Pulse 52   Temp 97.9  F (36.6  C)   Resp 18   Ht 1.6 m (5' 3\")   Wt 91.2 kg (201 lb)   SpO2 97%   BMI 35.61 kg/m    General: Awake, Alert, oriented x3, sitting up in bedside chair, conversant  HEENT: Pink conjunctiva,moist oral mucosa  NECK: Supple  CVS:  S1  S2, without murmur or gallop.   LUNG: Clear to auscultation, No wheezes, rales or rhonci.  BACK: No kyphosis of the thoracic spine  ABDOMEN: Soft, with positive bowel sounds  EXTREMITIES: Good range of motion on both upper and lower extremities, lymph wraps to BLE.   NEUROLOGIC: Intact, pulses palpable  PSYCHIATRIC: Cognitive impairment noted.       Labs:  All labs reviewed in the nursing home record and Epic   @  Lab Results   Component Value Date    WBC 8.7 04/24/2024     Lab Results   Component Value Date    RBC 5.28 04/24/2024     Lab Results   Component Value Date    HGB 14.2 04/24/2024     Lab Results   Component Value Date    HCT 45.3 04/24/2024     Lab Results   Component Value Date    MCV 86 04/24/2024     Lab Results   Component Value Date    MCH 26.9 04/24/2024     Lab Results   Component Value Date    MCHC 31.3 04/24/2024     Lab Results   Component Value Date    RDW 16.7 04/24/2024     Lab Results   Component Value Date     04/24/2024        @Last Comprehensive Metabolic Panel:  Sodium   Date Value Ref Range Status   04/24/2024 145 135 - 145 mmol/L Final     Comment:     Reference intervals for this test were updated on 09/26/2023 to more accurately reflect our healthy population. There may be differences in the flagging of prior results with similar values performed with this method. Interpretation of those prior results can be made in the context of the updated reference intervals.  "     Potassium   Date Value Ref Range Status   04/24/2024 3.5 3.4 - 5.3 mmol/L Final     Chloride   Date Value Ref Range Status   04/24/2024 108 (H) 98 - 107 mmol/L Final     Carbon Dioxide (CO2)   Date Value Ref Range Status   04/24/2024 23 22 - 29 mmol/L Final     Anion Gap   Date Value Ref Range Status   04/24/2024 14 7 - 15 mmol/L Final     Glucose   Date Value Ref Range Status   04/24/2024 136 (H) 70 - 99 mg/dL Final     Urea Nitrogen   Date Value Ref Range Status   04/24/2024 8.1 8.0 - 23.0 mg/dL Final     Creatinine   Date Value Ref Range Status   04/24/2024 0.90 0.51 - 0.95 mg/dL Final     GFR Estimate   Date Value Ref Range Status   04/24/2024 65 >60 mL/min/1.73m2 Final     Calcium   Date Value Ref Range Status   04/24/2024 9.5 8.8 - 10.2 mg/dL Final     > 35 minutes spent preparing for this visit, reviewing medications, labs, imaging as well as face to face with pt and her  and collaborating with nursing.     This note has been dictated using voice recognition software. Any grammatical or context distortions are unintentional and inherent to the software    Electronically signed by: Naima Chisholm CNP       Sincerely,        Naima Chisholm NP

## 2024-05-02 ENCOUNTER — OFFICE VISIT (OUTPATIENT)
Dept: VASCULAR SURGERY | Facility: CLINIC | Age: 79
End: 2024-05-02
Payer: COMMERCIAL

## 2024-05-02 VITALS — SYSTOLIC BLOOD PRESSURE: 156 MMHG | DIASTOLIC BLOOD PRESSURE: 84 MMHG | HEART RATE: 55 BPM | OXYGEN SATURATION: 95 %

## 2024-05-02 DIAGNOSIS — I89.0 LYMPHEDEMA: ICD-10-CM

## 2024-05-02 DIAGNOSIS — S81.802D WOUND OF LEFT LOWER EXTREMITY, SUBSEQUENT ENCOUNTER: Primary | ICD-10-CM

## 2024-05-02 LAB
ERYTHROCYTE [DISTWIDTH] IN BLOOD BY AUTOMATED COUNT: 15.8 % (ref 10–15)
HCT VFR BLD AUTO: 45 % (ref 35–47)
HGB BLD-MCNC: 14.5 G/DL (ref 11.7–15.7)
MCH RBC QN AUTO: 28 PG (ref 26.5–33)
MCHC RBC AUTO-ENTMCNC: 32.2 G/DL (ref 31.5–36.5)
MCV RBC AUTO: 87 FL (ref 78–100)
PLATELET # BLD AUTO: 267 10E3/UL (ref 150–450)
RBC # BLD AUTO: 5.18 10E6/UL (ref 3.8–5.2)
WBC # BLD AUTO: 6.6 10E3/UL (ref 4–11)

## 2024-05-02 PROCEDURE — 85027 COMPLETE CBC AUTOMATED: CPT | Mod: ORL | Performed by: FAMILY MEDICINE

## 2024-05-02 PROCEDURE — 36415 COLL VENOUS BLD VENIPUNCTURE: CPT | Mod: ORL | Performed by: FAMILY MEDICINE

## 2024-05-02 PROCEDURE — 80048 BASIC METABOLIC PNL TOTAL CA: CPT | Mod: ORL | Performed by: FAMILY MEDICINE

## 2024-05-02 PROCEDURE — P9604 ONE-WAY ALLOW PRORATED TRIP: HCPCS | Mod: ORL | Performed by: FAMILY MEDICINE

## 2024-05-02 PROCEDURE — 99214 OFFICE O/P EST MOD 30 MIN: CPT | Performed by: FAMILY MEDICINE

## 2024-05-02 PROCEDURE — G0463 HOSPITAL OUTPT CLINIC VISIT: HCPCS | Performed by: FAMILY MEDICINE

## 2024-05-02 ASSESSMENT — PAIN SCALES - GENERAL: PAINLEVEL: MODERATE PAIN (4)

## 2024-05-02 NOTE — PATIENT INSTRUCTIONS
Congratulations! Your wounds are healed!    Continue to wear your velcros or your lymphedema wraps daily.

## 2024-05-02 NOTE — PROGRESS NOTES
Wound Clinic Note         Visit date: 05/02/2024       Cheif Complaint:     Irene Pena is a 78 year old  female had concerns including Wound Check.  The patient has lower extremity edema and a right leg ulcer         HISTORY OF PRESENT ILLNESS:    Irene Pena reports the right leg wound has been present since late February 2024 the wound began without a clear cause.  She previously saw Dr. Gottlieb here in the clinic to discuss treatment options for her venous insufficiency on April 26, 2023.  She also had a venous insufficiency performed that same day.  This did show some areas of superficial venous insufficiency.  Dr. Gottlieb offered her treatment for this superficial venous insufficiency however the patient declined at that time.  However later she continued to have difficulties with her legs and her primary care doctor referred her to vein [facility] where she ended up having a vein procedure on both legs performed.    She continues to reside at a skilled nursing facility and the staff there have been changing the bandages every other day or 3 times a week.  They have continued to apply lymphedema wraps to both lower extremities.  She is still working with the physical therapy lymphedema therapist to order pneumatic lymphedema pumps.        The pateint denies fevers or chills.  They report the pain from the wound has been 0/10 and has remained about the same recently.      The patient reports propping up their legs occasionally during the day, but they do not elevate their legs above the level of their heart.  The patient confirms they do sleep in a bed.     Today the patient reports maintaining a high protein diet, but has not been taking protein supplements lately.        The patient denies a history of diabetes, smoking or chronic steroid use.         The patient has not had any symptoms of infection relating to the wound recently and is not currently on antibiotics.        Problem List:   No past medical history on file.          Family Hx: family history includes No Known Problems in her daughter, father, maternal aunt, maternal grandfather, maternal grandmother, mother, paternal aunt, paternal grandfather, paternal grandmother, and sister.       Surgical Hx:   Past Surgical History:   Procedure Laterality Date    CHOLECYSTECTOMY      EYE SURGERY      STRIP VEIN Left 2006    rfa          Allergies:    Allergies   Allergen Reactions    Amoxicillin Nausea and Vomiting    Latex Rash    Lisinopril Cough              Medication History:    Current Outpatient Medications   Medication Sig Dispense Refill    aspirin 81 MG EC tablet [ASPIRIN 81 MG EC TABLET] Take 81 mg by mouth daily.      losartan (COZAAR) 100 MG tablet Take 100 mg by mouth daily      melatonin 1 MG TABS tablet Take 1 tablet (1 mg) by mouth nightly as needed for sleep      multivitamin (CENTRUM SILVER) tablet Take 1 tablet by mouth daily      NYAMYC [id number] UNIT/GM external powder Apply topically daily as needed      omeprazole (PRILOSEC) 20 MG capsule Take 20 mg by mouth daily      simvastatin (ZOCOR) 40 MG tablet Take 40 mg by mouth at bedtime      SYNTHROID 125 MCG tablet Take 125 mcg by mouth every morning       No current facility-administered medications for this visit.         Tobacco History:  reports that she has never smoked. She has never used smokeless tobacco.       REVIEW OF SYMPTOMS:   The review of systems was negative except as noted in the HPI.           PHYSICAL EXAMINATION:     BP (!) 156/84   Pulse 55   SpO2 95%            GENERAL: The patient overall appears well and is no acute distress.   HEAD: normocephalic   EYES: Sclera and conjunctiva clear   NECK: no obvious masses   LUNGS: breathing is unlabored.   EXTREMITIES: No clubbing, cyanosis or edema   SKIN: No rashes or other abnormalities except as noted under the Wound section below.   NEUROLOGICAL: normal motor and sensory function   EDEMA:  Moderate       WOUND: Healed      Also see below for wound details:       Circumferential volume measures:            4/4/2024    11:00 AM   Circumferential Measures   Right just above MTP 21.5   Right Ankle 26   Right Widest Calf 39.5   Right Knee to Ankle 34   Left - just above MTP 24   Left Ankle 28   Left Widest Calf 41.5   Left Knee to Ankle 34       Ulceration(s)/Wound(s):   Please see the media tab under the chart review for pictures of the wounds.  Nursing staff removed dressings and cleansed wound.                         No results for input(s): HGBA1C, A1C, EAG in the last 28408 hours.    Invalid input(s): [id number]       No results for input(s): ALBUMIN in the last 56950 hours.           No debridement performed today.                  ASSESSMENT:   This is a 78 year old  female with a right leg ulcer, the patient also has lower extremity edema which was also managed during today's clinic visit.          PLAN:   No further bandages are required.  I do recommend that she continue to wear her Velcro compression garments changed with bathing for long-term control of her swelling.  She will continue to work with the physical therapist to get the pneumatic lymphedema pump as soon as possible.      Separate from the wound care instructions we then discussed management strategies for lower extremity edema.  I explained the keys for managing lower extremity edema are compression and elevation.  I have encouraged the patient to continue to elevate the legs as they have been doing, including laying down with their legs above the level of the heart for 30 minutes at least twice a day.  I have also encouraged the patient to continue to sleep in a bed.     I have encouraged the patient to continue on their high protein diet to aid in wound healing.   The patient will return to the wound clinic on an as-needed basis if further wounds develop.        30 minutes spent on the date of the encounter doing chart review,  history and exam, documentation and further activities per the note, this time excludes any procedure time          Silas Reece MD  05/02/2024   11:53 AM   [facility] Vascular/Wound  [phone]    This note was electronically signed by Silas Reece MD

## 2024-05-03 ENCOUNTER — TRANSITIONAL CARE UNIT VISIT (OUTPATIENT)
Dept: GERIATRICS | Facility: CLINIC | Age: 79
End: 2024-05-03
Payer: COMMERCIAL

## 2024-05-03 VITALS
BODY MASS INDEX: 34.68 KG/M2 | HEART RATE: 55 BPM | SYSTOLIC BLOOD PRESSURE: 189 MMHG | WEIGHT: 195.8 LBS | TEMPERATURE: 97.8 F | DIASTOLIC BLOOD PRESSURE: 83 MMHG | RESPIRATION RATE: 19 BRPM | OXYGEN SATURATION: 98 %

## 2024-05-03 DIAGNOSIS — N39.0 URINARY TRACT INFECTION WITHOUT HEMATURIA, SITE UNSPECIFIED: ICD-10-CM

## 2024-05-03 DIAGNOSIS — R41.0 CONFUSION: ICD-10-CM

## 2024-05-03 DIAGNOSIS — I10 PRIMARY HYPERTENSION: Primary | ICD-10-CM

## 2024-05-03 DIAGNOSIS — I89.0 SECONDARY LYMPHEDEMA: ICD-10-CM

## 2024-05-03 DIAGNOSIS — R26.81 UNSTEADY GAIT: ICD-10-CM

## 2024-05-03 LAB
ANION GAP SERPL CALCULATED.3IONS-SCNC: 9 MMOL/L (ref 7–15)
BUN SERPL-MCNC: 13 MG/DL (ref 8–23)
CALCIUM SERPL-MCNC: 9.9 MG/DL (ref 8.8–10.2)
CHLORIDE SERPL-SCNC: 105 MMOL/L (ref 98–107)
CREAT SERPL-MCNC: 0.93 MG/DL (ref 0.51–0.95)
DEPRECATED HCO3 PLAS-SCNC: 29 MMOL/L (ref 22–29)
EGFRCR SERPLBLD CKD-EPI 2021: 63 ML/MIN/1.73M2
GLUCOSE SERPL-MCNC: 89 MG/DL (ref 70–99)
POTASSIUM SERPL-SCNC: 4.3 MMOL/L (ref 3.4–5.3)
SODIUM SERPL-SCNC: 143 MMOL/L (ref 135–145)

## 2024-05-03 PROCEDURE — 99309 SBSQ NF CARE MODERATE MDM 30: CPT | Performed by: NURSE PRACTITIONER

## 2024-05-03 NOTE — PROGRESS NOTES
[facility] GERIATRICS  ACUTE/EPISODIC VISIT    [facility] Medical Record Number:  [id number]  Place of Service where encounter took place:  [facility] ([facility]) [[id number]]    Chief Complaint   Patient presents with    RECHECK       HPI:    Irene Pena is a 78 year old  (1945), who is being seen today for an episodic care visit.  HPI information obtained from: facility chart records, facility staff, patient report, and [facility] chart review.    Today's concern is:    Diagnoses         Codes Comments    Primary hypertension    -  Primary I10     Secondary lymphedema     I89.0     Confusion     R41.0     Urinary tract infection without hematuria, site unspecified     N39.0     Unsteady gait     R26.81           Asked to follow up with Irene today per following TCU NP.  Mulu came earlier this week.  Was at a previous TCU but wanted to be move somewhere else.      Prior to going to see Mulu, nursing brought to attention that their readings of her B/P's there are getting are elevated.  Range of 150-180's/60-90's.  Most are elevated.  Reviewed allergies and medications.  Gave an order to start amlodipine 2.5mg at po daily in afternoon/supper time.      Found Mulu in her room.  She was able to recount what brought her into the hospital.  Increased confusion but she did not recognize it.  Was driving to an appointment for her lymphedema and therapy said she did not look good and found her SBP over 200.  Sent to the ED.  Found to have a UTI and currently no treatment but extremities weak and so receiving therapies.    Pat seemed to be very clear today cognitively but extremities/movements slow.      ALLERGIES:    Allergies   Allergen Reactions    Amoxicillin Nausea and Vomiting    Latex Rash    Lisinopril Cough        MEDICATIONS:  Post Discharge Medication Reconciliation Status:  Medication reconciled today.     Current Outpatient Medications   Medication Sig Dispense Refill     amLODIPine (NORVASC) 2.5 MG tablet Take 1 tablet (2.5 mg) by mouth daily      aspirin 81 MG EC tablet [ASPIRIN 81 MG EC TABLET] Take 81 mg by mouth daily.      losartan (COZAAR) 100 MG tablet Take 100 mg by mouth daily      melatonin 1 MG TABS tablet Take 1 tablet (1 mg) by mouth nightly as needed for sleep      multivitamin (CENTRUM SILVER) tablet Take 1 tablet by mouth daily      NYAMYC [id number] UNIT/GM external powder Apply topically daily as needed      omeprazole (PRILOSEC) 20 MG capsule Take 20 mg by mouth daily      simvastatin (ZOCOR) 40 MG tablet Take 40 mg by mouth at bedtime      SYNTHROID 125 MCG tablet Take 125 mcg by mouth every morning         REVIEW OF SYSTEMS:  4 point ROS neg other than the symptoms noted above in the HPI.  No chest pain, no dysuria, no bowel issues.        PHYSICAL EXAM:  BP (!) 189/83   Pulse 55   Temp 97.8  F (36.6  C)   Resp 19   Wt 88.8 kg (195 lb 12.8 oz)   SpO2 98%   BMI 34.68 kg/m    Alert and peasant x3.  Showed pictures of her great grandchild to this NP.    Skin was pink, dry and warm.  Heart rate regular and strong with S1 and S2 heard.  Velcro lymphedema wraps present.  Wearing velcro surgical shoes assuming for edema that is present on top of feet.  Lungs clear.  No oxygen.  Abdomen is round, soft and non-tender.  A1 with transfers.  Working with therapies for ambulation.    Component      Latest Ref Rng 5/2/2024  6:53 AM   Sodium      135 - 145 mmol/L 143    Potassium      3.4 - 5.3 mmol/L 4.3    Chloride      98 - 107 mmol/L 105    Carbon Dioxide (CO2)      22 - 29 mmol/L 29    Anion Gap      7 - 15 mmol/L 9    Urea Nitrogen      8.0 - 23.0 mg/dL 13.0    Creatinine      0.51 - 0.95 mg/dL 0.93    GFR Estimate      >60 mL/min/1.73m2 63    Calcium      8.8 - 10.2 mg/dL 9.9    Glucose      70 - 99 mg/dL 89    WBC      4.0 - 11.0 10e3/uL 6.6    RBC Count      3.80 - 5.20 10e6/uL 5.18    Hemoglobin      11.7 - 15.7 g/dL 14.5    Hematocrit      35.0 - 47.0 % 45.0     MCV      78 - 100 fL 87    MCH      26.5 - 33.0 pg 28.0    MCHC      31.5 - 36.5 g/dL 32.2    RDW      10.0 - 15.0 % 15.8 (H)    Platelet Count      150 - 450 10e3/uL 267       4/22/24  No growth in UC      ASSESSMENT / PLAN:  (I10) Primary hypertension  (primary encounter diagnosis)  Comment: already on Losartan 100mg daily.  Will add amlodipine 2.5mg po daily as this will help target her high DBP.    BMP on 5/8/24 for follow up of kidneys.  Vitals done daily already.  Plan: amLODIPine (NORVASC) 2.5 MG tablet    (I89.0) Secondary lymphedema  Comment: states she tries to get to lymphedema clinic 3x week.  Legs wrapped now.  The norvasc may exacerbate her edema but starting on small dose.  No diuretics.    (R41.0) Confusion  Comment: feel this is resolved.  Doing well now.  Has her goals to improve to return home.    (N39.0) Urinary tract infection without hematuria, site unspecified  Comment: saw that last UC check on 4/22/24 was negative and no acute symptoms.  Did talk today about symptoms in a older person with a UTI.      (R26.81) Unsteady gait  Comment: continues to work with therapies and discharge home when safe.  Spouse came at end of visit today as well.       Orders:  Amlodipine 2.5mg po daily in afternoon dx:  HTN  BMP on 5/8/24   HTN    Electronically signed by  ELHAM Dotson CNP

## 2024-05-03 NOTE — LETTER
5/3/2024        RE: Irene Pena  [address]  [place] [address]        [facility] GERIATRICS  ACUTE/EPISODIC VISIT    [facility] Medical Record Number:  [id number]  Place of Service where encounter took place:  [facility] [place] ([facility]) [[id number]]    Chief Complaint   Patient presents with     RECHECK       HPI:    Irene Pena is a 78 year old  (1945), who is being seen today for an episodic care visit.  HPI information obtained from: facility chart records, facility staff, patient report, and [facility] chart review.    Today's concern is:    Diagnoses         Codes Comments    Primary hypertension    -  Primary I10     Secondary lymphedema     I89.0     Confusion     R41.0     Urinary tract infection without hematuria, site unspecified     N39.0     Unsteady gait     R26.81           Asked to follow up with Irene today per following TCU NP.  Mulu came earlier this week.  Was at a previous TCU but wanted to be move somewhere else.      Prior to going to see Mulu, nursing brought to attention that their readings of her B/P's there are getting are elevated.  Range of 150-180's/60-90's.  Most are elevated.  Reviewed allergies and medications.  Gave an order to start amlodipine 2.5mg at po daily in afternoon/supper time.      Found Mulu in her room.  She was able to recount what brought her into the hospital.  Increased confusion but she did not recognize it.  Was driving to an appointment for her lymphedema and therapy said she did not look good and found her SBP over 200.  Sent to the ED.  Found to have a UTI and currently no treatment but extremities weak and so receiving therapies.    Pat seemed to be very clear today cognitively but extremities/movements slow.      ALLERGIES:    Allergies   Allergen Reactions     Amoxicillin Nausea and Vomiting     Latex Rash     Lisinopril Cough        MEDICATIONS:  Post Discharge Medication Reconciliation Status:  Medication  reconciled today.     Current Outpatient Medications   Medication Sig Dispense Refill     amLODIPine (NORVASC) 2.5 MG tablet Take 1 tablet (2.5 mg) by mouth daily       aspirin 81 MG EC tablet [ASPIRIN 81 MG EC TABLET] Take 81 mg by mouth daily.       losartan (COZAAR) 100 MG tablet Take 100 mg by mouth daily       melatonin 1 MG TABS tablet Take 1 tablet (1 mg) by mouth nightly as needed for sleep       multivitamin (CENTRUM SILVER) tablet Take 1 tablet by mouth daily       NYAMYC 836687 UNIT/GM external powder Apply topically daily as needed       omeprazole (PRILOSEC) 20 MG capsule Take 20 mg by mouth daily       simvastatin (ZOCOR) 40 MG tablet Take 40 mg by mouth at bedtime       SYNTHROID 125 MCG tablet Take 125 mcg by mouth every morning         REVIEW OF SYSTEMS:  4 point ROS neg other than the symptoms noted above in the HPI.  No chest pain, no dysuria, no bowel issues.        PHYSICAL EXAM:  BP (!) 189/83   Pulse 55   Temp 97.8  F (36.6  C)   Resp 19   Wt 88.8 kg (195 lb 12.8 oz)   SpO2 98%   BMI 34.68 kg/m    Alert and peasant x3.  Showed pictures of her great grandchild to this NP.    Skin was pink, dry and warm.  Heart rate regular and strong with S1 and S2 heard.  Velcro lymphedema wraps present.  Wearing velcro surgical shoes assuming for edema that is present on top of feet.  Lungs clear.  No oxygen.  Abdomen is round, soft and non-tender.  A1 with transfers.  Working with therapies for ambulation.    Component      Latest Ref Rng 5/2/2024  6:53 AM   Sodium      135 - 145 mmol/L 143    Potassium      3.4 - 5.3 mmol/L 4.3    Chloride      98 - 107 mmol/L 105    Carbon Dioxide (CO2)      22 - 29 mmol/L 29    Anion Gap      7 - 15 mmol/L 9    Urea Nitrogen      8.0 - 23.0 mg/dL 13.0    Creatinine      0.51 - 0.95 mg/dL 0.93    GFR Estimate      >60 mL/min/1.73m2 63    Calcium      8.8 - 10.2 mg/dL 9.9    Glucose      70 - 99 mg/dL 89    WBC      4.0 - 11.0 10e3/uL 6.6    RBC Count      3.80 - 5.20  10e6/uL 5.18    Hemoglobin      11.7 - 15.7 g/dL 14.5    Hematocrit      35.0 - 47.0 % 45.0    MCV      78 - 100 fL 87    MCH      26.5 - 33.0 pg 28.0    MCHC      31.5 - 36.5 g/dL 32.2    RDW      10.0 - 15.0 % 15.8 (H)    Platelet Count      150 - 450 10e3/uL 267       4/22/24  No growth in UC      ASSESSMENT / PLAN:  (I10) Primary hypertension  (primary encounter diagnosis)  Comment: already on Losartan 100mg daily.  Will add amlodipine 2.5mg po daily as this will help target her high DBP.    BMP on 5/8/24 for follow up of kidneys.  Vitals done daily already.  Plan: amLODIPine (NORVASC) 2.5 MG tablet    (I89.0) Secondary lymphedema  Comment: states she tries to get to lymphedema clinic 3x week.  Legs wrapped now.  The norvasc may exacerbate her edema but starting on small dose.  No diuretics.    (R41.0) Confusion  Comment: feel this is resolved.  Doing well now.  Has her goals to improve to return home.    (N39.0) Urinary tract infection without hematuria, site unspecified  Comment: saw that last UC check on 4/22/24 was negative and no acute symptoms.  Did talk today about symptoms in a older person with a UTI.      (R26.81) Unsteady gait  Comment: continues to work with therapies and discharge home when safe.  Spouse came at end of visit today as well.       Orders:  Amlodipine 2.5mg po daily in afternoon dx:  HTN  BMP on 5/8/24   HTN    Electronically signed by  ELHAM Dotson CNP            Sincerely,        ELHAM Dotson CNP

## 2024-05-04 RX ORDER — AMLODIPINE BESYLATE 2.5 MG/1
2.5 TABLET ORAL DAILY
Status: SHIPPED
Start: 2024-05-03 | End: 2024-05-07

## 2024-05-07 ENCOUNTER — LAB REQUISITION (OUTPATIENT)
Dept: LAB | Facility: CLINIC | Age: 79
End: 2024-05-07
Payer: COMMERCIAL

## 2024-05-07 ENCOUNTER — TRANSITIONAL CARE UNIT VISIT (OUTPATIENT)
Dept: GERIATRICS | Facility: CLINIC | Age: 79
End: 2024-05-07
Payer: COMMERCIAL

## 2024-05-07 VITALS
HEART RATE: 62 BPM | WEIGHT: 196.2 LBS | HEIGHT: 63 IN | TEMPERATURE: 97.6 F | OXYGEN SATURATION: 96 % | BODY MASS INDEX: 34.76 KG/M2 | RESPIRATION RATE: 20 BRPM | DIASTOLIC BLOOD PRESSURE: 74 MMHG | SYSTOLIC BLOOD PRESSURE: 159 MMHG

## 2024-05-07 DIAGNOSIS — I10 ESSENTIAL (PRIMARY) HYPERTENSION: ICD-10-CM

## 2024-05-07 DIAGNOSIS — N39.0 URINARY TRACT INFECTION WITHOUT HEMATURIA, SITE UNSPECIFIED: ICD-10-CM

## 2024-05-07 DIAGNOSIS — I89.0 SECONDARY LYMPHEDEMA: ICD-10-CM

## 2024-05-07 DIAGNOSIS — I10 PRIMARY HYPERTENSION: Primary | ICD-10-CM

## 2024-05-07 PROCEDURE — 99309 SBSQ NF CARE MODERATE MDM 30: CPT | Performed by: NURSE PRACTITIONER

## 2024-05-07 RX ORDER — CHLORTHALIDONE 25 MG/1
12.5 TABLET ORAL DAILY
COMMUNITY

## 2024-05-07 NOTE — LETTER
5/7/2024        RE: Irene Pena  [address]  [place] [address]        [facility]    Code Status:  FULL CODE   Visit Type:   Chief Complaint   Patient presents with     TCU Follow Up     Facility:  [facility] ([facility]) [[id number]]         HPI: Irene Pena is a 78 year old female who I am seeing today for follow up on the TCU. Past medical history includes venous insufficiency with chronic ulcers to BLE, HTN, GERD, hypothyroidism and recurrent UTI. Pt sent to hospital from her wound clinic. Chronic ulcer to right leg followed by Wound Clinic since 2/24. Pt presented with increased confusion and weakness. Pt worked up for stroke.  MRI HEAD:Severe stenosis distal right V4 segment.  Multiple severe stenoses of the right P2 segment.  Moderate stenosis left P1 segment.MRI NECK:Severe stenosis origin of the right vertebral artery. No hemodynamically significant stenosis in the internal carotid arteries or left vertebral arteryIntracranial atherosclerosis was noted. Pt seen by neurology and statin and ASA recommended. Initially there was concern for UTI however UA unremarkable. Pt treated with ceftriaxone. Family reported longstanding concerns for insomnia. Recommended more workup for possible MCI.      Transitional Care Course: Today pt sitting up in wheelchair. Underlying cognitive impairment noted. BLE edema improved. She continues in compression. BP continues to be elevated in the 180s systolic. Amlodipine added by another provider however interaction with statin. Given her hx with edema will attempt to avoid. She denies any SOB or CP. No headache. She is voiding adequately and having regular bowel movements.        Assessment/Plan:     Lymphedema due to venous insufficiency  -chronic BLE edema.   -Wound to RLE now healed.   -followed by wound clinic since Feb 2024. Has follow up tomorrow.   -lymphedema therapist following.   -Continue topical treatment prior to wrapping  3/weekly.     Personal history of urinary tract infection  -Completed antibiotics.     Primary hypertension  -losartan 100 mg every day.   -Discontinue norvasc.   -start chlorthalidone 12.5 mg every day.   -monitor bp.   -Follow up BMP.     Hyperlipidemia LDL goal <130  CAD  -Continue ASA and statin.   -Recommend out pt follow up for MCI work up.     Active Ambulatory Problems     Diagnosis Date Noted     Secondary lymphedema 05/26/2023     Leg wound, left 09/14/2023     Lymphedema 01/18/2024     Confusion 04/22/2024     Unsteady gait 04/22/2024     Urinary tract infection 04/22/2024     Primary hypertension 04/22/2024     Impaired skin integrity 04/23/2024     Resolved Ambulatory Problems     Diagnosis Date Noted     No Resolved Ambulatory Problems     No Additional Past Medical History     Allergies   Allergen Reactions     Amoxicillin Nausea and Vomiting     Latex Rash     Lisinopril Cough       All Meds and Allergies reviewed in the record at the facility and is the most up-to-date.    Current Outpatient Medications   Medication Sig Dispense Refill     chlorthalidone (HYGROTON) 25 MG tablet Take 12.5 mg by mouth daily       aspirin 81 MG EC tablet [ASPIRIN 81 MG EC TABLET] Take 81 mg by mouth daily.       losartan (COZAAR) 100 MG tablet Take 100 mg by mouth daily       melatonin 1 MG TABS tablet Take 1 tablet (1 mg) by mouth nightly as needed for sleep       multivitamin (CENTRUM SILVER) tablet Take 1 tablet by mouth daily       NYAMYC 917543 UNIT/GM external powder Apply topically daily as needed       omeprazole (PRILOSEC) 20 MG capsule Take 20 mg by mouth daily       simvastatin (ZOCOR) 40 MG tablet Take 40 mg by mouth at bedtime       SYNTHROID 125 MCG tablet Take 125 mcg by mouth every morning       No current facility-administered medications for this visit.       REVIEW OF SYSTEMS:   10 point review of systems reviewed and pertinent positives in the HPI.     PHYSICAL EXAMINATION:  Physical Exam     Vital  "signs: BP (!) 159/74   Pulse 62   Temp 97.6  F (36.4  C)   Resp 20   Ht 1.6 m (5' 3\")   Wt 89 kg (196 lb 3.2 oz)   SpO2 96%   BMI 34.76 kg/m    General: Awake, Alert, oriented x3, sitting up in wheelchair, conversant  HEENT: Pink conjunctiva,moist oral mucosa  NECK: Supple  CVS:  S1  S2, without murmur or gallop.   LUNG: Clear to auscultation, No wheezes, rales or rhonci.  BACK: No kyphosis of the thoracic spine  ABDOMEN: Soft, with positive bowel sounds  EXTREMITIES: Good range of motion on both upper and lower extremities, lymph wraps to BLE, 1+ edema.   NEUROLOGIC: Intact, pulses palpable  PSYCHIATRIC: Cognitive impairment noted.       Labs:  All labs reviewed in the nursing home record and Epic   @  Lab Results   Component Value Date    WBC 8.7 04/24/2024     Lab Results   Component Value Date    RBC 5.28 04/24/2024     Lab Results   Component Value Date    HGB 14.2 04/24/2024     Lab Results   Component Value Date    HCT 45.3 04/24/2024     Lab Results   Component Value Date    MCV 86 04/24/2024     Lab Results   Component Value Date    MCH 26.9 04/24/2024     Lab Results   Component Value Date    MCHC 31.3 04/24/2024     Lab Results   Component Value Date    RDW 16.7 04/24/2024     Lab Results   Component Value Date     04/24/2024        @Last Comprehensive Metabolic Panel:  Sodium   Date Value Ref Range Status   05/02/2024 143 135 - 145 mmol/L Final     Comment:     Reference intervals for this test were updated on 09/26/2023 to more accurately reflect our healthy population. There may be differences in the flagging of prior results with similar values performed with this method. Interpretation of those prior results can be made in the context of the updated reference intervals.      Potassium   Date Value Ref Range Status   05/02/2024 4.3 3.4 - 5.3 mmol/L Final     Chloride   Date Value Ref Range Status   05/02/2024 105 98 - 107 mmol/L Final     Carbon Dioxide (CO2)   Date Value Ref Range " Status   05/02/2024 29 22 - 29 mmol/L Final     Anion Gap   Date Value Ref Range Status   05/02/2024 9 7 - 15 mmol/L Final     Glucose   Date Value Ref Range Status   05/02/2024 89 70 - 99 mg/dL Final     Urea Nitrogen   Date Value Ref Range Status   05/02/2024 13.0 8.0 - 23.0 mg/dL Final     Creatinine   Date Value Ref Range Status   05/02/2024 0.93 0.51 - 0.95 mg/dL Final     GFR Estimate   Date Value Ref Range Status   05/02/2024 63 >60 mL/min/1.73m2 Final     Calcium   Date Value Ref Range Status   05/02/2024 9.9 8.8 - 10.2 mg/dL Final       This note has been dictated using voice recognition software. Any grammatical or context distortions are unintentional and inherent to the software    Electronically signed by: Naima Chisholm CNP       Sincerely,        Naima Chisholm, NP

## 2024-05-08 LAB
ANION GAP SERPL CALCULATED.3IONS-SCNC: 10 MMOL/L (ref 7–15)
BUN SERPL-MCNC: 17.2 MG/DL (ref 8–23)
CALCIUM SERPL-MCNC: 9.9 MG/DL (ref 8.8–10.2)
CHLORIDE SERPL-SCNC: 106 MMOL/L (ref 98–107)
CREAT SERPL-MCNC: 1.01 MG/DL (ref 0.51–0.95)
DEPRECATED HCO3 PLAS-SCNC: 27 MMOL/L (ref 22–29)
EGFRCR SERPLBLD CKD-EPI 2021: 57 ML/MIN/1.73M2
ERYTHROCYTE [DISTWIDTH] IN BLOOD BY AUTOMATED COUNT: 15.8 % (ref 10–15)
GLUCOSE SERPL-MCNC: 90 MG/DL (ref 70–99)
HCT VFR BLD AUTO: 43.9 % (ref 35–47)
HGB BLD-MCNC: 13.9 G/DL (ref 11.7–15.7)
MCH RBC QN AUTO: 28 PG (ref 26.5–33)
MCHC RBC AUTO-ENTMCNC: 31.7 G/DL (ref 31.5–36.5)
MCV RBC AUTO: 89 FL (ref 78–100)
PLATELET # BLD AUTO: 241 10E3/UL (ref 150–450)
POTASSIUM SERPL-SCNC: 3.8 MMOL/L (ref 3.4–5.3)
RBC # BLD AUTO: 4.96 10E6/UL (ref 3.8–5.2)
SODIUM SERPL-SCNC: 143 MMOL/L (ref 135–145)
WBC # BLD AUTO: 7 10E3/UL (ref 4–11)

## 2024-05-08 PROCEDURE — 85027 COMPLETE CBC AUTOMATED: CPT | Mod: ORL | Performed by: NURSE PRACTITIONER

## 2024-05-08 PROCEDURE — 80048 BASIC METABOLIC PNL TOTAL CA: CPT | Mod: ORL | Performed by: NURSE PRACTITIONER

## 2024-05-08 PROCEDURE — P9604 ONE-WAY ALLOW PRORATED TRIP: HCPCS | Mod: ORL | Performed by: NURSE PRACTITIONER

## 2024-05-08 PROCEDURE — 36415 COLL VENOUS BLD VENIPUNCTURE: CPT | Mod: ORL | Performed by: NURSE PRACTITIONER

## 2024-05-08 NOTE — PROGRESS NOTES
[facility] GERIATRIC SERVICES    Code Status:  FULL CODE   Visit Type:   Chief Complaint   Patient presents with    TCU Follow Up     Facility:  [facility] ([facility]) [[id number]]         HPI: Irene Pena is a 78 year old female who I am seeing today for follow up on the TCU. Past medical history includes venous insufficiency with chronic ulcers to BLE, HTN, GERD, hypothyroidism and recurrent UTI. Pt sent to hospital from her wound clinic. Chronic ulcer to right leg followed by Wound Clinic since 2/24. Pt presented with increased confusion and weakness. Pt worked up for stroke.  MRI HEAD:Severe stenosis distal right V4 segment.  Multiple severe stenoses of the right P2 segment.  Moderate stenosis left P1 segment.MRI NECK:Severe stenosis origin of the right vertebral artery. No hemodynamically significant stenosis in the internal carotid arteries or left vertebral arteryIntracranial atherosclerosis was noted. Pt seen by neurology and statin and ASA recommended. Initially there was concern for UTI however UA unremarkable. Pt treated with ceftriaxone. Family reported longstanding concerns for insomnia. Recommended more workup for possible MCI.      Transitional Care Course: Today pt sitting up in wheelchair. Underlying cognitive impairment noted. BLE edema improved. She continues in compression. BP continues to be elevated in the 180s systolic. Amlodipine added by another provider however interaction with statin. Given her hx with edema will attempt to avoid. She denies any SOB or CP. No headache. She is voiding adequately and having regular bowel movements.        Assessment/Plan:     Lymphedema due to venous insufficiency  -chronic BLE edema.   -Wound to RLE now healed.   -followed by wound clinic since Feb 2024. Has follow up tomorrow.   -lymphedema therapist following.   -Continue topical treatment prior to wrapping 3/weekly.     Personal history of urinary tract infection  -Completed antibiotics.  "    Primary hypertension  -losartan 100 mg every day.   -Discontinue norvasc.   -start chlorthalidone 12.5 mg every day.   -monitor bp.   -Follow up BMP.     Hyperlipidemia LDL goal <130  CAD  -Continue ASA and statin.   -Recommend out pt follow up for MCI work up.     Active Ambulatory Problems     Diagnosis Date Noted    Secondary lymphedema 05/26/2023    Leg wound, left 09/14/2023    Lymphedema 01/18/2024    Confusion 04/22/2024    Unsteady gait 04/22/2024    Urinary tract infection 04/22/2024    Primary hypertension 04/22/2024    Impaired skin integrity 04/23/2024     Resolved Ambulatory Problems     Diagnosis Date Noted    No Resolved Ambulatory Problems     No Additional Past Medical History     Allergies   Allergen Reactions    Amoxicillin Nausea and Vomiting    Latex Rash    Lisinopril Cough       All Meds and Allergies reviewed in the record at the facility and is the most up-to-date.    Current Outpatient Medications   Medication Sig Dispense Refill    chlorthalidone (HYGROTON) 25 MG tablet Take 12.5 mg by mouth daily      aspirin 81 MG EC tablet [ASPIRIN 81 MG EC TABLET] Take 81 mg by mouth daily.      losartan (COZAAR) 100 MG tablet Take 100 mg by mouth daily      melatonin 1 MG TABS tablet Take 1 tablet (1 mg) by mouth nightly as needed for sleep      multivitamin (CENTRUM SILVER) tablet Take 1 tablet by mouth daily      NYAMYC 242020 UNIT/GM external powder Apply topically daily as needed      omeprazole (PRILOSEC) 20 MG capsule Take 20 mg by mouth daily      simvastatin (ZOCOR) 40 MG tablet Take 40 mg by mouth at bedtime      SYNTHROID 125 MCG tablet Take 125 mcg by mouth every morning       No current facility-administered medications for this visit.       REVIEW OF SYSTEMS:   10 point review of systems reviewed and pertinent positives in the HPI.     PHYSICAL EXAMINATION:  Physical Exam     Vital signs: BP (!) 159/74   Pulse 62   Temp 97.6  F (36.4  C)   Resp 20   Ht 1.6 m (5' 3\")   Wt 89 kg " (196 lb 3.2 oz)   SpO2 96%   BMI 34.76 kg/m    General: Awake, Alert, oriented x3, sitting up in wheelchair, conversant  HEENT: Pink conjunctiva,moist oral mucosa  NECK: Supple  CVS:  S1  S2, without murmur or gallop.   LUNG: Clear to auscultation, No wheezes, rales or rhonci.  BACK: No kyphosis of the thoracic spine  ABDOMEN: Soft, with positive bowel sounds  EXTREMITIES: Good range of motion on both upper and lower extremities, lymph wraps to BLE, 1+ edema.   NEUROLOGIC: Intact, pulses palpable  PSYCHIATRIC: Cognitive impairment noted.       Labs:  All labs reviewed in the nursing home record and Epic   @  Lab Results   Component Value Date    WBC 8.7 04/24/2024     Lab Results   Component Value Date    RBC 5.28 04/24/2024     Lab Results   Component Value Date    HGB 14.2 04/24/2024     Lab Results   Component Value Date    HCT 45.3 04/24/2024     Lab Results   Component Value Date    MCV 86 04/24/2024     Lab Results   Component Value Date    MCH 26.9 04/24/2024     Lab Results   Component Value Date    MCHC 31.3 04/24/2024     Lab Results   Component Value Date    RDW 16.7 04/24/2024     Lab Results   Component Value Date     04/24/2024        @Last Comprehensive Metabolic Panel:  Sodium   Date Value Ref Range Status   05/02/2024 143 135 - 145 mmol/L Final     Comment:     Reference intervals for this test were updated on 09/26/2023 to more accurately reflect our healthy population. There may be differences in the flagging of prior results with similar values performed with this method. Interpretation of those prior results can be made in the context of the updated reference intervals.      Potassium   Date Value Ref Range Status   05/02/2024 4.3 3.4 - 5.3 mmol/L Final     Chloride   Date Value Ref Range Status   05/02/2024 105 98 - 107 mmol/L Final     Carbon Dioxide (CO2)   Date Value Ref Range Status   05/02/2024 29 22 - 29 mmol/L Final     Anion Gap   Date Value Ref Range Status   05/02/2024 9 7 -  15 mmol/L Final     Glucose   Date Value Ref Range Status   05/02/2024 89 70 - 99 mg/dL Final     Urea Nitrogen   Date Value Ref Range Status   05/02/2024 13.0 8.0 - 23.0 mg/dL Final     Creatinine   Date Value Ref Range Status   05/02/2024 0.93 0.51 - 0.95 mg/dL Final     GFR Estimate   Date Value Ref Range Status   05/02/2024 63 >60 mL/min/1.73m2 Final     Calcium   Date Value Ref Range Status   05/02/2024 9.9 8.8 - 10.2 mg/dL Final       This note has been dictated using voice recognition software. Any grammatical or context distortions are unintentional and inherent to the software    Electronically signed by: Naima Chisholm, CNP

## 2024-05-13 ENCOUNTER — TRANSITIONAL CARE UNIT VISIT (OUTPATIENT)
Dept: GERIATRICS | Facility: CLINIC | Age: 79
End: 2024-05-13
Payer: COMMERCIAL

## 2024-05-13 VITALS
TEMPERATURE: 97.3 F | WEIGHT: 194.6 LBS | DIASTOLIC BLOOD PRESSURE: 87 MMHG | RESPIRATION RATE: 16 BRPM | BODY MASS INDEX: 34.48 KG/M2 | OXYGEN SATURATION: 93 % | HEIGHT: 63 IN | SYSTOLIC BLOOD PRESSURE: 182 MMHG | HEART RATE: 80 BPM

## 2024-05-13 DIAGNOSIS — Z87.440 PERSONAL HISTORY OF URINARY TRACT INFECTION: ICD-10-CM

## 2024-05-13 DIAGNOSIS — I89.0 SECONDARY LYMPHEDEMA: ICD-10-CM

## 2024-05-13 DIAGNOSIS — N39.0 URINARY TRACT INFECTION WITHOUT HEMATURIA, SITE UNSPECIFIED: ICD-10-CM

## 2024-05-13 DIAGNOSIS — I10 PRIMARY HYPERTENSION: Primary | ICD-10-CM

## 2024-05-13 DIAGNOSIS — E78.5 HYPERLIPIDEMIA LDL GOAL <130: ICD-10-CM

## 2024-05-13 DIAGNOSIS — R41.0 CONFUSION: ICD-10-CM

## 2024-05-13 PROCEDURE — 99316 NF DSCHRG MGMT 30 MIN+: CPT | Performed by: NURSE PRACTITIONER

## 2024-05-13 NOTE — PROGRESS NOTES
[facility] GERIATRIC SERVICES    Code Status:  FULL CODE   Visit Type:   Chief Complaint   Patient presents with    TCU discharge     Facility:  [facility] ([facility]) [[id number]]         HPI: Irene Pena is a 78 year old female who I am seeing today for discharge from the TCU. Past medical history includes venous insufficiency with chronic ulcers to BLE, HTN, GERD, hypothyroidism and recurrent UTI. Pt sent to hospital from her wound clinic. Chronic ulcer to right leg followed by Wound Clinic since 2/24. Pt presented with increased confusion and weakness. Pt worked up for stroke.  MRI HEAD:Severe stenosis distal right V4 segment.  Multiple severe stenoses of the right P2 segment.  Moderate stenosis left P1 segment.MRI NECK:Severe stenosis origin of the right vertebral artery. No hemodynamically significant stenosis in the internal carotid arteries or left vertebral arteryIntracranial atherosclerosis was noted. Pt seen by neurology and statin and ASA recommended. Initially there was concern for UTI however UA unremarkable. Pt treated with ceftriaxone. Family reported longstanding concerns for insomnia. Recommended more workup for possible MCI.      Transitional Care Course: Today pt sitting up in bedside chair. Underlying cognitive impairment. HTN. Pt continued with elevated bp at the TCU. Chlorthalidone added. BP improved. She also continues on losartan. Pt asymptomatic. She continues with chronic lymphedema. Compression wraps on. No dysuria.       Assessment/Plan:     Primary hypertension  -losartan 100 mg every day.   -chlorthalidone 12.5 mg every day.   -monitor bp.   -Follow up BMP unremarkable.     Lymphedema due to venous insufficiency  -chronic BLE edema.   -Wound to RLE now healed.   -followed by wound clinic since Feb 2024. Has follow up tomorrow.   -lymphedema therapist following.   -Continue topical treatment prior to wrapping 3/weekly.     Personal history of urinary tract infection  -Completed  antibiotics.     Hyperlipidemia LDL goal <130  CAD  -Continue ASA and statin.   -Recommend out pt follow up for MCI work up.     -ok to discharge with current meds and treatment.   -Home PT, OT, HHA and RN for medication management.   -Follow up with PCP in 1-2 weeks.   -Follow up with Neurology out pt.     Active Ambulatory Problems     Diagnosis Date Noted    Secondary lymphedema 05/26/2023    Leg wound, left 09/14/2023    Lymphedema 01/18/2024    Confusion 04/22/2024    Unsteady gait 04/22/2024    Urinary tract infection 04/22/2024    Primary hypertension 04/22/2024    Impaired skin integrity 04/23/2024     Resolved Ambulatory Problems     Diagnosis Date Noted    No Resolved Ambulatory Problems     No Additional Past Medical History     Allergies   Allergen Reactions    Amoxicillin Nausea and Vomiting    Latex Rash    Lisinopril Cough       All Meds and Allergies reviewed in the record at the facility and is the most up-to-date.    Current Outpatient Medications   Medication Sig Dispense Refill    aspirin 81 MG EC tablet [ASPIRIN 81 MG EC TABLET] Take 81 mg by mouth daily.      chlorthalidone (HYGROTON) 25 MG tablet Take 12.5 mg by mouth daily      losartan (COZAAR) 100 MG tablet Take 100 mg by mouth daily      melatonin 1 MG TABS tablet Take 1 tablet (1 mg) by mouth nightly as needed for sleep      multivitamin (CENTRUM SILVER) tablet Take 1 tablet by mouth daily      NYAMYC 154256 UNIT/GM external powder Apply topically daily as needed      omeprazole (PRILOSEC) 20 MG capsule Take 20 mg by mouth daily      simvastatin (ZOCOR) 40 MG tablet Take 40 mg by mouth at bedtime      SYNTHROID 125 MCG tablet Take 125 mcg by mouth every morning       No current facility-administered medications for this visit.       REVIEW OF SYSTEMS:   10 point review of systems reviewed and pertinent positives in the HPI.     PHYSICAL EXAMINATION:  Physical Exam     Vital signs: BP (!) 182/87   Pulse 80   Temp 97.3  F (36.3  C)    "Resp 16   Ht 1.6 m (5' 3\")   Wt 88.3 kg (194 lb 9.6 oz)   SpO2 93%   BMI 34.47 kg/m    General: Awake, Alert, oriented x3,  conversant  HEENT: Pink conjunctiva,moist oral mucosa  NECK: Supple  CVS:  S1  S2, without murmur or gallop.   LUNG: Clear to auscultation, No wheezes, rales or rhonci.  BACK: No kyphosis of the thoracic spine  ABDOMEN: Soft, with positive bowel sounds  EXTREMITIES: Good range of motion on both upper and lower extremities, lymph wraps to BLE, 1+ edema.   NEUROLOGIC: Intact, pulses palpable  PSYCHIATRIC: Cognitive impairment noted.       Labs:  All labs reviewed in the nursing home record and Epic   @  Lab Results   Component Value Date    WBC 8.7 04/24/2024     Lab Results   Component Value Date    RBC 5.28 04/24/2024     Lab Results   Component Value Date    HGB 14.2 04/24/2024     Lab Results   Component Value Date    HCT 45.3 04/24/2024     Lab Results   Component Value Date    MCV 86 04/24/2024     Lab Results   Component Value Date    MCH 26.9 04/24/2024     Lab Results   Component Value Date    MCHC 31.3 04/24/2024     Lab Results   Component Value Date    RDW 16.7 04/24/2024     Lab Results   Component Value Date     04/24/2024        @Last Comprehensive Metabolic Panel:  Sodium   Date Value Ref Range Status   05/08/2024 143 135 - 145 mmol/L Final     Comment:     Reference intervals for this test were updated on 09/26/2023 to more accurately reflect our healthy population. There may be differences in the flagging of prior results with similar values performed with this method. Interpretation of those prior results can be made in the context of the updated reference intervals.      Potassium   Date Value Ref Range Status   05/08/2024 3.8 3.4 - 5.3 mmol/L Final     Chloride   Date Value Ref Range Status   05/08/2024 106 98 - 107 mmol/L Final     Carbon Dioxide (CO2)   Date Value Ref Range Status   05/08/2024 27 22 - 29 mmol/L Final     Anion Gap   Date Value Ref Range Status "   05/08/2024 10 7 - 15 mmol/L Final     Glucose   Date Value Ref Range Status   05/08/2024 90 70 - 99 mg/dL Final     Urea Nitrogen   Date Value Ref Range Status   05/08/2024 17.2 8.0 - 23.0 mg/dL Final     Creatinine   Date Value Ref Range Status   05/08/2024 1.01 (H) 0.51 - 0.95 mg/dL Final     GFR Estimate   Date Value Ref Range Status   05/08/2024 57 (L) >60 mL/min/1.73m2 Final     Calcium   Date Value Ref Range Status   05/08/2024 9.9 8.8 - 10.2 mg/dL Final       DISCHARGE PLAN/FACE TO FACE:  I certify that this patient is under my care and that I, or a nurse practitioner or physician's assistant working with me, had a face-to-face encounter that meets the physician face-to-face encounter requirements with this patient.       I certify that, based on my findings, the following services are medically necessary home health services.    My clinical findings support the need for the above skilled services.    This patient is homebound because: Recent hospitalization with increased confusion and weakness thought to be due to UTI.    The patient is, or has been, under my care and I have initiated the establishment of the plan of care. This patient will be followed by a physician who will periodically review the plan of care.    > 35 minutes spent reviewing discharge medications, home care services and follow ups as well as collaborating with JOSE.     This note has been dictated using voice recognition software. Any grammatical or context distortions are unintentional and inherent to the software    Electronically signed by: Naima Chisholm CNP

## 2024-05-13 NOTE — LETTER
5/13/2024        RE: Irene Pena  [address] [id number]        [facility] GERIATRIC SERVICES    Code Status:  FULL CODE   Visit Type:   Chief Complaint   Patient presents with     TCU discharge     Facility:  [facility] ([facility]) [[id number]]         HPI: Irene Pena is a 78 year old female who I am seeing today for discharge from the TCU. Past medical history includes venous insufficiency with chronic ulcers to BLE, HTN, GERD, hypothyroidism and recurrent UTI. Pt sent to hospital from her wound clinic. Chronic ulcer to right leg followed by Wound Clinic since 2/24. Pt presented with increased confusion and weakness. Pt worked up for stroke.  MRI HEAD:Severe stenosis distal right V4 segment.  Multiple severe stenoses of the right P2 segment.  Moderate stenosis left P1 segment.MRI NECK:Severe stenosis origin of the right vertebral artery. No hemodynamically significant stenosis in the internal carotid arteries or left vertebral arteryIntracranial atherosclerosis was noted. Pt seen by neurology and statin and ASA recommended. Initially there was concern for UTI however UA unremarkable. Pt treated with ceftriaxone. Family reported longstanding concerns for insomnia. Recommended more workup for possible MCI.      Transitional Care Course: Today pt sitting up in bedside chair. Underlying cognitive impairment. HTN. Pt continued with elevated bp at the TCU. Chlorthalidone added. BP improved. She also continues on losartan. Pt asymptomatic. She continues with chronic lymphedema. Compression wraps on. No dysuria.       Assessment/Plan:     Primary hypertension  -losartan 100 mg every day.   -chlorthalidone 12.5 mg every day.   -monitor bp.   -Follow up BMP unremarkable.     Lymphedema due to venous insufficiency  -chronic BLE edema.   -Wound to RLE now healed.   -followed by wound clinic since Feb 2024. Has follow up tomorrow.   -lymphedema therapist following.   -Continue topical  treatment prior to wrapping 3/weekly.     Personal history of urinary tract infection  -Completed antibiotics.     Hyperlipidemia LDL goal <130  CAD  -Continue ASA and statin.   -Recommend out pt follow up for MCI work up.     -ok to discharge with current meds and treatment.   -Home PT, OT, HHA and RN for medication management.   -Follow up with PCP in 1-2 weeks.   -Follow up with Neurology out pt.     Active Ambulatory Problems     Diagnosis Date Noted     Secondary lymphedema 05/26/2023     Leg wound, left 09/14/2023     Lymphedema 01/18/2024     Confusion 04/22/2024     Unsteady gait 04/22/2024     Urinary tract infection 04/22/2024     Primary hypertension 04/22/2024     Impaired skin integrity 04/23/2024     Resolved Ambulatory Problems     Diagnosis Date Noted     No Resolved Ambulatory Problems     No Additional Past Medical History     Allergies   Allergen Reactions     Amoxicillin Nausea and Vomiting     Latex Rash     Lisinopril Cough       All Meds and Allergies reviewed in the record at the facility and is the most up-to-date.    Current Outpatient Medications   Medication Sig Dispense Refill     aspirin 81 MG EC tablet [ASPIRIN 81 MG EC TABLET] Take 81 mg by mouth daily.       chlorthalidone (HYGROTON) 25 MG tablet Take 12.5 mg by mouth daily       losartan (COZAAR) 100 MG tablet Take 100 mg by mouth daily       melatonin 1 MG TABS tablet Take 1 tablet (1 mg) by mouth nightly as needed for sleep       multivitamin (CENTRUM SILVER) tablet Take 1 tablet by mouth daily       NYAMYC 373635 UNIT/GM external powder Apply topically daily as needed       omeprazole (PRILOSEC) 20 MG capsule Take 20 mg by mouth daily       simvastatin (ZOCOR) 40 MG tablet Take 40 mg by mouth at bedtime       SYNTHROID 125 MCG tablet Take 125 mcg by mouth every morning       No current facility-administered medications for this visit.       REVIEW OF SYSTEMS:   10 point review of systems reviewed and pertinent positives in the  "HPI.     PHYSICAL EXAMINATION:  Physical Exam     Vital signs: BP (!) 182/87   Pulse 80   Temp 97.3  F (36.3  C)   Resp 16   Ht 1.6 m (5' 3\")   Wt 88.3 kg (194 lb 9.6 oz)   SpO2 93%   BMI 34.47 kg/m    General: Awake, Alert, oriented x3,  conversant  HEENT: Pink conjunctiva,moist oral mucosa  NECK: Supple  CVS:  S1  S2, without murmur or gallop.   LUNG: Clear to auscultation, No wheezes, rales or rhonci.  BACK: No kyphosis of the thoracic spine  ABDOMEN: Soft, with positive bowel sounds  EXTREMITIES: Good range of motion on both upper and lower extremities, lymph wraps to BLE, 1+ edema.   NEUROLOGIC: Intact, pulses palpable  PSYCHIATRIC: Cognitive impairment noted.       Labs:  All labs reviewed in the nursing home record and Epic   @  Lab Results   Component Value Date    WBC 8.7 04/24/2024     Lab Results   Component Value Date    RBC 5.28 04/24/2024     Lab Results   Component Value Date    HGB 14.2 04/24/2024     Lab Results   Component Value Date    HCT 45.3 04/24/2024     Lab Results   Component Value Date    MCV 86 04/24/2024     Lab Results   Component Value Date    MCH 26.9 04/24/2024     Lab Results   Component Value Date    MCHC 31.3 04/24/2024     Lab Results   Component Value Date    RDW 16.7 04/24/2024     Lab Results   Component Value Date     04/24/2024        @Last Comprehensive Metabolic Panel:  Sodium   Date Value Ref Range Status   05/08/2024 143 135 - 145 mmol/L Final     Comment:     Reference intervals for this test were updated on 09/26/2023 to more accurately reflect our healthy population. There may be differences in the flagging of prior results with similar values performed with this method. Interpretation of those prior results can be made in the context of the updated reference intervals.      Potassium   Date Value Ref Range Status   05/08/2024 3.8 3.4 - 5.3 mmol/L Final     Chloride   Date Value Ref Range Status   05/08/2024 106 98 - 107 mmol/L Final     Carbon Dioxide " (CO2)   Date Value Ref Range Status   05/08/2024 27 22 - 29 mmol/L Final     Anion Gap   Date Value Ref Range Status   05/08/2024 10 7 - 15 mmol/L Final     Glucose   Date Value Ref Range Status   05/08/2024 90 70 - 99 mg/dL Final     Urea Nitrogen   Date Value Ref Range Status   05/08/2024 17.2 8.0 - 23.0 mg/dL Final     Creatinine   Date Value Ref Range Status   05/08/2024 1.01 (H) 0.51 - 0.95 mg/dL Final     GFR Estimate   Date Value Ref Range Status   05/08/2024 57 (L) >60 mL/min/1.73m2 Final     Calcium   Date Value Ref Range Status   05/08/2024 9.9 8.8 - 10.2 mg/dL Final       DISCHARGE PLAN/FACE TO FACE:  I certify that this patient is under my care and that I, or a nurse practitioner or physician's assistant working with me, had a face-to-face encounter that meets the physician face-to-face encounter requirements with this patient.       I certify that, based on my findings, the following services are medically necessary home health services.    My clinical findings support the need for the above skilled services.    This patient is homebound because: Recent hospitalization with increased confusion and weakness thought to be due to UTI.    The patient is, or has been, under my care and I have initiated the establishment of the plan of care. This patient will be followed by a physician who will periodically review the plan of care.    > 35 minutes spent reviewing discharge medications, home care services and follow ups as well as collaborating with SW.     This note has been dictated using voice recognition software. Any grammatical or context distortions are unintentional and inherent to the software    Electronically signed by: Naima Chisholm CNP       Sincerely,        Naima Chisholm NP

## 2024-07-15 PROBLEM — I89.0 SECONDARY LYMPHEDEMA: Status: RESOLVED | Noted: 2023-05-26 | Resolved: 2024-07-15

## 2024-07-15 PROBLEM — I89.0 LYMPHEDEMA: Status: RESOLVED | Noted: 2024-01-18 | Resolved: 2024-07-15

## 2024-07-15 NOTE — PROGRESS NOTES
DISCHARGE  Reason for Discharge: Change in medical status.    Equipment Issued: N/A    Discharge Plan: Follow up with hospital and TCU plan    Referring Provider:  Dr. Ronn Spangler        See last treatment note for further information.   Olga Saenz, PT, CLT  7/15/2024

## 2024-09-29 ENCOUNTER — HEALTH MAINTENANCE LETTER (OUTPATIENT)
Age: 79
End: 2024-09-29